# Patient Record
Sex: FEMALE | NOT HISPANIC OR LATINO | Employment: FULL TIME | URBAN - METROPOLITAN AREA
[De-identification: names, ages, dates, MRNs, and addresses within clinical notes are randomized per-mention and may not be internally consistent; named-entity substitution may affect disease eponyms.]

---

## 2014-11-20 LAB
HPV ABSTRACT: NORMAL
PAP-ABSTRACT: ABNORMAL

## 2015-11-25 LAB
HPV ABSTRACT: NORMAL
PAP-ABSTRACT: NORMAL

## 2016-11-28 LAB
ALT SERPL-CCNC: 24 U/L (ref 0–78)
AST SERPL-CCNC: 18 U/L (ref 0–37)
CHOLEST SERPL-MCNC: 179 MG/DL (ref 0–200)
CREAT SERPL-MCNC: 0.7 MG/DL (ref 0.6–1.3)
GLUCOSE SERPL-MCNC: 81 MG/DL (ref 70–99)
HDLC SERPL-MCNC: 70 MG/DL (ref 40–96)
LDLC SERPL CALC-MCNC: 80.4 MG/DL (ref 0–130)
POTASSIUM SERPL-SCNC: 3.8 MMOL/L (ref 3.5–5.1)
TRIGL SERPL-MCNC: 143 MG/DL (ref 30–200)

## 2016-12-05 LAB
HPV ABSTRACT: NORMAL
PAP-ABSTRACT: NORMAL

## 2018-04-06 ENCOUNTER — TRANSFERRED RECORDS (OUTPATIENT)
Dept: HEALTH INFORMATION MANAGEMENT | Facility: CLINIC | Age: 38
End: 2018-04-06

## 2019-03-28 ENCOUNTER — TRANSFERRED RECORDS (OUTPATIENT)
Dept: HEALTH INFORMATION MANAGEMENT | Facility: CLINIC | Age: 39
End: 2019-03-28

## 2019-03-28 LAB
CHOLEST SERPL-MCNC: 158 MG/DL (ref 0–200)
HDLC SERPL-MCNC: 59 MG/DL (ref 40–96)
LDLC SERPL CALC-MCNC: 85.6 MG/DL (ref 0–130)
TRIGL SERPL-MCNC: 67 MG/DL (ref 30–200)

## 2019-04-12 ENCOUNTER — OFFICE VISIT (OUTPATIENT)
Dept: PEDIATRICS | Facility: CLINIC | Age: 39
End: 2019-04-12
Payer: COMMERCIAL

## 2019-04-12 VITALS
SYSTOLIC BLOOD PRESSURE: 140 MMHG | HEIGHT: 63 IN | WEIGHT: 256.6 LBS | TEMPERATURE: 99.1 F | HEART RATE: 84 BPM | BODY MASS INDEX: 45.46 KG/M2 | OXYGEN SATURATION: 97 % | DIASTOLIC BLOOD PRESSURE: 84 MMHG

## 2019-04-12 DIAGNOSIS — E66.01 MORBID OBESITY (H): ICD-10-CM

## 2019-04-12 DIAGNOSIS — F41.9 ANXIETY: ICD-10-CM

## 2019-04-12 DIAGNOSIS — Z11.4 SCREENING FOR HIV (HUMAN IMMUNODEFICIENCY VIRUS): ICD-10-CM

## 2019-04-12 DIAGNOSIS — Z13.1 SCREENING FOR DIABETES MELLITUS: ICD-10-CM

## 2019-04-12 DIAGNOSIS — Z30.41 ENCOUNTER FOR SURVEILLANCE OF CONTRACEPTIVE PILLS: ICD-10-CM

## 2019-04-12 DIAGNOSIS — Z00.00 ROUTINE GENERAL MEDICAL EXAMINATION AT A HEALTH CARE FACILITY: Primary | ICD-10-CM

## 2019-04-12 DIAGNOSIS — I10 BENIGN ESSENTIAL HYPERTENSION: ICD-10-CM

## 2019-04-12 DIAGNOSIS — Z13.220 SCREENING CHOLESTEROL LEVEL: ICD-10-CM

## 2019-04-12 LAB
ALBUMIN SERPL-MCNC: 2.9 G/DL (ref 3.4–5)
ALP SERPL-CCNC: 46 U/L (ref 40–150)
ALT SERPL W P-5'-P-CCNC: 20 U/L (ref 0–50)
ANION GAP SERPL CALCULATED.3IONS-SCNC: 6 MMOL/L (ref 3–14)
AST SERPL W P-5'-P-CCNC: 16 U/L (ref 0–45)
BILIRUB SERPL-MCNC: 0.5 MG/DL (ref 0.2–1.3)
BUN SERPL-MCNC: 12 MG/DL (ref 7–30)
CALCIUM SERPL-MCNC: 8.5 MG/DL (ref 8.5–10.1)
CHLORIDE SERPL-SCNC: 108 MMOL/L (ref 94–109)
CHOLEST SERPL-MCNC: 160 MG/DL
CO2 SERPL-SCNC: 25 MMOL/L (ref 20–32)
CREAT SERPL-MCNC: 0.62 MG/DL (ref 0.52–1.04)
GFR SERPL CREATININE-BSD FRML MDRD: >90 ML/MIN/{1.73_M2}
GLUCOSE SERPL-MCNC: 100 MG/DL (ref 70–99)
HDLC SERPL-MCNC: 69 MG/DL
HIV 1+2 AB+HIV1 P24 AG SERPL QL IA: NONREACTIVE
LDLC SERPL CALC-MCNC: 71 MG/DL
NONHDLC SERPL-MCNC: 98 MG/DL
POTASSIUM SERPL-SCNC: 4.1 MMOL/L (ref 3.4–5.3)
PROT SERPL-MCNC: 6.9 G/DL (ref 6.8–8.8)
SODIUM SERPL-SCNC: 139 MMOL/L (ref 133–144)
TRIGL SERPL-MCNC: 135 MG/DL

## 2019-04-12 PROCEDURE — 87389 HIV-1 AG W/HIV-1&-2 AB AG IA: CPT | Performed by: INTERNAL MEDICINE

## 2019-04-12 PROCEDURE — 99385 PREV VISIT NEW AGE 18-39: CPT | Performed by: INTERNAL MEDICINE

## 2019-04-12 PROCEDURE — 80061 LIPID PANEL: CPT | Performed by: INTERNAL MEDICINE

## 2019-04-12 PROCEDURE — 99213 OFFICE O/P EST LOW 20 MIN: CPT | Mod: 25 | Performed by: INTERNAL MEDICINE

## 2019-04-12 PROCEDURE — 80053 COMPREHEN METABOLIC PANEL: CPT | Performed by: INTERNAL MEDICINE

## 2019-04-12 PROCEDURE — 36415 COLL VENOUS BLD VENIPUNCTURE: CPT | Performed by: INTERNAL MEDICINE

## 2019-04-12 RX ORDER — NORGESTIMATE AND ETHINYL ESTRADIOL 0.25-0.035
KIT ORAL
Refills: 0 | COMMUNITY
Start: 2019-03-05 | End: 2019-04-12

## 2019-04-12 RX ORDER — LISINOPRIL 20 MG/1
20 TABLET ORAL DAILY
Qty: 90 TABLET | Refills: 3 | Status: SHIPPED | OUTPATIENT
Start: 2019-04-12 | End: 2020-04-08

## 2019-04-12 RX ORDER — ESCITALOPRAM OXALATE 10 MG/1
10 TABLET ORAL DAILY
Qty: 90 TABLET | Refills: 3 | Status: SHIPPED | OUTPATIENT
Start: 2019-04-12 | End: 2020-05-28

## 2019-04-12 RX ORDER — NORGESTIMATE AND ETHINYL ESTRADIOL 0.25-0.035
1 KIT ORAL DAILY
Qty: 84 TABLET | Refills: 3 | Status: SHIPPED | OUTPATIENT
Start: 2019-04-12 | End: 2020-03-11

## 2019-04-12 RX ORDER — LISINOPRIL 10 MG/1
10 TABLET ORAL DAILY
Refills: 3 | COMMUNITY
Start: 2018-12-15 | End: 2019-04-12

## 2019-04-12 RX ORDER — ESCITALOPRAM OXALATE 10 MG/1
10 TABLET ORAL DAILY
Refills: 3 | COMMUNITY
Start: 2018-12-15 | End: 2019-04-12

## 2019-04-12 ASSESSMENT — ENCOUNTER SYMPTOMS
ABDOMINAL PAIN: 0
SORE THROAT: 0
SHORTNESS OF BREATH: 0
MYALGIAS: 0
HEADACHES: 0
NERVOUS/ANXIOUS: 0
FREQUENCY: 0
COUGH: 1
CHILLS: 0
DYSURIA: 0
HEMATURIA: 0
FEVER: 0
DIZZINESS: 0
JOINT SWELLING: 0
PALPITATIONS: 0
HEARTBURN: 1
BREAST MASS: 0
WEAKNESS: 0
NAUSEA: 0
ARTHRALGIAS: 0
EYE PAIN: 0
DIARRHEA: 0
PARESTHESIAS: 0
HEMATOCHEZIA: 0
CONSTIPATION: 0

## 2019-04-12 ASSESSMENT — PATIENT HEALTH QUESTIONNAIRE - PHQ9
SUM OF ALL RESPONSES TO PHQ QUESTIONS 1-9: 4
5. POOR APPETITE OR OVEREATING: SEVERAL DAYS

## 2019-04-12 ASSESSMENT — ANXIETY QUESTIONNAIRES
1. FEELING NERVOUS, ANXIOUS, OR ON EDGE: SEVERAL DAYS
3. WORRYING TOO MUCH ABOUT DIFFERENT THINGS: SEVERAL DAYS
5. BEING SO RESTLESS THAT IT IS HARD TO SIT STILL: NOT AT ALL
2. NOT BEING ABLE TO STOP OR CONTROL WORRYING: NOT AT ALL
GAD7 TOTAL SCORE: 3
6. BECOMING EASILY ANNOYED OR IRRITABLE: NOT AT ALL
7. FEELING AFRAID AS IF SOMETHING AWFUL MIGHT HAPPEN: NOT AT ALL

## 2019-04-12 ASSESSMENT — MIFFLIN-ST. JEOR: SCORE: 1810.12

## 2019-04-12 NOTE — PATIENT INSTRUCTIONS
It was nice to see you in clinic.    Please stop at the lab on your way out of clinic. I will be in touch with your results.    Weight  - I'm glad you're thinking about it - I think now is the time!  - Move the healthy stuff to the front. Make it the easy choice. Try to delay the other choice.   - Referral to [facility] to talk about weight loss therapies.     Increase blood pressure dose to 20 mg daily. In a week or two stop by the pharmacy for a blood pressure check (no appointment needed).     Preventive Health Recommendations  Female Ages 26 - 39  Yearly exam:   See your health care provider every year in order to    Review health changes.     Discuss preventive care.      Review your medicines if you your doctor has prescribed any.    Until age 30: Get a Pap test every three years (more often if you have had an abnormal result).    After age 30: Talk to your doctor about whether you should have a Pap test every 3 years or have a Pap test with HPV screening every 5 years.   You do not need a Pap test if your uterus was removed (hysterectomy) and you have not had cancer.  You should be tested each year for STDs (sexually transmitted diseases), if you're at risk.   Talk to your provider about how often to have your cholesterol checked.  If you are at risk for diabetes, you should have a diabetes test (fasting glucose).  Shots: Get a flu shot each year. Get a tetanus shot every 10 years.   Nutrition:     Eat at least 5 servings of fruits and vegetables each day.    Eat whole-grain bread, whole-wheat pasta and brown rice instead of white grains and rice.    Get adequate Calcium and Vitamin D.     Lifestyle    Exercise at least 150 minutes a week (30 minutes a day, 5 days of the week). This will help you control your weight and prevent disease.    Limit alcohol to one drink per day.    No smoking.     Wear sunscreen to prevent skin cancer.    See your dentist every six months for an exam and cleaning.

## 2019-04-12 NOTE — PROGRESS NOTES
rik   SUBJECTIVE:   CC: Dora Barrera is an 38 year old woman who presents for preventive health visit.     Healthy Habits:     Getting at least 3 servings of Calcium per day:  Yes    Bi-annual eye exam:  Yes    Dental care twice a year:  Yes    Sleep apnea or symptoms of sleep apnea:  None    Diet:  Regular (no restrictions)    Frequency of exercise:  None    Taking medications regularly:  Yes    Medication side effects:  None    PHQ-2 Total Score: 0    Additional concerns today:  No    Hypertension Follow-up    Outpatient blood pressures are not being checked.    Low Salt Diet: no added salt  - usually 140 systolic     # Weight  Struggling to lose weight.   Has put 40 lbs on since July.   Stress eater.   Last 4 months has been doing 2 jobs at work.   Has thought about going back to weight watchers.    Anxiety Follow-Up    Status since last visit: No change    Other associated symptoms:heart racing    Complicating factors:   Significant life event: Yes-  Work issues   Current substance abuse: None  Depression symptoms: No  JAQUELINE-7 SCORE 4/12/2019   Total Score 3       JAQUELINE-7    Today's PHQ-2 Score:   PHQ-2 ( 1999 Pf[employer]) 4/12/2019   Q1: Little interest or pleasure in doing things 0   Q2: Feeling down, depressed or hopeless 0   PHQ-2 Score 0   Q1: Little interest or pleasure in doing things Not at all   Q2: Feeling down, depressed or hopeless Not at all   PHQ-2 Score 0       Abuse: Current or Past(Physical, Sexual or Emotional)- No  Do you feel safe in your environment? Yes    Social History     Tobacco Use     Smoking status: Never Smoker     Smokeless tobacco: Never Used   Substance Use Topics     Alcohol use: Yes     Comment: occ     If you drink alcohol do you typically have >3 drinks per day or >7 drinks per week? No    Alcohol Use 4/12/2019   Prescreen: >3 drinks/day or >7 drinks/week? No   Prescreen: >3 drinks/day or >7 drinks/week? -   No flowsheet data found.    Reviewed orders with patient.  Reviewed health  maintenance and updated orders accordingly - Yes  Patient Active Problem List   Diagnosis     Anxiety     BMI > 40.0 (H)     Benign essential hypertension     Past Surgical History:   Procedure Laterality Date     CHOLECYSTECTOMY       HC ASPIRATION OF OVARIAN CYST Left      OOPHORECTOMY Right     Ovarian cyst, torsion       Social History     Tobacco Use     Smoking status: Never Smoker     Smokeless tobacco: Never Used   Substance Use Topics     Alcohol use: Yes     Comment: occ     Family History   Problem Relation Age of Onset     Hypertension Mother      Heart Disease Father      Diabetes Father      Hypertension Father      Colon Cancer No family hx of      Breast Cancer No family hx of          Current Outpatient Medications   Medication Sig Dispense Refill     escitalopram (LEXAPRO) 10 MG tablet Take 1 tablet (10 mg) by mouth daily 90 tablet 3     lisinopril (PRINIVIL/ZESTRIL) 20 MG tablet Take 1 tablet (20 mg) by mouth daily 90 tablet 3     SPRINTEC 28 0.25-35 MG-MCG tablet Take 1 tablet by mouth daily 84 tablet 3     No Known Allergies    Mammogram not appropriate for this patient based on age.    Pertinent mammograms are reviewed under the imaging tab.  History of abnormal Pap smear: Yes several years ago with multiple normals since. Will get records from [facility].      Reviewed and updated as needed this visit by clinical staff      Reviewed and updated as needed this visit by Provider  Med Hx  Surg Hx  Fam Hx        Past Medical History:   Diagnosis Date     Anxiety      Hypertension       Past Surgical History:   Procedure Laterality Date     CHOLECYSTECTOMY       HC ASPIRATION OF OVARIAN CYST Left      OOPHORECTOMY Right     Ovarian cyst, torsion       Review of Systems   Constitutional: Negative for chills and fever.   HENT: Negative for congestion, ear pain, hearing loss and sore throat.    Eyes: Negative for pain and visual disturbance.   Respiratory: Positive for cough. Negative for shortness of  "breath.    Cardiovascular: Negative for chest pain, palpitations and peripheral edema.   Gastrointestinal: Positive for heartburn. Negative for abdominal pain, constipation, diarrhea, hematochezia and nausea.   Breasts:  Negative for tenderness, breast mass and discharge.   Genitourinary: Negative for dysuria, frequency, genital sores, hematuria, pelvic pain, urgency, vaginal bleeding and vaginal discharge.   Musculoskeletal: Negative for arthralgias, joint swelling and myalgias.   Skin: Negative for rash.   Neurological: Negative for dizziness, weakness, headaches and paresthesias.   Psychiatric/Behavioral: Negative for mood changes. The patient is not nervous/anxious.      OBJECTIVE:   /84 (BP Location: Right arm, Patient Position: Sitting, Cuff Size: Adult Large)   Pulse 84   Temp 99.1  F (37.3  C) (Tympanic)   Ht 1.588 m (5' 2.5\")   Wt 116.4 kg (256 lb 9.6 oz)   SpO2 97%   BMI 46.18 kg/m    Physical Exam  GENERAL: healthy, alert and no distress  EYES: Eyes grossly normal to inspection, PERRL and conjunctivae and sclerae normal  HENT: ear canals and TM's normal, nose and mouth without ulcers or lesions  NECK: no adenopathy, no asymmetry, masses, or scars and thyroid normal to palpation  RESP: lungs clear to auscultation - no rales, rhonchi or wheezes  CV: regular rate and rhythm, normal S1 S2, no S3 or S4, no murmur, click or rub, no peripheral edema and peripheral pulses strong  ABDOMEN: soft, nontender, no hepatosplenomegaly, no masses and bowel sounds normal  MS: no gross musculoskeletal defects noted, no edema  SKIN: no suspicious lesions or rashes  NEURO: Normal strength and tone, mentation intact and speech normal  PSYCH: mentation appears normal, affect normal/bright    Diagnostic Test Results:  See results note.      ASSESSMENT/PLAN:   1. Routine general medical examination at a health care facility  2. BMI > 40.0 (H)  Discussed diet/exercise goals. See PI.   Interested in meeting with MTM to " "talk about obesity treatment.  - MED THERAPY MANAGE REFERRAL  - Lipid panel reflex to direct LDL Fasting  - Comprehensive metabolic panel    3. Benign essential hypertension  Elevated BP today - will increase lisinopril and recheck at pharmacy.   - lisinopril (PRINIVIL/ZESTRIL) 20 MG tablet; Take 1 tablet (20 mg) by mouth daily  Dispense: 90 tablet; Refill: 3  - Comprehensive metabolic panel    4. Anxiety  Well controlled.   - escitalopram (LEXAPRO) 10 MG tablet; Take 1 tablet (10 mg) by mouth daily  Dispense: 90 tablet; Refill: 3    5. Encounter for surveillance of contraceptive pills  - SPRINTEC 28 0.25-35 MG-MCG tablet; Take 1 tablet by mouth daily  Dispense: 84 tablet; Refill: 3    6. Screening cholesterol level  - Lipid panel reflex to direct LDL Fasting    7. Screening for diabetes mellitus    8. Screening for HIV (human immunodeficiency virus)  - HIV Screening    COUNSELING:  Reviewed preventive health counseling, as reflected in patient instructions       Regular exercise       Healthy diet/nutrition       Contraception       Safe sex practices/STD prevention       HIV screeninx in teen years, 1x in adult years, and at intervals if high risk    BP Readings from Last 1 Encounters:   19 140/84     Estimated body mass index is 46.18 kg/m  as calculated from the following:    Height as of this encounter: 1.588 m (5' 2.5\").    Weight as of this encounter: 116.4 kg (256 lb 9.6 oz).    Weight management plan: Patient referred to endocrine and/or weight management specialty Discussed healthy diet and exercise guidelines     reports that she has never smoked. She has never used smokeless tobacco.    Counseling Resources:  ATP IV Guidelines  Pooled Cohorts Equation Calculator  Breast Cancer Risk Calculator  FRAX Risk Assessment  ICSI Preventive Guidelines  Dietary Guidelines for Americans,   [employer]'s MyPlate  ASA Prophylaxis  Lung CA Screening    Jeff Contreras MD  [facility] [place]  "

## 2019-04-12 NOTE — LETTER
"               [facility]  [address]  [place] [address]                  [phone]   April 16, 2019    Dora Barrrea  [address]  [place] [address]      Ms. Dora Barrera,     Your standard one-time screening for HIV was negative.     Your electrolytes, kidney function, and liver enzymes were normal.     Your cholesterol looked great!     Your fasting glucose was just minimally elevated - this means you have impaired glucose tolerance, also known as \"pre-diabetes.\" This means you are at a higher risk of developing diabetes. Eating healthily, exercising and maintaining a healthy weight is the best way to help prevent diabetes from developing. We will check your fasting blood sugar/hemoglobin a1c every year to monitor this. I think your plan to meet with our [facility] Pharmacists to talk about weight loss is a great one.     Please let me know if you have any questions,   E. Belen Contreras MD   Internal Medicine-Pediatrics         Results for orders placed or performed in visit on 04/12/19   HIV Screening   Result Value Ref Range    HIV Antigen Antibody Combo Nonreactive NR^Nonreactive       Lipid panel reflex to direct LDL Fasting   Result Value Ref Range    Cholesterol 160 <200 mg/dL    Triglycerides 135 <150 mg/dL    HDL Cholesterol 69 >49 mg/dL    LDL Cholesterol Calculated 71 <100 mg/dL    Non HDL Cholesterol 98 <130 mg/dL   Comprehensive metabolic panel   Result Value Ref Range    Sodium 139 133 - 144 mmol/L    Potassium 4.1 3.4 - 5.3 mmol/L    Chloride 108 94 - 109 mmol/L    Carbon Dioxide 25 20 - 32 mmol/L    Anion Gap 6 3 - 14 mmol/L    Glucose 100 (H) 70 - 99 mg/dL    Urea Nitrogen 12 7 - 30 mg/dL    Creatinine 0.62 0.52 - 1.04 mg/dL    GFR Estimate >90 >60 mL/min/[1.73_m2]    GFR Estimate If Black >90 >60 mL/min/[1.73_m2]    Calcium 8.5 8.5 - 10.1 mg/dL    Bilirubin Total 0.5 0.2 - 1.3 mg/dL    Albumin 2.9 (L) 3.4 - 5.0 g/dL    Protein Total 6.9 6.8 - 8.8 " g/dL    Alkaline Phosphatase 46 40 - 150 U/L    ALT 20 0 - 50 U/L    AST 16 0 - 45 U/L

## 2019-04-13 ASSESSMENT — ANXIETY QUESTIONNAIRES: GAD7 TOTAL SCORE: 3

## 2019-04-16 PROBLEM — R73.01 ELEVATED FASTING GLUCOSE: Status: ACTIVE | Noted: 2019-04-16

## 2019-05-01 ENCOUNTER — ALLIED HEALTH/NURSE VISIT (OUTPATIENT)
Dept: PHARMACY | Facility: CLINIC | Age: 39
End: 2019-05-01
Payer: COMMERCIAL

## 2019-05-01 DIAGNOSIS — Z30.9 ENCOUNTER FOR CONTRACEPTIVE MANAGEMENT, UNSPECIFIED TYPE: ICD-10-CM

## 2019-05-01 DIAGNOSIS — E66.01 MORBID OBESITY (H): Primary | ICD-10-CM

## 2019-05-01 DIAGNOSIS — F41.9 ANXIETY: ICD-10-CM

## 2019-05-01 DIAGNOSIS — I10 BENIGN ESSENTIAL HYPERTENSION: ICD-10-CM

## 2019-05-01 PROCEDURE — 99607 MTMS BY PHARM ADDL 15 MIN: CPT | Performed by: PHARMACIST

## 2019-05-01 PROCEDURE — 99605 MTMS BY PHARM NP 15 MIN: CPT | Performed by: PHARMACIST

## 2019-05-01 NOTE — PROGRESS NOTES
SUBJECTIVE/OBJECTIVE:                           Dora Barrera is a 38 year old female coming in for an initial visit for Medication Therapy Management.  She was referred to me from Dr. Contreras at [facility].    Chief Complaint: Would like to discuss options for weight loss medications    Allergies/ADRs: Reviewed in Epic  Tobacco: No tobacco use  Alcohol: 7-9 beverages / week, has been recently cutting out except for special occasions.   Caffeine: 1 cups/day of coffee, 1 diet soda/day   PMH: Reviewed in Epic    Medication Adherence/Access:  no issues reported    Obesity: Current medication(s) include: none. She is interested in discussing potential options for medications for weight loss. She is nervious about injectable therapies and the coverage for them. She does not have history of kidney stones. She is on birth control, not planning to become pregnant.   Weight History: She feels that she has been overweight all of her life, 180-200 lb for majority of adult life. Had 30-40 lb weight loss, greatest success was losing weight to 155 lb through food tracking and joining gym, used myfitness pal caloric restriction, she doesn't remember what it does. Has used Weight Watchers before, and lost weight but then regained. When she got in relationship she stopped doing these things. She is now the heaviest she has ever been. She feels her sleep is good, getting 8 hours of sleep but still feels tired, she has been told she snores but not excessive.   Diet/Eating Habits: Patient reports she does eat larger portions and still not get full, she craves foods and snacks frequently. Stress eats. Eating salty/sweets after dinner every day: popcorn, a few chocolate chips. Since her last appointment with PCP, she has been changing her eating habits. She was going out to eat more or eating at work cafeteria, not meal prepping. She does not skip meals. Breakfast: fast food or oatmeal; Lunch: variable, Dinner processed  "convenient frozen foods, eating out. She buys healthy snacks but doesn't eat. She finds that she is drinking more alcohol due to stress, but since meeting with Dr. Contreras she has been trying to cut down. She feels like she eats more due to stress.   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 At home, activity has been minimal. She does own a dog, she plans to increase walking for her dog.     Was referred by Dr. Contreras at [facility].   Initial weight: 256 lb 9.6 oz BMI 46.18 kg/m^2 on 4/12/19    Creatinine   Date Value Ref Range Status   04/12/2019 0.62 0.52 - 1.04 mg/dL Final     GFR Estimate   Date Value Ref Range Status   04/12/2019 >90 >60 mL/min/[1.73_m2] Final     Comment:     Non  GFR Calc  Starting 12/18/2018, serum creatinine based estimated GFR (eGFR) will be   calculated using the Chronic Kidney Disease Epidemiology Collaboration   (CKD-EPI) equation.       Potassium   Date Value Ref Range Status   04/12/2019 4.1 3.4 - 5.3 mmol/L Final     CBC from previous primary care clinic was WNL.     Hypertension: Current medications include lisinopril 20 mg once daily, dose was increased on 4/12/19 when she went to doctors office. Patient does not self-monitor BP.  Patient reports no current medication side effects. No dizziness or lightheadedness.     Anxiety: Taking escitalopram 10 mg once daily. She has been on this medication for 3 years. She feels that this medication is effective for anxiety, she thought when she started the medication \"took the anxiety down a notch\". She thought escitalopram had caused her to gain weight, so self decreased to 5 mg daily and noticed increased anxiety so went back up to 10 mg daily.     PHQ-9 SCORE 4/12/2019   PHQ-9 Total Score 4     JAQUELINE-7 SCORE 4/12/2019   Total Score 3     Birth Control : Taking Sprintec 1 tablet once daily. No concerns. She is not planning on becoming " pregnant.     Today's Vitals: There were no vitals taken for this visit. Telemedicine visit.     ASSESSMENT:                             Current medications were reviewed today.     Medication Adherence: good, no issues identified    Obesity: Needs improvement. Patient does have BMI >/= 30 kg/m^2 and does qualify for weight loss medications. She has solely tried diet and exercise and did have success in the past, but due to this difficulty now, could consider starting weight loss medication. Phentermine not appropriate due to last BP. Contrave may not be the most appropriate as she is dealing with a good amount of stress at work and may exacerbate this. Saxenda may be an option in future, but unsure of cost of medication and patient is leery of once daily injectable. Naltrexone could be effective solely, but may not be most appropriate option. Due to issues of consistent thinking of food, stress eating, hunger/cravings - patient may benefit from trial of topiramate taking daily at dinnertime. She is aware that topiramate is not FDA approved for weight loss, but chooses to continue at this time. Aware of risk of birth defects and to continue birth control and/or use contraceptives when sexually active. Discussed food logging and dietary adjustments that may benefit. Also discussed [facility] 24 Week Program, and patient is interested.     Hypertension: last BP not at goal of <140/90 mmHg, but lisinopril was adjusted since that time. Due to have repeat BP. May benefit from repeat BMP in future.     Anxiety: Stable.    Birth Control : Stable.     PLAN:                            1. Will send 24 week program. Will send via email, authorized by patient.     2. Reached out to Dr. Contreras regarding starting topiramate, awaiting to hear her thoughts. She prefers filling prescription at [facility].    Could consider start Topiramate: 25 mg daily at dinner for 1 week, 50 mg daily at dinner for  1 week, then 75 mg daily at dinner thereafter.  Update 5/6/19: Topiramate authorized by Dr. Contreras, Rx sent to pharmacy. Patient called, left VM and CB#.       3. BP will be repeated at follow up. May benefit from repeat BMP due to adjustment of ACE-I.     I spent 45 minutes with this patient today. A copy of the visit note was provided to the patient's referring provider.    Will follow up in 1 month.    The patient was sent via Aniika a summary of these recommendations as an after visit summary.     Lauren Turner Bloch, PharmD  Medication Therapy Management Pharmacist   [facility]   Phone: [phone]

## 2019-05-01 NOTE — Clinical Note
Tony Contreras, sending this to you as FYI. I sent staff message inquiring about starting topiramate. Thank you! Karin Bermudez MTM Pharmacist

## 2019-05-06 RX ORDER — TOPIRAMATE 25 MG/1
TABLET, FILM COATED ORAL
Qty: 90 TABLET | Refills: 3 | Status: SHIPPED | OUTPATIENT
Start: 2019-05-06 | End: 2020-02-12

## 2019-06-26 ENCOUNTER — TELEPHONE (OUTPATIENT)
Dept: PHARMACY | Facility: CLINIC | Age: 39
End: 2019-06-26

## 2019-06-26 NOTE — TELEPHONE ENCOUNTER
Patient has not made follow up appointment for MTM follow up on weight loss medication start. Called patient to schedule follow up with MTM. Patient did not answer. Left VM and CB#.     Lauren Turner Bloch, PharmD  Medication Therapy Management Pharmacist   [facility]   Phone: [phone]

## 2019-11-29 ENCOUNTER — TELEPHONE (OUTPATIENT)
Dept: PHARMACY | Facility: CLINIC | Age: 39
End: 2019-11-29

## 2019-11-29 NOTE — TELEPHONE ENCOUNTER
Called patient today to schedule a follow up with MTM Pharmacist at [facility] as patient lost to follow up. Patient answered, asked for phone # to call back next week. She said her father just recently got out of hospital and could not talk at this time. Gave patient my phone number to call back and phone number to reschedule.     Lauren Bloch, PharmD  Medication Therapy Management Pharmacist   [facility]   Phone: [phone]

## 2019-12-04 ENCOUNTER — TELEPHONE (OUTPATIENT)
Dept: PHARMACY | Facility: CLINIC | Age: 39
End: 2019-12-04

## 2019-12-04 NOTE — TELEPHONE ENCOUNTER
Called patient to check in as last [facility] visit was 5/2019. Patient requested she call back at later date. Called back and patient requesting follow up for assistance with weight loss.     She describes she is feeling really stuck. She feels that things are not clicking and having a hard time feeling motivated. She has life stressors in place and hard to focus on her weight. However, she wants to make her weight a priority.     She states that she did start topiramate and could have been helping with appetite/cravings, but didn't take it consistently. She doesn't know why she didn't but feels like it was stressors with life.     She is interested in 24 week program as previously discussed in May. Patient requested sending information via email so she can check into coverage. Sent information via secure e-mail (below).     Lauren Bloch, Fabio  Medication Therapy Management Pharmacist   [facility]   Phone: [phone]

## 2020-02-11 ENCOUNTER — TRANSFERRED RECORDS (OUTPATIENT)
Dept: HEALTH INFORMATION MANAGEMENT | Facility: CLINIC | Age: 40
End: 2020-02-11

## 2020-02-11 DIAGNOSIS — E66.01 MORBID OBESITY (H): ICD-10-CM

## 2020-02-11 NOTE — TELEPHONE ENCOUNTER
"Requested Prescriptions   Pending Prescriptions Disp Refills     topiramate (TOPAMAX) 25 MG tablet 90 tablet 3     Sig: Take 1 tablet daily with dinner for 1 week, then 2 tablets daily with dinner for 1 week, then 3 tablets daily with dinner.       Anti-Seizure Meds Protocol  Failed - 2/11/2020 12:46 PM        Failed - Review Authorizing provider's last note.      Refer to last progress notes: confirm request is for original authorizing provider (cannot be through other providers).          Failed - Normal CBC on file in past 26 months     No lab results found.              Failed - Normal platelet count on file in past 26 months     No lab results found.            Passed - Recent (12 mo) or future (30 days) visit within the authorizing provider's specialty     Patient has had an office visit with the authorizing provider or a provider within the authorizing providers department within the previous 12 mos or has a future within next 30 days. See \"Patient Info\" tab in inbasket, or \"Choose Columns\" in Meds & Orders section of the refill encounter.              Passed - Normal ALT or AST on file in past 26 months     Recent Labs   Lab Test 04/12/19  0752   ALT 20     Recent Labs   Lab Test 04/12/19  0752   AST 16             Passed - Medication is active on med list        Passed - No active pregnancy on record        Passed - No positive pregnancy test in last 12 months      Routing refill request to provider for review/approval because:  Labs not current:  CBC, platelet  Labs cued.    Abbi Washington, RN   [facility]     "

## 2020-02-12 RX ORDER — TOPIRAMATE 25 MG/1
75 TABLET, FILM COATED ORAL DAILY
Qty: 90 TABLET | Refills: 3 | Status: SHIPPED | OUTPATIENT
Start: 2020-02-12 | End: 2020-05-28

## 2020-02-12 NOTE — TELEPHONE ENCOUNTER
Refill sent.  Patient due for physical with Dr Contreras in April.  Please help her schedule (might want to wait a day or 2 - she was just in the ER with influenza yesterday).      Demi Borrego MD  Internal Medicine - Pediatrics

## 2020-03-10 ENCOUNTER — TELEPHONE (OUTPATIENT)
Dept: PEDIATRICS | Facility: CLINIC | Age: 40
End: 2020-03-10

## 2020-03-10 DIAGNOSIS — Z30.41 ENCOUNTER FOR SURVEILLANCE OF CONTRACEPTIVE PILLS: ICD-10-CM

## 2020-03-11 RX ORDER — NORGESTIMATE AND ETHINYL ESTRADIOL 0.25-0.035
1 KIT ORAL DAILY
Qty: 84 TABLET | Refills: 0 | Status: SHIPPED | OUTPATIENT
Start: 2020-03-11 | End: 2020-05-19

## 2020-03-11 NOTE — TELEPHONE ENCOUNTER
Prescription approved per [facility] Refill Protocol.    TC/MA, please outreach to the patient to set up an annual office visit.

## 2020-03-11 NOTE — TELEPHONE ENCOUNTER
Left message due for annual visit.     Rx sent to pharmacy.     Rika Joseph MA// March 11, 2020 4:34 PM

## 2020-04-06 DIAGNOSIS — I10 BENIGN ESSENTIAL HYPERTENSION: ICD-10-CM

## 2020-04-07 NOTE — TELEPHONE ENCOUNTER
lisinopril (PRINIVIL/ZESTRIL) 20 MG tablet  Routing refill request to provider for review/approval because:  BP out of range  BP Readings from Last 3 Encounters:   04/12/19 140/84     Melissa PACK RN, BSN

## 2020-04-08 RX ORDER — LISINOPRIL 20 MG/1
20 TABLET ORAL DAILY
Qty: 90 TABLET | Refills: 0 | Status: SHIPPED | OUTPATIENT
Start: 2020-04-08 | End: 2020-05-28

## 2020-04-08 NOTE — TELEPHONE ENCOUNTER
Due for labs and BP check.     Will refill until next OV.     RACHEL Contreras MD  Internal Medicine-Pediatrics

## 2020-05-13 DIAGNOSIS — Z30.41 ENCOUNTER FOR SURVEILLANCE OF CONTRACEPTIVE PILLS: ICD-10-CM

## 2020-05-13 RX ORDER — NORGESTIMATE AND ETHINYL ESTRADIOL 0.25-0.035
1 KIT ORAL DAILY
Qty: 84 TABLET | Refills: 0 | OUTPATIENT
Start: 2020-05-13

## 2020-05-19 RX ORDER — NORGESTIMATE AND ETHINYL ESTRADIOL 0.25-0.035
1 KIT ORAL DAILY
Qty: 84 TABLET | Refills: 0 | Status: SHIPPED | OUTPATIENT
Start: 2020-05-19 | End: 2020-05-28

## 2020-05-19 NOTE — TELEPHONE ENCOUNTER
Routing refill request to provider for review/approval because:  Patient would like to take medication continually.     Bibi Matthews RN on 5/19/2020 at 9:47 AM

## 2020-05-19 NOTE — TELEPHONE ENCOUNTER
Patient takes continuously (doesn't take non-medicated pills). Needs refill ASAP.  -Galilea Dent

## 2020-05-28 ENCOUNTER — VIRTUAL VISIT (OUTPATIENT)
Dept: PEDIATRICS | Facility: CLINIC | Age: 40
End: 2020-05-28
Payer: COMMERCIAL

## 2020-05-28 DIAGNOSIS — Z13.1 SCREENING FOR DIABETES MELLITUS: ICD-10-CM

## 2020-05-28 DIAGNOSIS — F41.9 ANXIETY: ICD-10-CM

## 2020-05-28 DIAGNOSIS — E66.01 MORBID OBESITY (H): Primary | ICD-10-CM

## 2020-05-28 DIAGNOSIS — I10 BENIGN ESSENTIAL HYPERTENSION: ICD-10-CM

## 2020-05-28 DIAGNOSIS — Z30.41 ENCOUNTER FOR SURVEILLANCE OF CONTRACEPTIVE PILLS: ICD-10-CM

## 2020-05-28 PROCEDURE — 99214 OFFICE O/P EST MOD 30 MIN: CPT | Mod: TEL | Performed by: INTERNAL MEDICINE

## 2020-05-28 PROCEDURE — 96127 BRIEF EMOTIONAL/BEHAV ASSMT: CPT | Performed by: INTERNAL MEDICINE

## 2020-05-28 RX ORDER — TOPIRAMATE 25 MG/1
TABLET, FILM COATED ORAL
Qty: 270 TABLET | Refills: 1 | Status: SHIPPED | OUTPATIENT
Start: 2020-05-28 | End: 2020-09-10

## 2020-05-28 RX ORDER — ESCITALOPRAM OXALATE 10 MG/1
10 TABLET ORAL DAILY
Qty: 90 TABLET | Refills: 1 | Status: SHIPPED | OUTPATIENT
Start: 2020-05-28 | End: 2020-08-06

## 2020-05-28 RX ORDER — NORGESTIMATE AND ETHINYL ESTRADIOL 0.25-0.035
1 KIT ORAL DAILY
Qty: 84 TABLET | Refills: 1 | Status: SHIPPED | OUTPATIENT
Start: 2020-05-28 | End: 2020-09-25

## 2020-05-28 RX ORDER — LISINOPRIL 20 MG/1
20 TABLET ORAL DAILY
Qty: 90 TABLET | Refills: 0 | Status: SHIPPED | OUTPATIENT
Start: 2020-05-28 | End: 2020-08-06

## 2020-05-28 ASSESSMENT — ANXIETY QUESTIONNAIRES
GAD7 TOTAL SCORE: 3
2. NOT BEING ABLE TO STOP OR CONTROL WORRYING: SEVERAL DAYS
3. WORRYING TOO MUCH ABOUT DIFFERENT THINGS: SEVERAL DAYS
IF YOU CHECKED OFF ANY PROBLEMS ON THIS QUESTIONNAIRE, HOW DIFFICULT HAVE THESE PROBLEMS MADE IT FOR YOU TO DO YOUR WORK, TAKE CARE OF THINGS AT HOME, OR GET ALONG WITH OTHER PEOPLE: SOMEWHAT DIFFICULT
7. FEELING AFRAID AS IF SOMETHING AWFUL MIGHT HAPPEN: SEVERAL DAYS
1. FEELING NERVOUS, ANXIOUS, OR ON EDGE: NOT AT ALL
6. BECOMING EASILY ANNOYED OR IRRITABLE: NOT AT ALL
5. BEING SO RESTLESS THAT IT IS HARD TO SIT STILL: NOT AT ALL

## 2020-05-28 ASSESSMENT — PATIENT HEALTH QUESTIONNAIRE - PHQ9
SUM OF ALL RESPONSES TO PHQ QUESTIONS 1-9: 2
5. POOR APPETITE OR OVEREATING: NOT AT ALL

## 2020-05-28 NOTE — Clinical Note
Tony Frazier!     We miss you! Dora had such nice things to say about you. She is still interested in working with you but had things come up here or there which made it hard. She's going to restart the topamax and then would like to connect with you in 6-8 weeks - does that work? Can you reach out to her to schedule?    Thanks! Belen

## 2020-05-28 NOTE — PATIENT INSTRUCTIONS
Tony John,     It was great to talk to this morning-I am so sorry to hear about your mom but I am grateful that you are there to help your parents.    I refilled your medications so you should be good until I see you in September.     My team will call you to set up a nonfasting lab appointment and a BP check in the next month or so. We can cancel your September lab appointment.     I'll message Karin and see if she can connect with you in 6-8 weeks or so.    If you end up needing a note for work or FMLA please let me know.     Take care!  RACHEL Contreras MD  Internal Medicine-Pediatrics

## 2020-05-28 NOTE — PROGRESS NOTES
"Dora Barrera is a 40 year old female who is being evaluated via a billable telephone visit.      The patient has been notified of following:     \"This telephone visit will be conducted via a call between you and your physician/provider. We have found that certain health care needs can be provided without the need for a physical exam.  This service lets us provide the care you need with a short phone conversation.  If a prescription is necessary we can send it directly to your pharmacy.  If lab work is needed we can place an order for that and you can then stop by our lab to have the test done at a later time.    Telephone visits are billed at different rates depending on your insurance coverage. During this emergency period, for some insurers they may be billed the same as an in-person visit.  Please reach out to your insurance provider with any questions.    If during the course of the call the physician/provider feels a telephone visit is not appropriate, you will not be charged for this service.\"    Patient has given verbal consent for Telephone visit?  Yes    What phone number would you like to be contacted at? [phone]     How would you like to obtain your AVS? Yamileth Montes   Dora Barrera is a 40 year old female who presents via phone visit today for the following health issues:    HPI  Hypertension Follow-up    Do you check your blood pressure regularly outside of the clinic? No     Are you following a low salt diet? No    Depression and Anxiety Follow-Up    How are you doing with your depression since your last visit? stable    How are you doing with your anxiety since your last visit?  stable    Are you having other symptoms that might be associated with depression or anxiety? No    Have you had a significant life event? OTHER: mother dx with stage 4 cancer     Do you have any concerns with your use of alcohol or other drugs? No    Social History     Tobacco Use     Smoking status: Never " Smoker     Smokeless tobacco: Never Used   Substance Use Topics     Alcohol use: Yes     Comment: occ     Drug use: Never     PHQ 4/12/2019 5/28/2020   PHQ-9 Total Score 4 2   Q9: Thoughts of better off dead/self-harm past 2 weeks Not at all Not at all     JAQUELINE-7 SCORE 4/12/2019 5/28/2020   Total Score 3 3     Last PHQ-9 5/28/2020   1.  Little interest or pleasure in doing things 0   2.  Feeling down, depressed, or hopeless 1   3.  Trouble falling or staying asleep, or sleeping too much 0   4.  Feeling tired or having little energy 0   5.  Poor appetite or overeating 1   6.  Feeling bad about yourself 0   7.  Trouble concentrating 0   8.  Moving slowly or restless 0   Q9: Thoughts of better off dead/self-harm past 2 weeks 0   PHQ-9 Total Score 2   Difficulty at work, home, or with people Somewhat difficult     JAQUELINE-7  5/28/2020   1. Feeling nervous, anxious, or on edge 0   2. Not being able to stop or control worrying 1   3. Worrying too much about different things 1   4. Trouble relaxing 0   5. Being so restless that it is hard to sit still 0   6. Becoming easily annoyed or irritable 0   7. Feeling afraid, as if something awful might happen 1   JAQUELINE-7 Total Score 3   If you checked any problems, how difficult have they made it for you to do your work, take care of things at home, or get along with other people? Somewhat difficult     Suicide Assessment Five-step Evaluation and Treatment (SAFE-T)    Medication Followup of Topiramate 75 mg (for weight loss    Taking Medication as prescribed: yes    Side Effects:  None    Medication Helping Symptoms:  Unsure if she took it consistently or in the right way      Medication Followup of OCP    Taking Medication as prescribed: yes    Side Effects:  None    Medication Helping Symptoms:  yes     # Weight  - hasn't been able to make the progress she hoped  - weight is stable  - covid and now mom's cancer making things hard  - hasn't been taking topamax too regularly -  "disorganized and can't find the last refill  - [employer] got [employer] and program - get a scale, weight in,  through [facility]  - noticed a slight change with topamax, not \"gorging as much\" - it has helped her dad  - being around her parents she wants to do something - dad vocalizes his concerns about her weight - she's more conscious     # Social  - still working for [employer]  - has been with Mom for past 3 weeks taking her   - going to phase people back in stages - putting her in the do not phase in    Patient Active Problem List   Diagnosis     Anxiety     BMI > 40.0 (H)     Benign essential hypertension     Elevated fasting glucose     Past Surgical History:   Procedure Laterality Date     CHOLECYSTECTOMY       HC ASPIRATION OF OVARIAN CYST Left      OOPHORECTOMY Right     Ovarian cyst, torsion       Social History     Tobacco Use     Smoking status: Never Smoker     Smokeless tobacco: Never Used   Substance Use Topics     Alcohol use: Yes     Comment: occ     Family History   Problem Relation Age of Onset     Hypertension Mother      Cancer Mother 72        dx with kidney cancer - stage 4     Heart Disease Father      Diabetes Father      Hypertension Father      Colon Cancer No family hx of      Breast Cancer No family hx of          Current Outpatient Medications   Medication Sig Dispense Refill     escitalopram (LEXAPRO) 10 MG tablet Take 1 tablet (10 mg) by mouth daily 90 tablet 1     lisinopril (ZESTRIL) 20 MG tablet Take 1 tablet (20 mg) by mouth daily 90 tablet 0     norgestimate-ethinyl estradiol (SPRINTEC 28) 0.25-35 MG-MCG tablet Take 1 tablet by mouth daily 84 tablet 1     topiramate (TOPAMAX) 25 MG tablet Take 25 mg with dinner for one week, then increase to 50 mg for one week, then increase to 75 mg. 270 tablet 1     No Known Allergies    Reviewed and updated as needed this visit by Provider         Review of Systems   Constitutional, HEENT, cardiovascular, pulmonary, gi and gu systems are negative, except " as otherwise noted.       Objective   Reported vitals:  There were no vitals taken for this visit.   healthy, alert and no distress  PSYCH: Alert and oriented times 3; coherent speech, normal   rate and volume, able to articulate logical thoughts, able   to abstract reason, no tangential thoughts, no hallucinations   or delusions  Her affect is normal  RESP: No cough, no audible wheezing, able to talk in full sentences  Remainder of exam unable to be completed due to telephone visits    Diagnostic Test Results:  none         Assessment/Plan:    ICD-10-CM    1. BMI > 40.0 (H)  E66.01 Hemoglobin A1c     **Comprehensive metabolic panel FUTURE anytime     topiramate (TOPAMAX) 25 MG tablet   2. Encounter for surveillance of contraceptive pills  Z30.41 norgestimate-ethinyl estradiol (SPRINTEC 28) 0.25-35 MG-MCG tablet   3. Anxiety  F41.9 escitalopram (LEXAPRO) 10 MG tablet   4. Benign essential hypertension  I10 **Comprehensive metabolic panel FUTURE anytime     lisinopril (ZESTRIL) 20 MG tablet     Albumin Random Urine Quantitative with Creat Ratio   5. Screening for diabetes mellitus  Z13.1 Hemoglobin A1c     --------------------------  PATIENT INSTRUCTIONS    Patient Instructions   Hi Dora,     It was great to talk to this morning-I am so sorry to hear about your mom but I am grateful that you are there to help your parents.    I refilled your medications so you should be good until I see you in September.     My team will call you to set up a nonfasting lab appointment and a BP check in the next month or so. We can cancel your September lab appointment.     I'll message Karin and see if she can connect with you in 6-8 weeks or so.    If you end up needing a note for work or FMLA please let me know.     Take care!  RACHEL Contreras MD  Internal Medicine-Pediatrics      ------------------------    Return in about 4 months (around 9/28/2020) for as scheduled.    Phone call duration:  16 minutes  [phone]    Jeff  Belen Contreras MD

## 2020-05-29 ASSESSMENT — ANXIETY QUESTIONNAIRES: GAD7 TOTAL SCORE: 3

## 2020-06-08 ENCOUNTER — TELEPHONE (OUTPATIENT)
Dept: PHARMACY | Facility: CLINIC | Age: 40
End: 2020-06-08

## 2020-06-08 NOTE — TELEPHONE ENCOUNTER
Called patient to schedule follow up at [facility] with [facility] Pharmacist. No answer. Left VM and CB# for scheduling.     Lauren Bloch, PharmD  Medication Therapy Management Pharmacist   [facility]   Phone: [phone]

## 2020-06-15 NOTE — TELEPHONE ENCOUNTER
Called patient to schedule follow up visit with [facility] pharmacist. Patient reports that mother's care has transitioned to end of life care, so will not be scheduling follow up soon. Reports that she will schedule 8 weeks out. Provided # to schedule at patient's request. Explained to her no rush, my thoughts and prayers with family during this time.     Lauren Bloch, PharmD  Medication Therapy Management Pharmacist   [facility]   Phone: [phone]

## 2020-06-24 ENCOUNTER — ALLIED HEALTH/NURSE VISIT (OUTPATIENT)
Dept: NURSING | Facility: CLINIC | Age: 40
End: 2020-06-24
Payer: COMMERCIAL

## 2020-06-24 VITALS
SYSTOLIC BLOOD PRESSURE: 116 MMHG | WEIGHT: 256 LBS | DIASTOLIC BLOOD PRESSURE: 86 MMHG | BODY MASS INDEX: 46.08 KG/M2 | HEART RATE: 72 BPM | OXYGEN SATURATION: 98 %

## 2020-06-24 DIAGNOSIS — I10 BENIGN ESSENTIAL HYPERTENSION: Primary | ICD-10-CM

## 2020-06-24 PROCEDURE — 99207 ZZC NO CHARGE NURSE ONLY: CPT

## 2020-06-24 NOTE — PROGRESS NOTES
Dora Barrera is a 40 year old patient who comes in today for a Blood Pressure check.  Initial BP:  /86 (BP Location: Right arm, Patient Position: Sitting, Cuff Size: Adult Large)   Pulse 72   Wt 116.1 kg (256 lb)   SpO2 98%   BMI 46.08 kg/m       72  Disposition: results routed to provider    Patient would like to inform PCP that her mother was recently placed in Hospice care and she is having increased Anxiety. Patient would like to know if she could have her Lexapro increased. Patient also has concerns about [possible sleep apnea.         Rahel Delacruz, CMA

## 2020-06-24 NOTE — PROGRESS NOTES
Informed patient of message below from provider. Patient is scheduled for 6 week video visit for medication follow up with provider. Patient will decided if she wants to do e-visit or wait, not sure at this point.     Missy Elizondo MA

## 2020-06-24 NOTE — PROGRESS NOTES
I'm so sorry to hear about her mother.     Okay to increase lexapro to 20 mg daily. Please schedule VV with me in 6 weeks to assess how she is doing with that change. We can either talk about sleep apnea then or she can send me an evisit telling me her symptoms, etc. That way I can figure out if doing a sleep study makes sense.     RACHEL Contreras MD  Internal Medicine-Pediatrics

## 2020-06-25 DIAGNOSIS — I10 BENIGN ESSENTIAL HYPERTENSION: ICD-10-CM

## 2020-06-25 DIAGNOSIS — E66.01 MORBID OBESITY (H): ICD-10-CM

## 2020-06-25 DIAGNOSIS — Z13.1 SCREENING FOR DIABETES MELLITUS: ICD-10-CM

## 2020-06-25 LAB
ALBUMIN SERPL-MCNC: 2.8 G/DL (ref 3.4–5)
ALP SERPL-CCNC: 47 U/L (ref 40–150)
ALT SERPL W P-5'-P-CCNC: 43 U/L (ref 0–50)
ANION GAP SERPL CALCULATED.3IONS-SCNC: 7 MMOL/L (ref 3–14)
AST SERPL W P-5'-P-CCNC: 27 U/L (ref 0–45)
BILIRUB SERPL-MCNC: 0.4 MG/DL (ref 0.2–1.3)
BUN SERPL-MCNC: 12 MG/DL (ref 7–30)
CALCIUM SERPL-MCNC: 8.1 MG/DL (ref 8.5–10.1)
CHLORIDE SERPL-SCNC: 112 MMOL/L (ref 94–109)
CO2 SERPL-SCNC: 21 MMOL/L (ref 20–32)
CREAT SERPL-MCNC: 0.69 MG/DL (ref 0.52–1.04)
GFR SERPL CREATININE-BSD FRML MDRD: >90 ML/MIN/{1.73_M2}
GLUCOSE SERPL-MCNC: 87 MG/DL (ref 70–99)
HBA1C MFR BLD: 5.4 % (ref 0–5.6)
POTASSIUM SERPL-SCNC: 3.7 MMOL/L (ref 3.4–5.3)
PROT SERPL-MCNC: 7.1 G/DL (ref 6.8–8.8)
SODIUM SERPL-SCNC: 140 MMOL/L (ref 133–144)

## 2020-06-25 PROCEDURE — 83036 HEMOGLOBIN GLYCOSYLATED A1C: CPT | Performed by: INTERNAL MEDICINE

## 2020-06-25 PROCEDURE — 80053 COMPREHEN METABOLIC PANEL: CPT | Performed by: INTERNAL MEDICINE

## 2020-06-25 PROCEDURE — 36415 COLL VENOUS BLD VENIPUNCTURE: CPT | Performed by: INTERNAL MEDICINE

## 2020-06-25 PROCEDURE — 82043 UR ALBUMIN QUANTITATIVE: CPT | Performed by: INTERNAL MEDICINE

## 2020-06-25 NOTE — LETTER
"June 29, 2020      Dora Barrera  [address] [place] [address]        Hi Ms. Barrera,     I was really sorry to hear about your Mom - I am thinking of you.     Your blood pressure looks great. Enclosed are your lab results.     Your hemoglobin a1c (screens for diabetes) was normal.     Your urine tests were normal.     Your kidneys and liver look great. Your albumin (a type of protein) was a little low again. Typically I look at your urine (which we already did) and your stool. I put in a few extra tests. If these are normal I think we can watch this unless you develop other symptoms. Please make a \"lab only\" appointment in the next month or so.     Please let me know if you have any questions,   E. Belen Contreras MD   Internal Medicine-Pediatrics     Resulted Orders   Albumin Random Urine Quantitative with Creat Ratio   Result Value Ref Range    Creatinine Urine 210 mg/dL    Albumin Urine mg/L 10 mg/L    Albumin Urine mg/g Cr 4.76 0 - 25 mg/g Cr   **Comprehensive metabolic panel FUTURE anytime   Result Value Ref Range    Sodium 140 133 - 144 mmol/L    Potassium 3.7 3.4 - 5.3 mmol/L    Chloride 112 (H) 94 - 109 mmol/L    Carbon Dioxide 21 20 - 32 mmol/L    Anion Gap 7 3 - 14 mmol/L    Glucose 87 70 - 99 mg/dL    Urea Nitrogen 12 7 - 30 mg/dL    Creatinine 0.69 0.52 - 1.04 mg/dL    GFR Estimate >90 >60 mL/min/[1.73_m2]      Comment:      Non  GFR Calc  Starting 12/18/2018, serum creatinine based estimated GFR (eGFR) will be   calculated using the Chronic Kidney Disease Epidemiology Collaboration   (CKD-EPI) equation.      GFR Estimate If Black >90 >60 mL/min/[1.73_m2]      Comment:       GFR Calc  Starting 12/18/2018, serum creatinine based estimated GFR (eGFR) will be   calculated using the Chronic Kidney Disease Epidemiology Collaboration   (CKD-EPI) equation.      Calcium 8.1 (L) 8.5 - 10.1 mg/dL    Bilirubin Total 0.4 0.2 - 1.3 mg/dL    Albumin 2.8 (L) 3.4 - " 5.0 g/dL    Protein Total 7.1 6.8 - 8.8 g/dL    Alkaline Phosphatase 47 40 - 150 U/L    ALT 43 0 - 50 U/L    AST 27 0 - 45 U/L   Hemoglobin A1c   Result Value Ref Range    Hemoglobin A1C 5.4 0 - 5.6 %      Comment:      Normal <5.7% Prediabetes 5.7-6.4%  Diabetes 6.5% or higher - adopted from ADA   consensus guidelines.

## 2020-06-26 LAB
CREAT UR-MCNC: 210 MG/DL
MICROALBUMIN UR-MCNC: 10 MG/L
MICROALBUMIN/CREAT UR: 4.76 MG/G CR (ref 0–25)

## 2020-06-29 DIAGNOSIS — E88.09 HYPOALBUMINEMIA: Primary | ICD-10-CM

## 2020-08-03 DIAGNOSIS — E88.09 HYPOALBUMINEMIA: ICD-10-CM

## 2020-08-03 LAB — ERYTHROCYTE [SEDIMENTATION RATE] IN BLOOD BY WESTERGREN METHOD: 23 MM/H (ref 0–20)

## 2020-08-03 PROCEDURE — 85610 PROTHROMBIN TIME: CPT | Performed by: INTERNAL MEDICINE

## 2020-08-03 PROCEDURE — 86140 C-REACTIVE PROTEIN: CPT | Performed by: INTERNAL MEDICINE

## 2020-08-03 PROCEDURE — 36415 COLL VENOUS BLD VENIPUNCTURE: CPT | Performed by: INTERNAL MEDICINE

## 2020-08-03 PROCEDURE — 85652 RBC SED RATE AUTOMATED: CPT | Performed by: INTERNAL MEDICINE

## 2020-08-04 LAB
CRP SERPL-MCNC: 40 MG/L (ref 0–8)
INR PPP: 1 (ref 0.86–1.14)

## 2020-08-05 ENCOUNTER — NURSE TRIAGE (OUTPATIENT)
Dept: NURSING | Facility: CLINIC | Age: 40
End: 2020-08-05

## 2020-08-05 DIAGNOSIS — E88.09 HYPOALBUMINEMIA: ICD-10-CM

## 2020-08-05 PROCEDURE — 99000 SPECIMEN HANDLING OFFICE-LAB: CPT | Performed by: INTERNAL MEDICINE

## 2020-08-05 PROCEDURE — 82103 ALPHA-1-ANTITRYPSIN TOTAL: CPT | Mod: 90 | Performed by: INTERNAL MEDICINE

## 2020-08-05 NOTE — TELEPHONE ENCOUNTER
Caller has questions about stool sample tht she has collected; unsure if she is to put itin the refrigerator   Advised to [facility]   understand and will comply   Karma Cooper RN  FNA

## 2020-08-06 ENCOUNTER — TELEPHONE (OUTPATIENT)
Dept: PEDIATRICS | Facility: CLINIC | Age: 40
End: 2020-08-06

## 2020-08-06 ENCOUNTER — VIRTUAL VISIT (OUTPATIENT)
Dept: PEDIATRICS | Facility: CLINIC | Age: 40
End: 2020-08-06
Payer: COMMERCIAL

## 2020-08-06 DIAGNOSIS — F41.9 ANXIETY: Primary | ICD-10-CM

## 2020-08-06 DIAGNOSIS — F43.21 GRIEF: ICD-10-CM

## 2020-08-06 DIAGNOSIS — N64.4 BREAST PAIN: ICD-10-CM

## 2020-08-06 DIAGNOSIS — E88.09 HYPOALBUMINEMIA: ICD-10-CM

## 2020-08-06 DIAGNOSIS — I10 BENIGN ESSENTIAL HYPERTENSION: ICD-10-CM

## 2020-08-06 DIAGNOSIS — R40.0 DAYTIME SLEEPINESS: ICD-10-CM

## 2020-08-06 DIAGNOSIS — E66.01 MORBID OBESITY (H): ICD-10-CM

## 2020-08-06 PROCEDURE — 99214 OFFICE O/P EST MOD 30 MIN: CPT | Mod: 95 | Performed by: INTERNAL MEDICINE

## 2020-08-06 RX ORDER — ESCITALOPRAM OXALATE 20 MG/1
20 TABLET ORAL DAILY
Qty: 90 TABLET | Refills: 0 | Status: SHIPPED | OUTPATIENT
Start: 2020-08-06 | End: 2020-09-25

## 2020-08-06 RX ORDER — ESCITALOPRAM OXALATE 20 MG/1
10 TABLET ORAL DAILY
Qty: 90 TABLET | Refills: 0 | Status: SHIPPED | OUTPATIENT
Start: 2020-08-06 | End: 2020-08-06

## 2020-08-06 RX ORDER — LISINOPRIL 20 MG/1
20 TABLET ORAL DAILY
Qty: 90 TABLET | Refills: 0 | Status: SHIPPED | OUTPATIENT
Start: 2020-08-06 | End: 2020-09-25

## 2020-08-06 NOTE — TELEPHONE ENCOUNTER
Called [facility], they stated that a diagnostic mammogram would be preferred.     Bibi Matthews RN on 8/6/2020 at 1:16 PM

## 2020-08-06 NOTE — TELEPHONE ENCOUNTER
Thank you - please assist in getting pt schedule for diag mammo/ultrasound and then seeing Surgery a few days later.     Please remind her if pain worsening, redness spreading, fevers, discharge -> call and we'd consider an infection.    RACHEL Contreras MD  Internal Medicine-Pediatrics

## 2020-08-06 NOTE — TELEPHONE ENCOUNTER
Please call [facility]. I saw a pt today virtually who I think has an epidermoid cyst on her breast in the spot of a very distance biopsy (10 years + ago).     I was planning on doing an ultrasound and them having her see them. Would they want a diagnostic mammogram as well?    It swelled up a few weeks ago, got a little red, and then went away. It just came back a few days ago - a little tender, red, but not hot/no fevers/drainage/etc.     Please route back to me and I can place orders/referral.     RACHEL Contreras MD  Internal Medicine-Pediatrics

## 2020-08-06 NOTE — PROGRESS NOTES
"Dora Barrera is a 40 year old female who is being evaluated via a billable video visit.      The patient has been notified of following:     \"This video visit will be conducted via a call between you and your physician/provider. We have found that certain health care needs can be provided without the need for an in-person physical exam.  This service lets us provide the care you need with a video conversation.  If a prescription is necessary we can send it directly to your pharmacy.  If lab work is needed we can place an order for that and you can then stop by our lab to have the test done at a later time.    Video visits are billed at different rates depending on your insurance coverage.  Please reach out to your insurance provider with any questions.    If during the course of the call the physician/provider feels a video visit is not appropriate, you will not be charged for this service.\"    Patient has given verbal consent for Video visit? Yes Jeff Contreras MD on 8/6/2020 at 8:54 AM    How would you like to obtain your AVS? MyChart  If you are dropped from the video visit, the video invite should be resent to: Text to cell phone: [phone]     Will anyone else be joining your video visit? No      Subjective     Dora Barrera is a 40 year old female who presents today via video visit for the following health issues:    History of Present Illness        She eats 2-3 servings of fruits and vegetables daily.She consumes 1 sweetened beverage(s) daily.She exercises with enough effort to increase her heart rate 9 or less minutes per day.  She exercises with enough effort to increase her heart rate 3 or less days per week. She is missing 3 dose(s) of medications per week.  She is not taking prescribed medications regularly due to remembering to take.    Medication Followup of Lexapro, Topamax    Taking Medication as prescribed: yes    Side Effects:  None    Medication Helping Symptoms:  Yes to " "Lexapro, Topamax - not helping sx.     ** Pt has additional concerns today with sleep apnea, parent of hers is concerned with getting sleep study to ensure pt does not have sleep apnea - yawns a lot. Pt also reports she had R breast biopsied years (15 edmar) ago, area is now turning bright red she says, gotten really sensitive, swollen. Did not use any OTC meds on affected area, went away on its own. Comes and goes. Last 1-2 months has noticed this.    --------------------    #MH  PHQ 4/12/2019 5/28/2020   PHQ-9 Total Score 4 2   Q9: Thoughts of better off dead/self-harm past 2 weeks Not at all Not at all     JAQUELINE-7 SCORE 4/12/2019 5/28/2020   Total Score 3 3     - grieving but overall doing okay  - counseling has been part of her journey at different times. Feels like she needs to revisit that.   - hospice has offered grief counseling - up a year afterwards  - has the info to connect with     # Weight  - doesn't notice a difference at all with topamax  - now on 75 mg daily --> maybe feels more   - doesn't think she's gaining - work clothes still fit    # Social   - going back to school - was on pause May to August    # HTN  BP Readings from Last 6 Encounters:   06/24/20 116/86   04/12/19 140/84     # Elevated CRP  - no joint pains, swelling  - no autoimmune disease in family  - occasionally belly aches and loose stools -> no blood  - takes ibuprofen 1x a week  - takes excedrin during period for headaches    # Breast  - red sore around where a biopsy was done about 10-15 years ago - for fibroadenoma  - in the last month it get red and inflamed around it - does hurt  - not cyclical with period   - redness not spreading  - no discharge    # Sleep  - stayed with parents for 2 months and with dad  - Dad noticed she \"yawned\" a lot   - Dad wonders if she has sleep apnea  - no snoring (but not sure if anyone would have noticed)  - does wake up feeling pretty tired  - no morning headaches    Video Start Time: 9:30 AM    Mom " passed away a month ago.   Was on hospice, quick but peaceful.     Patient Active Problem List   Diagnosis     Anxiety     BMI > 40.0 (H)     Benign essential hypertension     Elevated fasting glucose     Past Surgical History:   Procedure Laterality Date     CHOLECYSTECTOMY       HC ASPIRATION OF OVARIAN CYST Left      OOPHORECTOMY Right     Ovarian cyst, torsion       Social History     Tobacco Use     Smoking status: Never Smoker     Smokeless tobacco: Never Used   Substance Use Topics     Alcohol use: Yes     Comment: occ     Family History   Problem Relation Age of Onset     Hypertension Mother      Cancer Mother 72        dx with kidney cancer - stage 4     Heart Disease Father      Diabetes Father      Hypertension Father      Colon Cancer No family hx of      Breast Cancer No family hx of          Current Outpatient Medications   Medication Sig Dispense Refill     escitalopram (LEXAPRO) 20 MG tablet Take 1 tablet (20 mg) by mouth daily 90 tablet 0     lisinopril (ZESTRIL) 20 MG tablet Take 1 tablet (20 mg) by mouth daily 90 tablet 0     norgestimate-ethinyl estradiol (SPRINTEC 28) 0.25-35 MG-MCG tablet Take 1 tablet by mouth daily 84 tablet 1     topiramate (TOPAMAX) 25 MG tablet Take 25 mg with dinner for one week, then increase to 50 mg for one week, then increase to 75 mg. 270 tablet 1     No Known Allergies    Reviewed and updated as needed this visit by Provider         Review of Systems   Constitutional, HEENT, cardiovascular, pulmonary, gi and gu systems are negative, except as otherwise noted.      Objective             Physical Exam     GENERAL: Healthy, alert and no distress  EYES: Eyes grossly normal to inspection.  No discharge or erythema, or obvious scleral/conjunctival abnormalities.  RESP: No audible wheeze, cough, or visible cyanosis.  No visible retractions or increased work of breathing.    SKIN: Right breast with half dollar size of erythema with central cyst appearing lesion with darker  head, mildly tender to patient palpation, not warm according to patient, no discharge  NEURO: Cranial nerves grossly intact.  Mentation and speech appropriate for age.  PSYCH: Mentation appears normal, affect normal/bright, judgement and insight intact, normal speech and appearance well-groomed.      Diagnostic Test Results:  Labs reviewed in Epic        Assessment & Plan     1. Anxiety  Overall doing well - feels like meds are in a good place.   - escitalopram (LEXAPRO) 20 MG tablet; Take 1 tablets (20 mg) by mouth daily  Dispense: 90 tablet; Refill: 0    2. Grief  Working through Mom's death - will connect with hospice support.     3. Breast pain  Looks like epidermoid cyst on exam. She will watch for signs of infection/cellulitis. Will work to get diag mammo/us (?this week) and then see Surgery. If imaging suggests something else can cancel surgery appt.   - MA Diagnostic Digital Bilateral; Future  - US Breast Right Limited 1-3 Quadrants; Future  - GENERAL SURG ADULT REFERRAL; Future    4. Hypoalbuminemia  Elevated inflammatory markers - no joint pain, autoimmune history, etc. Will await stool a1at and then make f/u plan.     5. Daytime sleepiness  Possible AUGIE- will refer for sleep study.  - SLEEP EVALUATION & MANAGEMENT REFERRAL - ADULT -[facility] [place]  [phone] (Age 18 and up); Future    6. Benign essential hypertension  - lisinopril (ZESTRIL) 20 MG tablet; Take 1 tablet (20 mg) by mouth daily  Dispense: 90 tablet; Refill: 0    7. BMI > 40.0 (H)  Will talk to Lauren Bloch, RD about plan for topamax. Not getting great results on 75mg - ?push dose or titrate off. Is feeling some sedation with it.     There are no Patient Instructions on file for this visit.    Return in about 7 weeks (around 9/25/2020) for As Scheduled.    Jeff Contreras MD  [facility] ER[facility]      Video-Visit Details    Type of service:  Video Visit    Video End Time:9:57 AM    Originating Location (pt.  Location): Home    Distant Location (provider location):  [facility] [facility]     Platform used for Video Visit: AmWell    Return in about 7 weeks (around 9/25/2020) for As Scheduled.     Jeff Contreras MD      Answers for HPI/ROS submitted by the patient on 8/6/2020   Chronic problems general questions HPI Form  How many servings of fruits and vegetables do you eat daily?: 2-3  On average, how many sweetened beverages do you drink each day (Examples: soda, juice, sweet tea, etc.  Do NOT count diet or artificially sweetened beverages)?: 1  How many minutes a day do you exercise enough to make your heart beat faster?: 9 or less  How many days a week do you exercise enough to make your heart beat faster?: 3 or less  How many days per week do you miss taking your medication?: 3  What makes it hard for you to take your medication every day?: remembering to take

## 2020-08-06 NOTE — TELEPHONE ENCOUNTER
Additional Information    Question about upcoming scheduled test, no triage required and triager able to answer question    Protocols used: INFORMATION ONLY CALL-A-AH

## 2020-08-06 NOTE — TELEPHONE ENCOUNTER
Spoke to patient, relayed message below. Mammo/Ultrasound has already called patient. Transferred her directly to them for scheduling.    Bryanna Chand,

## 2020-08-07 ENCOUNTER — TELEPHONE (OUTPATIENT)
Dept: PEDIATRICS | Facility: CLINIC | Age: 40
End: 2020-08-07

## 2020-08-07 ENCOUNTER — MYC MEDICAL ADVICE (OUTPATIENT)
Dept: PEDIATRICS | Facility: CLINIC | Age: 40
End: 2020-08-07

## 2020-08-09 LAB — A1AT STL-MCNT: 0.66 MG/G (ref 0–0.5)

## 2020-08-11 ENCOUNTER — TELEPHONE (OUTPATIENT)
Dept: PEDIATRICS | Facility: CLINIC | Age: 40
End: 2020-08-11

## 2020-08-11 ENCOUNTER — HOSPITAL ENCOUNTER (OUTPATIENT)
Dept: MAMMOGRAPHY | Facility: CLINIC | Age: 40
End: 2020-08-11
Attending: INTERNAL MEDICINE
Payer: COMMERCIAL

## 2020-08-11 ENCOUNTER — HOSPITAL ENCOUNTER (OUTPATIENT)
Dept: ULTRASOUND IMAGING | Facility: CLINIC | Age: 40
End: 2020-08-11
Attending: INTERNAL MEDICINE
Payer: COMMERCIAL

## 2020-08-11 DIAGNOSIS — N64.4 BREAST PAIN: ICD-10-CM

## 2020-08-11 DIAGNOSIS — L03.818 CELLULITIS OF OTHER SPECIFIED SITE: Primary | ICD-10-CM

## 2020-08-11 PROCEDURE — 76642 ULTRASOUND BREAST LIMITED: CPT | Mod: RT

## 2020-08-11 PROCEDURE — 77066 DX MAMMO INCL CAD BI: CPT

## 2020-08-12 RX ORDER — CEPHALEXIN 500 MG/1
500 CAPSULE ORAL 2 TIMES DAILY
Qty: 14 CAPSULE | Refills: 0 | Status: SHIPPED | OUTPATIENT
Start: 2020-08-12 | End: 2020-08-19

## 2020-08-12 NOTE — TELEPHONE ENCOUNTER
Spoke to patient in another encounter about this.     Patient to start Keflex today.     Patient expressed understanding and acceptance of the plan and had no further questions at this time. Advised to call back if worsening symptoms or no improvement noted. Also advised can call back to clinic at any time with concerns.     Inocencia Basurto RN Flex

## 2020-08-12 NOTE — TELEPHONE ENCOUNTER
Copied from results note:  Called patient, she states that she was seen at the breast center and was told that her issue was most likely a cyst and to apply warm compresses.       Her breast is  and red. The redness is not spreading but has not gotten smaller. There is no discharge. Patient will send a photo of the affected area. Patient will call back if she develops signs of worsening infection.       Bibi Matthews RN on 8/11/2020 at 3:51 PM          Diane, Jeff Glover MD   8/11/2020  2:14 PM       Please call patient. How is the breast area now? ? How is the redness? Spreading? Getting smaller? Any discharge? If she can send me a photo that would be helpful. Likely will need f/u with breast surgery +/- antibiotics if it looks like a cellulitis.       RACHEL Contreras MD        I agree with the warm compresses.     I would like her to see the Breast surgeon bc if epidermoid cyst will likely need to be excised.     I had placed this order earlier - does she already have an appointment scheduled?  [facility] [phone]    Will also await pictures but if not worsening lower likelihood of cellulitis.    Thank you!  RACHEL Contreras MD  Internal Medicine-Pediatrics

## 2020-08-12 NOTE — TELEPHONE ENCOUNTER
Spoke to patient.    Patient expressed understanding and acceptance of the plan and had no further questions at this time. Advised to call back if worsening symptoms or no improvement noted. Also advised can call back to clinic at any time with concerns.     Inocencia Basurto RN Flex

## 2020-08-12 NOTE — TELEPHONE ENCOUNTER
Photos are in a different encounter. It looks more erythematous to me - will start keflex. Please have her call if no improvement towards the end of the week and we can add MRSA coverage. However I think having it looked at by Surgery is going to the be best.     RACHEL Contreras MD  Internal Medicine-Pediatrics

## 2020-08-14 ENCOUNTER — TELEPHONE (OUTPATIENT)
Dept: PEDIATRICS | Facility: CLINIC | Age: 40
End: 2020-08-14

## 2020-08-14 DIAGNOSIS — E88.09 HYPOALBUMINEMIA: Primary | ICD-10-CM

## 2020-08-14 NOTE — TELEPHONE ENCOUNTER
Call placed to pt to relay message from provider    Pt verbalized understanding and agrees to the plan    Lester Teixeira RN on 8/14/2020 at 12:31 PM

## 2020-08-14 NOTE — TELEPHONE ENCOUNTER
I would like her to see the breast surgeons - I think this is an epidermoid cyst and typically these are excised. I am not sure about the call for a biopsy - maybe they were calling to plan an excision? Can we call and clarify for her?    I did cover her with keflex in case there was a skin infection on top of this.     I would like her to meet with GI to talk about the stool study. It's not significantly elevated but I don't have another great reason for her albumin to be low. I placed this referral - please give her this information.     I do think that the elevated CRP (inflammatory maker) we olga lidia could have been high because of her breast cyst.     RACHEL Contreras MD  Internal Medicine-Pediatrics

## 2020-08-14 NOTE — TELEPHONE ENCOUNTER
Dr. Contreras,    Do you still want pt to see surgery for breast lump?  Pt would like an explanation of her lab results  Please advise  US results  Focussed ultrasound by radiologist and technologist of the upper   outer/lower outer quadrants of the RIGHT breast was performed. Oval   hypoechoic area is seen within the skin at the patient's palpable area   of concern, 9:00 5 cm from the nipple on the RIGHT. This measures 2.0   x 1.7 x 0.4 cm and lies within the erythematous area.     Findings are consistent with either a ruptured epidermal inclusion   cyst/sebaceous cyst, versus focal cellulitis. No abscess identified.    Impression     Thank you  Lester Teixeira RN on 8/14/2020 at 10:44 AM

## 2020-08-14 NOTE — TELEPHONE ENCOUNTER
Test Results    Who is calling?:  Dora    Who ordered the test:  Dr. Daya Damian      Type of test: Lab   Alpha 1 antitrypsin stool    Date of test:  8/6/2020    Where was the test performed:  [facility]    What are your questions/concerns?:  Is this why she is loosing so much protein? Also, she is wondering why the surgery center called already for a biopsy     Okay to leave a detailed message?:  Yes

## 2020-08-17 ENCOUNTER — MYC MEDICAL ADVICE (OUTPATIENT)
Dept: SLEEP MEDICINE | Facility: CLINIC | Age: 40
End: 2020-08-17

## 2020-08-18 NOTE — PROGRESS NOTES
"Dora Barrera is a 40 year old female who is being evaluated via a billable video visit.      The patient has been notified of following:     \"This video visit will be conducted via a call between you and your physician/provider. We have found that certain health care needs can be provided without the need for an in-person physical exam.  This service lets us provide the care you need with a video conversation.  If a prescription is necessary we can send it directly to your pharmacy.  If lab work is needed we can place an order for that and you can then stop by our lab to have the test done at a later time.    Video visits are billed at different rates depending on your insurance coverage.  Please reach out to your insurance provider with any questions.    If during the course of the call the physician/provider feels a video visit is not appropriate, you will not be charged for this service.\"    Patient has given verbal consent for Video visit? Yes  How would you like to obtain your AVS? MyChart  If you are dropped from the video visit, the video invite should be resent to: Send to e-mail at: [email or web address]  Will anyone else be joining your video visit? No        Video-Visit Details    Type of service:  Video Visit    Video Start Time: 1:03 PM  Video End Time: 1:50 PM    Originating Location (pt. Location): Home    Distant Location (provider location):  [facility]     Platform used for Video Visit: Jhoan Valladares PA-C    [facility] [place]   Outpatient Sleep Medicine Consultation  August 19, 2020      Name: Dora Barrera MRN# [id number]   Age: 40 year old YOB: 1980     Date of Consultation: August 19, 2020  Consultation is requested by: Jeff Contreras MD  [address] [place] [address] Jeff Contreras  Primary care provider: Jeff Contreras       Chief Complaint / Reason for Sleep Consult: " "    \"Confirm if I have sleep apnea or not\"         History of Present Illness:     Dora Barrera is a 40 year old female who presents to the clinic for evaluation of daytime tiredness and suspicion for obstructive sleep apnea. Other past medical history significant for HTN, anxiety, and morbid obesity.     Patient reports the primary reason for today's visit is to silence her father's concerns.  States that her father is concerned that she has sleep apnea given excessive yawning/daytime tiredness (he has AUGIE himself and had similar presenting symptoms). Dora attributes yawning and tiredness to \"situational\" reasons - sadly, her mother recently passed away and so she attributes all of her daytime exhaustion/tiredness to dealing with her mother's health issues over the past few months prior to her passing.     She does not have a regular bed partner and sleeps alone, so sleeping history is somewhat unknown.  She does admit to occasional snoring, but does not believe she snores with any regularity.  She does sometimes wake herself up from naps due to a snort but this only occurs when she is sleeping on her back on the couch.  She typically only sleeps on her sides or stomach when in bed.  No known witnessed apneas.  No gasp arousals or choking episodes.  Admits to nocturia once or twice a night. Reports nightly nocturnal GERD, treated with OTC antacid prn/if particularly bothersome. She denies morning headaches or confusion.  Denies morning dry mouth. Denies nasal/sinus congestion.     Upon waking, feels unrefreshed and unrested despite getting 8+ hours of sleep per night. On weekdays, goes to sleep at 10:00PM and wakes at 7:00AM with an alarm.  On weekends, goes to sleep between 10:00PM-12:00AM and wakes between 8-10:00AM. Falls sleep in 15-30 minutes.  Wakes 3-5 times per night for various reasons including restlessness/changing sleeping position, to use the bathroom, or for her dog.  Denies any difficulty " "falling back asleep when she returns to bed, generally only awake for 10 to 20 minutes at a time. She does read, use phone/tablet, and work in bed.    Dora does admit to napping 2 or 3 times per week.  Napping most commonly occurs on the weekends.  When she does take a nap, will nap from anywhere between 20 minutes to 2 hours.  She does generally feel better after napping, but feels much more rested if it is a shorter nap such as 20 to 45 minutes, and not as rested from longer naps such as 2 hours.  Denies any inadvertent dozing during the day.  Denies any difficulty staying awake during sedentary tasks.  Denies any history of falling asleep or dozing while driving.  No accidents or near accidents from drowsy driving. Patient was counseled on the importance of driving while alert, to pull over if drowsy, or nap before getting into the vehicle if sleepy.    Patient denies any abnormal movements or behaviors in their sleep. No dream enactment behavior. No somniloquy.  No somnambulism.  No nightmares or night terrors. Unknown bruxism, \"I think I might clench I don't know\".     Patient denies typical restless legs syndrome symptoms, but does state \"I know I wiggle my feet sometimes in bed but I don't feel the urge to or its not because of a weird feeling at all,  I think it's just an anxiety thing\". No known kicking/nocturnal leg movements, but does wake up to messy sheets in the morning.     SCALES       SLEEP APNEA: Stopbang score  3-4/8       INSOMNIA:  Insomnia severity score: 11/28       SLEEPINESS: [place] sleepiness scale: 7 [normal < 11]          Medications:     Current Outpatient Medications   Medication Sig     cephALEXin (KEFLEX) 500 MG capsule Take 1 capsule (500 mg) by mouth 2 times daily for 7 days     escitalopram (LEXAPRO) 20 MG tablet Take 1 tablet (20 mg) by mouth daily     lisinopril (ZESTRIL) 20 MG tablet Take 1 tablet (20 mg) by mouth daily     norgestimate-ethinyl estradiol (SPRINTEC 28) 0.25-35 " MG-MCG tablet Take 1 tablet by mouth daily     topiramate (TOPAMAX) 25 MG tablet Take 25 mg with dinner for one week, then increase to 50 mg for one week, then increase to 75 mg. (Patient not taking: Reported on 8/19/2020)     No current facility-administered medications for this visit.              Allergies:     No Known Allergies         Past Medical History:     Past Medical History:   Diagnosis Date     Anxiety      Hypertension              Past Surgical History:    Previous upper airway surgery: none   Past Surgical History:   Procedure Laterality Date     CHOLECYSTECTOMY       HC ASPIRATION OF OVARIAN CYST Left      OOPHORECTOMY Right     Ovarian cyst, torsion            Social History:     Social History     Tobacco Use     Smoking status: Never Smoker     Smokeless tobacco: Never Used   Substance Use Topics     Alcohol use: Yes     Comment: occ     Chemical History:  Alcohol use: 1-2 drinks 1-2 times per week.   Tobacco use: Denies   Illicit substances: None  Caffeine intake: Drinks 1 cup of coffee and 1 can of pop per day. Last caffeine intake is usually at lunch, but rarely with dinner.          Family History:     Family History   Problem Relation Age of Onset     Hypertension Mother      Cancer Mother 72        dx with kidney cancer - stage 4     Heart Disease Father      Diabetes Father      Hypertension Father      Colon Cancer No family hx of      Breast Cancer No family hx of         Sleep Family Hx: Patient's father with AUGIE treated with CPAP and mouthguard. Denies any known family history of narcolepsy, restless legs syndrome, insomnia, or parasomnias.          Review of Systems:   A complete 10 point review of systems was negative other than HPI or as commented below:   CONSTITUTIONAL: NEGATIVE for weight gain/loss, fever, chills, sweats or night sweats, drug allergies.  EYES: NEGATIVE for changes in vision, blind spots, double vision.  ENT: NEGATIVE for ear pain, sore throat, sinus pain,  "post-nasal drip, runny nose, bloody nose  CARDIAC: NEGATIVE for fast heartbeats or fluttering in chest, chest pain or pressure, breathlessness when lying flat, swollen legs or swollen feet.  NEUROLOGIC: NEGATIVE headaches, weakness or numbness in the arms or legs.  DERMATOLOGIC: NEGATIVE for rashes, new moles or change in mole(s)  PULMONARY:  POSITIVE for  SOB with activity and NEGATIVE for  SOB at rest, dry cough, productive cough, coughing up blood and wheezing   GASTROINTESTINAL:  POSITIVE for  loose or watery stools and fat or grease in stools and NEGATIVE for  nausea, vomiting, constipation, abdominal pain, bowel movements black in color and blood in stool  GENITOURINARY: NEGATIVE for pain during urination, blood in urine, urinating more frequently than usual, irregular menstrual periods.  MUSCULOSKELETAL: NEGATIVE for muscle pain, bone or joint pain, swollen joints.  ENDOCRINE: NEGATIVE for increased thirst or urination, diabetes.  LYMPHATIC: NEGATIVE for swollen lymph nodes, lumps or bumps in the breasts or nipple discharge.  PSYCH: POSITIVE for anxiety and depression.         Physical Examination:   There were no vitals taken for this visit.  Vitals - Patient Reported  Weight (Patient Reported): 116.1 kg (256 lb)  Height (Patient Reported): 157.5 cm (5' 2\")  BMI (Based on Pt Reported Ht/Wt): 46.82  Pain Score: No Pain (0)  General appearance: Obese.  Awake, alert, cooperative. Well groomed. Sitting comfortably in chair. In no apparent distress.  Tearful at end of visit.   HEENT: Head: Normocephalic, atraumatic. Eyes:Conjunctiva clear. Sclera normal. Nose: External appearance without deformity.   Neck: Appears visually enlarged.   Cardiovascular: No JVD  Pulmonary:  Able to speak easily in full sentences. No cough or wheeze.   Skin:  No rashes or significant lesions on visible skin.   Neurologic: Alert, oriented x3.   Psychiatric: Mood euthymic. Affect congruent with full range and intensity.         Data: " All pertinent previous laboratory data reviewed     No results found for: PH, PHARTERIAL, PO2, RY9AUBPKCMC, SAT, PCO2, HCO3, BASEEXCESS, WAGNER, BEB  No results found for: TSH  Lab Results   Component Value Date    GLC 87 06/25/2020     (H) 04/12/2019     No results found for: HGB  Lab Results   Component Value Date    BUN 12 06/25/2020    BUN 12 04/12/2019    CR 0.69 06/25/2020    CR 0.62 04/12/2019     Lab Results   Component Value Date    AST 27 06/25/2020    AST 16 04/12/2019    ALT 43 06/25/2020    ALT 20 04/12/2019    ALKPHOS 47 06/25/2020    ALKPHOS 46 04/12/2019    BILITOTAL 0.4 06/25/2020    BILITOTAL 0.5 04/12/2019     No results found for: UAMP, UBARB, BENZODIAZEUR, UCANN, UCOC, OPIT, UPCP           Assessment and Plan:   1. Suspected sleep apnea  2. Daytime sleepiness  3. Unrefreshed by sleep  4. Snoring  5. Morbid obesity (H)  6. Benign essential hypertension  7. Anxiety    Patient is being evaluated for Obstructive Sleep Apnea (AUGIE).  Patient's risk factors/symptoms consistent with AUGIE include: snoring, enlarged neck girth (via visual inspection, unable to measure today due to virtual visit), obesity (BMI 46.82 per patient reported vitals), high blood pressure, daytime sleepiness (ESS score normal at 7), family history of sleep apnea in father, snort arousals when sleeping supine, nocturnal GERD, and unrefreshing sleep.  Official STOP-BANG score 3 out of 8 today, though likely 4 out of 8 if we were able to measure neck circumfrence.    We discussed pathophysiology of AUGIE and consequences of untreated moderate to severe sleep apnea such as a higher risk of hypertension, cardiovascular disease, cardiac arrhythmias, and stroke with worse outcomes after these events, as well as poor control of hypertension and diabetes. Patient is interested in treatment and willing to undergo overnight sleep testing.    Discussed testing with overnight attended polysomnography versus home sleep apnea testing.  Patient  "became tearful when discussing which route of testing she would like to pursue. States she has \"a lot going on in my life and with my health\".  We agreed not to place any orders today, and Dora will think about these options and discuss coverage with insurance company prior to making any final decisions. We did discuss how home sleep testing is less ideal for patient given BMI >40 and risk for obesity hypoventilation, but patient will ultimately let me know via MyChart or via phone which route of testing she would like to pursue.     Briefly discussed potential treatment modalities in the event that testing is positive and additional information given in patient instructions. At this time open to any/all potential treatments.     Follow up 1-2 weeks following her study for review of results and to expedite care.       Copy to: Jeff Contreras PA-C  Aug 19, 2020     [facility]  [address] [place] [address]     [facility] [place]  [address] [address]    Chart documentation was completed, in part, with [employer] voice-recognition software. Even though reviewed, some grammatical, spelling, and word errors may remain.      "

## 2020-08-19 ENCOUNTER — PREP FOR PROCEDURE (OUTPATIENT)
Dept: SURGERY | Facility: CLINIC | Age: 40
End: 2020-08-19

## 2020-08-19 ENCOUNTER — VIRTUAL VISIT (OUTPATIENT)
Dept: SLEEP MEDICINE | Facility: CLINIC | Age: 40
End: 2020-08-19
Attending: INTERNAL MEDICINE
Payer: COMMERCIAL

## 2020-08-19 ENCOUNTER — OFFICE VISIT (OUTPATIENT)
Dept: SURGERY | Facility: CLINIC | Age: 40
End: 2020-08-19
Payer: COMMERCIAL

## 2020-08-19 VITALS
HEART RATE: 79 BPM | WEIGHT: 256 LBS | RESPIRATION RATE: 16 BRPM | BODY MASS INDEX: 45.36 KG/M2 | OXYGEN SATURATION: 97 % | HEIGHT: 63 IN | SYSTOLIC BLOOD PRESSURE: 122 MMHG | DIASTOLIC BLOOD PRESSURE: 78 MMHG

## 2020-08-19 DIAGNOSIS — I10 BENIGN ESSENTIAL HYPERTENSION: ICD-10-CM

## 2020-08-19 DIAGNOSIS — L72.0 EPIDERMAL CYST: Primary | ICD-10-CM

## 2020-08-19 DIAGNOSIS — R06.83 SNORING: ICD-10-CM

## 2020-08-19 DIAGNOSIS — R29.818 SUSPECTED SLEEP APNEA: ICD-10-CM

## 2020-08-19 DIAGNOSIS — R40.0 DAYTIME SLEEPINESS: Primary | ICD-10-CM

## 2020-08-19 DIAGNOSIS — E66.01 MORBID OBESITY (H): ICD-10-CM

## 2020-08-19 DIAGNOSIS — F41.9 ANXIETY: ICD-10-CM

## 2020-08-19 DIAGNOSIS — G47.8 UNREFRESHED BY SLEEP: ICD-10-CM

## 2020-08-19 PROCEDURE — 99243 OFF/OP CNSLTJ NEW/EST LOW 30: CPT | Performed by: SURGERY

## 2020-08-19 PROCEDURE — 99204 OFFICE O/P NEW MOD 45 MIN: CPT | Mod: 95 | Performed by: PHYSICIAN ASSISTANT

## 2020-08-19 ASSESSMENT — MIFFLIN-ST. JEOR: SCORE: 1797.4

## 2020-08-19 NOTE — PROGRESS NOTES
Assessment:    Dora Barrera is a 40 year old female seen in consultation for a right breast redness, at the request of Jeff Contreras MD.    Dora is a 40 year old female with a painful right breast epidermal cyst at  9:00 and 2 cm from the nipple. Improving on antibiotics    PLAN:  Discussed options for close observation on antibiotics vs excision.  I am recommending excision to prevent recurrence of cyst infection.      She will likely proceed with excision, but will call to schedule when she can.      HPI:  Dora Barrera is a 40 year old female with a right breast painful mass under the skin.      Duration:  2 weeks. Had similar swelling and redness in June/July that resolved on its own  Location:  9 o'clock  Near the areola  Increase in size:  initially got swollen, now less  Fever/chills:  No  History of breast infections:  No  Antibiotic treatment:  Yes finishing keflex  Breast pain:  Yes, improving    Nipple discharge:  none  Performs self breast exams:  No  Previous breast biopsies:  Yes - right side 2006/2008  Previous cyst aspiration:  No  Previous other breast surgery:  No     Hormonal history:  Premenopausal, no HRT, no fertility treatment.    Family history of breast cancer: No  Family history of ovarian cancer: No    Imaging:  All imaging studies reviewed by me.    Recent Results (from the past 744 hour(s))   MA Diagnostic Bilateral w/Gt    Narrative    Examination: Bilateral digital diagnostic mammography and digital  breast tomosynthesis with computer aided detection, and focused  ultrasound of the RIGHT breast, 8/11/2020.    Comparison: None    History: Tender erythematous lump in the RIGHT breast.    BREAST DENSITY: Scattered fibroglandular densities    Findings: No concerning mammographic/tomographic findings in either  breast.    Focussed ultrasound by radiologist and technologist of the upper  outer/lower outer quadrants of the RIGHT breast was performed. Oval  hypoechoic area  is seen within the skin at the patient's palpable area  of concern, 9:00 5 cm from the nipple on the RIGHT. This measures 2.0  x 1.7 x 0.4 cm and lies within the erythematous area.    Findings are consistent with either a ruptured epidermal inclusion  cyst/sebaceous cyst, versus focal cellulitis. No abscess identified.      Impression    IMPRESSION: BI-RADS CATEGORY: 2 - Benign Finding(s).    RECOMMENDED FOLLOW-UP: Clinical follow-up and routine annual  mammography beginning at age 40.      The patient was given the results of the examination.      JHONY BAKER MD   US Breast Right Limited 1-3 Quadrants    Narrative    Examination: Bilateral digital diagnostic mammography and digital  breast tomosynthesis with computer aided detection, and focused  ultrasound of the RIGHT breast, 8/11/2020.    Comparison: None    History: Tender erythematous lump in the RIGHT breast.    BREAST DENSITY: Scattered fibroglandular densities    Findings: No concerning mammographic/tomographic findings in either  breast.    Focussed ultrasound by radiologist and technologist of the upper  outer/lower outer quadrants of the RIGHT breast was performed. Oval  hypoechoic area is seen within the skin at the patient's palpable area  of concern, 9:00 5 cm from the nipple on the RIGHT. This measures 2.0  x 1.7 x 0.4 cm and lies within the erythematous area.    Findings are consistent with either a ruptured epidermal inclusion  cyst/sebaceous cyst, versus focal cellulitis. No abscess identified.      Impression    IMPRESSION: BI-RADS CATEGORY: 2 - Benign Finding(s).    RECOMMENDED FOLLOW-UP: Clinical follow-up and routine annual  mammography beginning at age 40.      The patient was given the results of the examination.      JHONY BAKER MD       Percutaneous core needle biopsy: not done     Past Medical History:   has a past medical history of Anxiety and Hypertension.    Past Surgical History:  Past Surgical History:   Procedure Laterality  "Date     CHOLECYSTECTOMY       HC ASPIRATION OF OVARIAN CYST Left      OOPHORECTOMY Right     Ovarian cyst, torsion       Family History:  Family History   Problem Relation Age of Onset     Hypertension Mother      Cancer Mother 72        dx with kidney cancer - stage 4     Heart Disease Father      Diabetes Father      Hypertension Father      Colon Cancer No family hx of      Breast Cancer No family hx of         Social History:  Social History     Tobacco Use     Smoking status: Never Smoker     Smokeless tobacco: Never Used   Substance Use Topics     Alcohol use: Yes     Comment: occ     Drug use: Never     ROS:  The 10 point review of systems is negative other than noted in the HPI and above.    PE:  Vitals: /78   Pulse 79   Resp 16   Ht 1.588 m (5' 2.5\")   Wt 116.1 kg (256 lb)   SpO2 97%   BMI 46.08 kg/m    General appearance: well-nourished, sitting comfortably, no apparent distress  Respirations:  Unlabored  CV: Reg pulse   Extremities: Warm, without edema  Neurologic: alert, speech is clear, moves all extremities with good strength  Psychiatric: Mood and affect are appropriate  Skin: Without lesions, rashes, or jaundice  Breast:    Symmetrical    Right breast 9 oclock 2cm from nipple there is a 1cm area of faint redness with mild flaking of the skin. No fluctuance or evidence of fluid.    Contour is normal.   Parenchyma is soft.   Masses- none    Incisional scar- none    Lymph:      No supraclavicular/infraclavicular adenopathy.    Axillary adenopathy: none      Fay Mcgraw MD    Please route or send letter to:  Primary Care Provider (PCP)          "

## 2020-08-19 NOTE — PATIENT INSTRUCTIONS
"MY TREATMENT INFORMATION FOR SLEEP APNEA-  Dora Barrera    Am I having a sleep study at a sleep center?  We will call you to schedule!    Am I having a home sleep study?  Watch this video:  /drop off device-   [email or web address]    Please verify your insurance coverage with your insurance carrier    Frequently asked questions:  1. What is Obstructive Sleep Apnea (AUGIE)? AUGIE is the most common type of sleep apnea. Apnea means, \"without breath.\"  Apnea is most often caused by narrowing or collapse of the upper airway as muscles relax during sleep.   Almost everyone has occasional apneas. Most people with sleep apnea have had brief interruptions at night frequently for many years.  The severity of sleep apnea is related to how frequent and severe the events are.   2. What are the consequences of AUGIE? Symptoms include: feeling sleepy during the day, snoring loudly, gasping or stopping of breathing, trouble sleeping, and occasionally morning headaches or heartburn at night.  Sleepiness can be serious and even increase the risk of falling asleep while driving. Other health consequences may include development of high blood pressure and other cardiovascular disease in persons who are susceptible. Untreated AUGIE  can contribute to heart disease, stroke and diabetes.   3. What are the treatment options? In most situations, sleep apnea is a lifelong disease that must be managed with daily therapy. Medications are not effective for sleep apnea and surgery is generally not considered until other therapies have been tried. Your treatment is your choice . Continuous Positive Airway (CPAP) works right away and is the therapy that is effective in nearly everyone. An oral device to hold your jaw forward is usually the next most reliable option. Other options include postioning devices (to keep you off your back), weight loss, and surgery including a tongue pacing device. There is more detail about " some of these options below.    Important tips for using CPAP and similar devices   Know your equipment:  CPAP is continuous positive airway pressure that prevents obstructive sleep apnea by keeping the throat from collapsing while you are sleeping. In most cases, the device is  smart  and can slowly self-adjusts if your throat collapses and keeps a record every day of how well you are treated-this information is available to you and your care team.  BPAP is bilevel positive airway pressure that keeps your throat open and also assists each breath with a pressure boost to maintain adequate breathing.  Special kinds of BPAP are used in patients who have inadequate breathing from lung or heart disease. In most cases, the device is  smart  and can slowly self-adjusts to assist breathing. Like CPAP, the device keeps a record of how well you are treated.  Your mask is your connection to the device. You get to choose what feels most comfortable and the staff will help to make sure if fits. Here: are some examples of the different masks that are available:       Key points to remember on your journey with sleep apnea:  1. Sleep study.  PAP devices often need to be adjusted during a sleep study to show that they are effective and adjusted right.  2. Good tips to remember: Try wearing just the mask during a quiet time during the day so your body adapts to wearing it. A humidifier is recommended for comfort in most cases to prevent drying of your nose and throat. Allergy medication from your provider may help you if you are having nasal congestion.  3. Getting settled-in. It takes more than one night for most of us to get used to wearing a mask. Try wearing just the mask during a quiet time during the day so your body adapts to wearing it. A humidifier is recommended for comfort in most cases. Our team will work with you carefully on the first day and will be in contact within 4 days and again at 2 and 4 weeks for advice and  remote device adjustments. Your therapy is evaluated by the device each day.   4. Use it every night. The more you are able to sleep naturally for 7-8 hours, the more likely you will have good sleep and to prevent health risks or symptoms from sleep apnea. Even if you use it 4 hours it helps. Occasionally all of us are unable to use a medical therapy, in sleep apnea, it is not dangerous to miss one night.   5. Communicate. Call our skilled team on the number provided on the first day if your visit for problems that make it difficult to wear the device. Over 2 out of 3 patients can learn to wear the device long-term with help from our team. Remember to call our team or your sleep providers if you are unable to wear the device as we may have other solutions for those who cannot adapt to mask CPAP therapy. It is recommended that you sleep your sleep provider within the first 3 months and yearly after that if you are not having problems.   6. Use it for your health. We encourage use of CPAP masks during daytime quiet periods to allow your face and brain to adapt to the sensation of CPAP so that it will be a more natural sensation to awaken to at night or during naps. This can be very useful during the first few weeks or months of adapting to CPAP though it does not help medically to wear CPAP during wakefulness and  should not be used as a strategy just to meet guidelines.  7. Take care of your equipment. Make sure you clean your mask and tubing using directions every day and that your filter and mask are replaced as recommended or if they are not working.     BESIDES CPAP, WHAT OTHER THERAPIES ARE THERE?    Positioning Device  Positioning devices are generally used when sleep apnea is mild and only occurs on your back.This example shows a pillow that straps around the waist. It may be appropriate for those whose sleep study shows milder sleep apnea that occurs primarily when lying flat on one's back. Preliminary  studies have shown benefit but effectiveness at home may need to be verified by a home sleep test. These devices are generally not covered by medical insurance.  Examples of devices that maintain sleeping on the back to prevent snoring and mild sleep apnea.    Belt type body positioner  Http://[employer].BONESUPPORT/    Electronic reminder  Http://nightshifttherapy.com/  Http://www.Iotelligentd.com.au/      Oral Appliance  What is oral appliance therapy?  An oral appliance device fits on your teeth at night like a retainer used after having braces. The device is made by a specialized dentist and requires several visits over 1-2 months before a manufactured device is made to fit your teeth and is adjusted to prevent your sleep apnea. Once an oral device is working properly, snoring should be improved. A home sleep test may be recommended at that time if to determine whether the sleep apnea is adequately treated.       Some things to remember:  -Oral devices are often, but not always, covered by your medical insurance. Be sure to check with your insurance provider.   -If you are referred for oral therapy, you will be given a list of specialized dentists to consider or you may choose to visit the Web site of the American Academy of Dental Sleep Medicine  -Oral devices are less likely to work if you have severe sleep apnea or are extremely overweight.     More detailed information  An oral appliance is a small acrylic device that fits over the upper and lower teeth  (similar to a retainer or a mouth guard). This device slightly moves jaw forward, which moves the base of the tongue forward, opens the airway, improves breathing for effective treat snoring and obstructive sleep apnea in perhaps 7 out of 10 people .  The best working devices are custom-made by a dental device  after a mold is made of the teeth 1, 2, 3.  When is an oral appliance indicated?  Oral appliance therapy is recommended as a first-line treatment for  patients with primary snoring, mild sleep apnea, and for patients with moderate sleep apnea who prefer appliance therapy to use of CPAP4, 5. Severity of sleep apnea is determined by sleep testing and is based on the number of respiratory events per hour of sleep.   How successful is oral appliance therapy?  The success rate of oral appliance therapy in patients with mild sleep apnea is 75-80% while in patients with moderate sleep apnea it is 50-70%. The chance of success in patients with severe sleep apnea is 40-50%. The research also shows that oral appliances have a beneficial effect on the cardiovascular health of AUGIE patients at the same magnitude as CPAP therapy7.  Oral appliances should be a second-line treatment in cases of severe sleep apnea, but if not completely successful then a combination therapy utilizing CPAP plus oral appliance therapy may be effective. Oral appliances tend to be effective in a broad range of patients although studies show that the patients who have the highest success are females, younger patients, those with milder disease, and less severe obesity. 3, 6.   Finding a dentist that practices dental sleep medicine  Specific training is available through the American Academy of Dental Sleep Medicine for dentists interested in working in the field of sleep. To find a dentist who is educated in the field of sleep and the use of oral appliances, near you, visit the Web site of the American Academy of Dental Sleep Medicine.    References  1. Edwin et al. Objectively measured vs self-reported compliance during oral appliance therapy for sleep-disordered breathing. Chest 2013; 144(5): 4734-5006.  2. Barbara et al. Objective measurement of compliance during oral appliance therapy for sleep-disordered breathing. Thorax 2013; 68(1): 91-96.  3. Candido et al. Mandibular advancement devices in 620 men and women with AUGIE and snoring: tolerability and predictors of treatment success.  Chest 2004; 125: 4609-8366.  4. Mauricio et al. Oral appliances for snoring and AUGIE: a review. Sleep 2006; 29: 244-262.  5. Trenton et al. Oral appliance treatment for AUGIE: an update. J Clin Sleep Med 2014; 10(2): 215-227.  6. Tony et al. Predictors of OSAH treatment outcome. J Dent Res 2007; 86: 9638-0899.      Weight Loss:    Weight loss is a long-term strategy that may improve sleep apnea in some patients.    Weight management is a personal decision and the decision should be based on your interest and the potential benefits.  If you are interested in exploring weight loss strategies, the following discussion covers the impact on weight loss on sleep apnea and the approaches that may be successful.    Being overweight does not necessarily mean you will have health consequences.  Those who have BMI over 35 or over 27 with existing medical conditions carries greater risk.   Weight loss decreases severity of sleep apnea in most people with obesity. For those with mild obesity who have developed snoring with weight gain, even 15-30 pound weight loss can improve and occasionally eliminate sleep apnea.  Structured and life-long dietary and health habits are necessary to lose weight and keep healthier weight levels.     Though there may be significant health benefits from weight loss, long-term weight loss is very difficult to achieve- studies show success with dietary management in less than 10% of people. In addition, substantial weight loss may require years of dietary control and may be difficult if patients have severe obesity. In these cases, surgical management may be considered.  Finally, older individuals who have tolerated obesity without health complications may be less likely to benefit from weight loss strategies.        Your BMI is There is no height or weight on file to calculate BMI.  Weight management is a personal decision.  If you are interested in exploring weight loss strategies, the  following discussion covers the approaches that may be successful. Body mass index (BMI) is one way to tell whether you are at a healthy weight, overweight, or obese. It measures your weight in relation to your height.  A BMI of 18.5 to 24.9 is in the healthy range. A person with a BMI of 25 to 29.9 is considered overweight, and someone with a BMI of 30 or greater is considered obese. More than two-thirds of American adults are considered overweight or obese.  Being overweight or obese increases the risk for further weight gain. Excess weight may lead to heart disease and diabetes.  Creating and following plans for healthy eating and physical activity may help you improve your health.  Weight control is part of healthy lifestyle and includes exercise, emotional health, and healthy eating habits. Careful eating habits lifelong are the mainstay of weight control. Though there are significant health benefits from weight loss, long-term weight loss with diet alone may be very difficult to achieve- studies show long-term success with dietary management in less than 10% of people. Attaining a healthy weight may be especially difficult to achieve in those with severe obesity. In some cases, medications, devices and surgical management might be considered.  What can you do?  If you are overweight or obese and are interested in methods for weight loss, you should discuss this with your provider.     Consider reducing daily calorie intake by 500 calories.     Keep a food journal.     Avoiding skipping meals, consider cutting portions instead.    Diet combined with exercise helps maintain muscle while optimizing fat loss. Strength training is particularly important for building and maintaining muscle mass. Exercise helps reduce stress, increase energy, and improves fitness. Increasing exercise without diet control, however, may not burn enough calories to loose weight.       Start walking three days a week 10-20 minutes at a  time    Work towards walking thirty minutes five days a week     Eventually, increase the speed of your walking for 1-2 minutes at time    In addition, we recommend that you review healthy lifestyles and methods for weight loss available through the National Institutes of Health patient information sites:  http://win.niddk.nih.gov/publications/index.htm    And look into health and wellness programs that may be available through your health insurance provider, employer, local community center, or dahiana club.    Weight management plan: Patient was referred to their PCP to discuss a diet and exercise plan.      Surgery:    Surgery for obstructive sleep apnea is considered generally only when other therapies fail to work. Surgery may be discussed with you if you are having a difficult time tolerating CPAP and or when there is an abnormal structure that requires surgical correction.  Nose and throat surgeries often enlarge the airway to prevent collapse.  Most of these surgeries create pain for 1-2 weeks and up to half of the most common surgeries are not effective throughout life.  You should carefully discuss the benefits and drawbacks to surgery with your sleep provider and surgeon to determine if it is the best solution for you.   More information  Surgery for AUGIE is directed at areas that are responsible for narrowing or complete obstruction of the airway during sleep.  There are a wide range of procedures available to enlarge and/or stabilize the airway to prevent blockage of breathing in the three major areas where it can occur: the palate, tongue, and nasal regions.  Successful surgical treatment depends on the accurate identification of the factors responsible for obstructive sleep apnea in each person.  A personalized approach is required because there is no single treatment that works well for everyone.  Because of anatomic variation, consultation with an examination by a sleep surgeon is a critical first step  in determining what surgical options are best for each patient.  In some cases, examination during sedation may be recommended in order to guide the selection of procedures.  Patients will be counseled about risks and benefits as well as the typical recovery course after surgery. Surgery is typically not a cure for a person s AUGIE.  However, surgery will often significantly improve one s AUGIE severity (termed  success rate ).  Even in the absence of a cure, surgery will decrease the cardiovascular risk associated with OSA7; improve overall quality of life8 (sleepiness, functionality, sleep quality, etc).      Palate Procedures:  Patients with AUGIE often have narrowing of their airway in the region of their tonsils and uvula.  The goals of palate procedures are to widen the airway in this region as well as to help the tissues resist collapse.  Modern palate procedure techniques focus on tissue conservation and soft tissue rearrangement, rather than tissue removal.  Often the uvula is preserved in this procedure. Residual sleep apnea is common in patient after pharyngoplasty with an average reduction in sleep apnea events of 33%2.      Tongue Procedures:  ExamWhile patients are awake, the muscles that surround the throat are active and keep this region open for breathing. These muscles relax during sleep, allowing the tongue and other structures to collapse and block breathing.  There are several different tongue procedures available.  Selection of a tongue base procedure depends on characteristics seen on physical exam.  Generally, procedures are aimed at removing bulky tissues in this area or preventing the back of the tongue from falling back during sleep.  Success rates for tongue surgery range from 50-62%3.    Hypoglossal Nerve Stimulation:  Hypoglossal nerve stimulation has recently received approval from the [place] Food and Drug Administration for the treatment of obstructive sleep apnea.  This is based on  research showing that the system was safe and effective in treating sleep apnea6.  Results showed that the median AHI score decreased 68%, from 29.3 to 9.0. This therapy uses an implant system that senses breathing patterns and delivers mild stimulation to airway muscles, which keeps the airway open during sleep.  The system consists of three fully implanted components: a small generator (similar in size to a pacemaker), a breathing sensor, and a stimulation lead.  Using a small handheld remote, a patient turns the therapy on before bed and off upon awakening.    Candidates for this device must be greater than 22 years of age, have moderate to severe AUGIE (AHI between 20-65), BMI less than 32, have tried CPAP/oral appliance without success, and have appropriate upper airway anatomy (determined by a sleep endoscopy performed by Dr. Flynn).    Hypoglossal Nerve Stimulation Pathway:    The sleep surgeon s office will work with the patient through the insurance prior-authorization process (including communications and appeals).    Nasal Procedures:  Nasal obstruction can interfere with nasal breathing during the day and night.  Studies have shown that relief of nasal obstruction can improve the ability of some patients to tolerate positive airway pressure therapy for obstructive sleep apnea1.  Treatment options include medications such as nasal saline, topical corticosteroid and antihistamine sprays, and oral medications such as antihistamines or decongestants. Non-surgical treatments can include external nasal dilators for selected patients. If these are not successful by themselves, surgery can improve the nasal airway either alone or in combination with these other options.      Combination Procedures:  Combination of surgical procedures and other treatments may be recommended, particularly if patients have more than one area of narrowing or persistent positional disease.  The success rate of combination surgery ranges  from 66-80%2,3.    References  1. Vanessa JIMENEZ. The Role of the Nose in Snoring and Obstructive Sleep Apnoea: An Update.  Eur Arch Otorhinolaryngol. 2011; 268: 1365-73.  2.  Enrique SM; Arnaud JA; Randy JR; Pallanch JF; Gilberto MB; Maxine SG; Erna MCDANIELS. Surgical modifications of the upper airway for obstructive sleep apnea in adults: a systematic review and meta-analysis. SLEEP 2010;33(10):7208-1104. Trena BILLINGSLEY. Hypopharyngeal surgery in obstructive sleep apnea: an evidence-based medicine review.  Arch Otolaryngol Head Neck Surg. 2006 Feb;132(2):206-13.  3. Jaya YH1, Uzair Y, Bj MAURO. The efficacy of anatomically based multilevel surgery for obstructive sleep apnea. Otolaryngol Head Neck Surg. 2003 Oct;129(4):327-35.  4. Trena BILLINGSLEY, Goldberg A. Hypopharyngeal Surgery in Obstructive Sleep Apnea: An Evidence-Based Medicine Review. Arch Otolaryngol Head Neck Surg. 2006 Feb;132(2):206-13.  5. Stronasrino PJ et al. Upper-Airway Stimulation for Obstructive Sleep Apnea.  N Engl J Med. 2014 Jan 9;370(2):139-49.  6. Lee Y et al. Increased Incidence of Cardiovascular Disease in Middle-aged Men with Obstructive Sleep Apnea. Am J Respir Crit Care Med; 2002 166: 159-165  7. Bhavin EM et al. Studying Life Effects and Effectiveness of Palatopharyngoplasty (SLEEP) study: Subjective Outcomes of Isolated Uvulopalatopharyngoplasty. Otolaryngol Head Neck Surg. 2011; 144: 623-631.

## 2020-08-19 NOTE — LETTER
2020    RE: Dora Barrera, : 1980      Assessment:    Dora Barrera is a 40 year old female seen in consultation for a right breast redness, at the request of Jeff Contreras MD.     Dora is a 40 year old female with a painful right breast epidermal cyst at  9:00 and 2 cm from the nipple. Improving on antibiotics     PLAN:  Discussed options for close observation on antibiotics vs excision.  I am recommending excision to prevent recurrence of cyst infection.       She will likely proceed with excision, but will call to schedule when she can.      HPI:  Dora Barrera is a 40 year old female with a right breast painful mass under the skin.       Duration:  2 weeks. Had similar swelling and redness in /July that resolved on its own  Location:  9 o'clock  Near the areola  Increase in size:  initially got swollen, now less  Fever/chills:  No  History of breast infections:  No  Antibiotic treatment:  Yes finishing keflex  Breast pain:  Yes, improving     Nipple discharge:  none  Performs self breast exams:  No  Previous breast biopsies:  Yes - right side   Previous cyst aspiration:  No  Previous other breast surgery:  No      Hormonal history:  Premenopausal, no HRT, no fertility treatment.     Family history of breast cancer: No  Family history of ovarian cancer: No     Imaging:  All imaging studies reviewed by me.         Recent Results (from the past 744 hour(s))   MA Diagnostic Bilateral w/Gt     Narrative     Examination: Bilateral digital diagnostic mammography and digital  breast tomosynthesis with computer aided detection, and focused  ultrasound of the RIGHT breast, 2020.     Comparison: None     History: Tender erythematous lump in the RIGHT breast.     BREAST DENSITY: Scattered fibroglandular densities     Findings: No concerning mammographic/tomographic findings in either  breast.     Focussed ultrasound by radiologist and technologist of the  upper  outer/lower outer quadrants of the RIGHT breast was performed. Oval  hypoechoic area is seen within the skin at the patient's palpable area  of concern, 9:00 5 cm from the nipple on the RIGHT. This measures 2.0  x 1.7 x 0.4 cm and lies within the erythematous area.     Findings are consistent with either a ruptured epidermal inclusion  cyst/sebaceous cyst, versus focal cellulitis. No abscess identified.        Impression     IMPRESSION: BI-RADS CATEGORY: 2 - Benign Finding(s).     RECOMMENDED FOLLOW-UP: Clinical follow-up and routine annual  mammography beginning at age 40.        The patient was given the results of the examination.        JHONY BAKER MD   US Breast Right Limited 1-3 Quadrants     Narrative     Examination: Bilateral digital diagnostic mammography and digital  breast tomosynthesis with computer aided detection, and focused  ultrasound of the RIGHT breast, 8/11/2020.     Comparison: None     History: Tender erythematous lump in the RIGHT breast.     BREAST DENSITY: Scattered fibroglandular densities     Findings: No concerning mammographic/tomographic findings in either  breast.     Focussed ultrasound by radiologist and technologist of the upper  outer/lower outer quadrants of the RIGHT breast was performed. Oval  hypoechoic area is seen within the skin at the patient's palpable area  of concern, 9:00 5 cm from the nipple on the RIGHT. This measures 2.0  x 1.7 x 0.4 cm and lies within the erythematous area.     Findings are consistent with either a ruptured epidermal inclusion  cyst/sebaceous cyst, versus focal cellulitis. No abscess identified.        Impression     IMPRESSION: BI-RADS CATEGORY: 2 - Benign Finding(s).     RECOMMENDED FOLLOW-UP: Clinical follow-up and routine annual  mammography beginning at age 40.        The patient was given the results of the examination.        JHONY BAKER MD         Percutaneous core needle biopsy: not done      Past Medical History:  Has a past  "medical history of Anxiety and Hypertension.     ROS:  The 10 point review of systems is negative other than noted in the HPI and above.     PE:  Vitals: /78   Pulse 79   Resp 16   Ht 1.588 m (5' 2.5\")   Wt 116.1 kg (256 lb)   SpO2 97%   BMI 46.08 kg/m    General appearance: well-nourished, sitting comfortably, no apparent distress  Respirations:  Unlabored  CV: Reg pulse   Extremities: Warm, without edema  Neurologic: alert, speech is clear, moves all extremities with good strength  Psychiatric: Mood and affect are appropriate  Skin: Without lesions, rashes, or jaundice  Breast:               Symmetrical               Right breast 9 oclock 2cm from nipple there is a 1cm area of faint redness with mild flaking of the skin. No fluctuance or evidence of fluid.               Contour is normal.              Parenchyma is soft.              Masses- none               Incisional scar- none     Lymph:                 No supraclavicular/infraclavicular adenopathy.               Axillary adenopathy: none        Fay Mcgraw MD       "

## 2020-08-19 NOTE — PROGRESS NOTES
Breast Patients      BREAST PATIENTS (FEMALE)    At what age did your periods begin? 12    What was the date of your last menstrual period? 7-19-20    Have you begun menopause? No    Are you using hormone replacement therapy?  Yes  Type: Sprintec 28  Dosage: 0.25 - 35 mg - mcg    Number of full-term pregnancies: 0    Did you nurse your children? N/A    Are you pregnant now? No    Do you have breast implants? No         BREAST PATIENTS (ALL)    Have you had a previous breast biopsy? Yes  Side: Right  Date: 2006 and 2008    Have you had previous Breast Cancer? No       Review Of Systems  Skin: negative  Eyes: negative  Ears/Nose/Throat: negative  Respiratory: No shortness of breath, dyspnea on exertion, cough, or hemoptysis  Cardiovascular: negative  Gastrointestinal: diarrhea  Genitourinary: negative  Musculoskeletal: negative  Neurologic: negative  Psychiatric: negative  Hematologic/Lymphatic/Immunologic: negative  Endocrine: negative

## 2020-08-24 PROBLEM — L72.0 EPIDERMAL CYST: Status: ACTIVE | Noted: 2020-08-24

## 2020-08-25 ENCOUNTER — MYC MEDICAL ADVICE (OUTPATIENT)
Dept: PEDIATRICS | Facility: CLINIC | Age: 40
End: 2020-08-25

## 2020-08-25 ENCOUNTER — TELEPHONE (OUTPATIENT)
Dept: SURGERY | Facility: CLINIC | Age: 40
End: 2020-08-25

## 2020-08-25 DIAGNOSIS — Z11.59 ENCOUNTER FOR SCREENING FOR OTHER VIRAL DISEASES: Primary | ICD-10-CM

## 2020-08-25 NOTE — TELEPHONE ENCOUNTER
Type of surgery: RIGHT BREAST EXCISION OF SKIN CYST     Location of surgery: [facility]  Date and time of surgery: 9/14/2020 @ 7:30 AM   Surgeon: Fay Mcgraw MD    Pre-Op Appt Date: PATIENT TO SCHEDULE    Post-Op Appt Date: PATIENT TO SCHEDULE     Packet sent out: Yes  Pre-cert/Authorization completed:  Not Applicable  Date: 8/24/2020        RIGHT BREAST EXCISION OF SKIN CYST     GENERAL PT INST TO HAVE H&P WITH DR CHUCK SOSA  30 MIN REQ PA ASSIST JLS NMS

## 2020-08-28 ENCOUNTER — TRANSFERRED RECORDS (OUTPATIENT)
Dept: HEALTH INFORMATION MANAGEMENT | Facility: CLINIC | Age: 40
End: 2020-08-28

## 2020-09-08 ENCOUNTER — TRANSFERRED RECORDS (OUTPATIENT)
Dept: HEALTH INFORMATION MANAGEMENT | Facility: CLINIC | Age: 40
End: 2020-09-08

## 2020-09-09 ENCOUNTER — TRANSFERRED RECORDS (OUTPATIENT)
Dept: HEALTH INFORMATION MANAGEMENT | Facility: CLINIC | Age: 40
End: 2020-09-09

## 2020-09-09 ENCOUNTER — MYC MEDICAL ADVICE (OUTPATIENT)
Dept: SURGERY | Facility: PHYSICIAN GROUP | Age: 40
End: 2020-09-09

## 2020-09-10 ENCOUNTER — OFFICE VISIT (OUTPATIENT)
Dept: PEDIATRICS | Facility: CLINIC | Age: 40
End: 2020-09-10
Payer: COMMERCIAL

## 2020-09-10 ENCOUNTER — AMBULATORY - HEALTHEAST (OUTPATIENT)
Dept: FAMILY MEDICINE | Facility: CLINIC | Age: 40
End: 2020-09-10

## 2020-09-10 VITALS
WEIGHT: 253.3 LBS | OXYGEN SATURATION: 95 % | BODY MASS INDEX: 44.88 KG/M2 | RESPIRATION RATE: 13 BRPM | DIASTOLIC BLOOD PRESSURE: 74 MMHG | TEMPERATURE: 97.7 F | HEIGHT: 63 IN | HEART RATE: 90 BPM | SYSTOLIC BLOOD PRESSURE: 132 MMHG

## 2020-09-10 DIAGNOSIS — Z11.59 ENCOUNTER FOR SCREENING FOR OTHER VIRAL DISEASES: ICD-10-CM

## 2020-09-10 DIAGNOSIS — Z01.818 PREOP GENERAL PHYSICAL EXAM: Primary | ICD-10-CM

## 2020-09-10 DIAGNOSIS — K21.9 GASTROESOPHAGEAL REFLUX DISEASE WITHOUT ESOPHAGITIS: ICD-10-CM

## 2020-09-10 DIAGNOSIS — L72.0 EPIDERMAL CYST: ICD-10-CM

## 2020-09-10 DIAGNOSIS — L03.818 CELLULITIS OF OTHER SPECIFIED SITE: ICD-10-CM

## 2020-09-10 DIAGNOSIS — I10 BENIGN ESSENTIAL HYPERTENSION: ICD-10-CM

## 2020-09-10 PROCEDURE — 99215 OFFICE O/P EST HI 40 MIN: CPT | Performed by: NURSE PRACTITIONER

## 2020-09-10 PROCEDURE — 80053 COMPREHEN METABOLIC PANEL: CPT | Performed by: NURSE PRACTITIONER

## 2020-09-10 PROCEDURE — 93000 ELECTROCARDIOGRAM COMPLETE: CPT | Performed by: NURSE PRACTITIONER

## 2020-09-10 PROCEDURE — 36415 COLL VENOUS BLD VENIPUNCTURE: CPT | Performed by: NURSE PRACTITIONER

## 2020-09-10 RX ORDER — CEPHALEXIN 500 MG/1
500 CAPSULE ORAL 2 TIMES DAILY
Qty: 14 CAPSULE | Refills: 0 | Status: SHIPPED | OUTPATIENT
Start: 2020-09-10 | End: 2020-09-18

## 2020-09-10 ASSESSMENT — MIFFLIN-ST. JEOR: SCORE: 1793.09

## 2020-09-10 NOTE — TELEPHONE ENCOUNTER
"Per Dr. Mcgraw:     \"The redness is concerning. Doubtful it will be better with 3-4 days of antibiotics. Would be best to reschedule for at least a week out for the following Monday the 21st if she can do that instead. Can you check with unique/adam on rescheduling?\"    I forwarded to Unique Lagunas, surgery scheduler.  She will contact patient and work on getting her rescheduled.   "

## 2020-09-10 NOTE — PROGRESS NOTES
[facility]  [address]  [address]  [place] [address]  [phone]  Dept: [phone]    PRE-OP EVALUATION:  Today's date: 9/10/2020    Dora Barrera (: 1980) presents for pre-operative evaluation assessment as requested by Dr. Mcgraw.  She requires evaluation and anesthesia risk assessment prior to undergoing surgery/procedure for treatment of Excision right breast skin cyst.     Proposed Surgery/ Procedure: Excision right breast skin cyst.  Date of Surgery/ Procedure: 2020  Time of Surgery/ Procedure: 7:30 am   Hospital/Surgical Facility: [facility]   Surgery Fax Number: Note does not need to be faxed, will be available electronically in Epic.  Primary Physician: Jeff Contreras  Type of Anesthesia Anticipated: General    Preoperative Questionnaire:   No - Have you ever had a heart attack or stroke?  No - Have you ever had surgery on your heart or blood vessels, such as a stent, coronary (heart) bypass, or surgery on an artery in the head, neck, heart, or legs?  No - Do you have chest pain when you are physically active?  No - Do you have a history of heart failure?  No - Do you currently have a cold, bronchitis, or symptoms of other respiratory (head and chest) infections?  No - Do you have a cough, shortness of breath, or wheezing?  No - Do you or anyone in your family have a history of blood clots?  No - Do you or anyone in your family have a serious bleeding problem, such as long-lasting bleeding after surgeries or cuts?  No - Have you ever had anemia or been told to take iron pills?  No - Have you had any abnormal blood loss such as black, tarry or bloody stools, or abnormal vaginal bleeding?  No - Have you ever had a blood transfusion?  Yes - Are you willing to have a blood transfusion if it is medically needed before, during, or after your surgery?  No - Have you or anyone in your family ever had problems with anesthesia (sedation for  "surgery)?\",\"  No - Do you have sleep apnea, excessive snoring, or daytime drowsiness?  No - Do you have any artifical heart valves or other implanted medical devices, such as a pacemaker, defibrillator, or continuous glucose monitor?  No - Do you have any artifical joints?  No - Are you allergic to latex?  No - Is there any chance that you may be pregnant?    Patient has a Health Care Directive or Living Will:  NO    HPI:     HPI related to upcoming procedure: Right breast cyst around area where biopsy was performed 10-15 years ago.  Mammogram and ultrasound 8/2020 identified cyst.  General surgery consult 8/19/2020 suggested removal      See problem list for active medical problems.  Problems all longstanding and stable, except as noted/documented.  See ROS for pertinent symptoms related to these conditions.      MEDICAL HISTORY:     Patient Active Problem List    Diagnosis Date Noted     Gastroesophageal reflux disease without esophagitis 09/10/2020     Priority: Medium     Epidermal cyst 08/24/2020     Priority: Medium     Added automatically from request for surgery [id number]       Elevated fasting glucose 04/16/2019     Priority: Medium     Anxiety 04/12/2019     Priority: Medium     BMI > 40.0 (H) 04/12/2019     Priority: Medium     Benign essential hypertension 04/12/2019     Priority: Medium      Past Medical History:   Diagnosis Date     Anxiety      Gastroesophageal reflux disease     UGI:09/09/20, Re-evaluate in 2 months.     Hypertension      Status post endoscopy 09/08/2020    Mild GERD and low protein.     Past Surgical History:   Procedure Laterality Date     APPENDECTOMY      '97 Incidental with another procedure.     CHOLECYSTECTOMY      2009     GYN SURGERY      Rt. ovary removed.'94, L' Ov.cyst removed '97     HC ASPIRATION OF OVARIAN CYST Left      OOPHORECTOMY Right     Ovarian cyst, torsion     Current Outpatient Medications   Medication Sig Dispense Refill     cephALEXin (KEFLEX) 500 MG capsule " "Take 1 capsule (500 mg) by mouth 2 times daily 14 capsule 0     escitalopram (LEXAPRO) 20 MG tablet Take 1 tablet (20 mg) by mouth daily 90 tablet 0     lisinopril (ZESTRIL) 20 MG tablet Take 1 tablet (20 mg) by mouth daily 90 tablet 0     norgestimate-ethinyl estradiol (SPRINTEC 28) 0.25-35 MG-MCG tablet Take 1 tablet by mouth daily 84 tablet 1     omeprazole (PRILOSEC) 20 MG DR capsule Take 20 mg by mouth 2 times daily       OTC products: NSAIDS as needed.    No Known Allergies   Latex Allergy: NO    Social History     Tobacco Use     Smoking status: Never Smoker     Smokeless tobacco: Never Used   Substance Use Topics     Alcohol use: Yes     Comment: occ     History   Drug Use Unknown       REVIEW OF SYSTEMS:   CONSTITUTIONAL: NEGATIVE for fever, chills, change in weight  INTEGUMENTARY/SKIN: NEGATIVE for worrisome rashes, moles or lesions  EYES: NEGATIVE for vision changes or irritation  ENT/MOUTH: NEGATIVE for ear, mouth and throat problems  RESP: NEGATIVE for significant cough or SOB  BREAST: POSITIVE for NEGATIVE, erythema and nodule firm located right lower outer quadrant  CV: NEGATIVE for chest pain, palpitations or peripheral edema  GI: NEGATIVE for nausea, abdominal pain, heartburn, or change in bowel habits  : NEGATIVE for frequency, dysuria, or hematuria  MUSCULOSKELETAL: NEGATIVE for significant arthralgias or myalgia  NEURO: NEGATIVE for weakness, dizziness or paresthesias  ENDOCRINE: NEGATIVE for temperature intolerance, skin/hair changes  HEME: NEGATIVE for bleeding problems  PSYCHIATRIC: NEGATIVE for changes in mood or affect    EXAM:   /74 (BP Location: Right arm, Patient Position: Chair, Cuff Size: Adult Large)   Pulse 90   Temp 97.7  F (36.5  C) (Tympanic)   Resp 13   Ht 1.6 m (5' 3\")   Wt 114.9 kg (253 lb 4.8 oz)   LMP  (LMP Unknown)   SpO2 95%   BMI 44.87 kg/m         GENERAL APPEARANCE: healthy, alert and no distress     EYES: EOMI, PERRL     HENT: ear canals and TM's normal " and nose and mouth without ulcers or lesions     NECK: no adenopathy, no asymmetry, masses, or scars and thyroid normal to palpation     RESP: lungs clear to auscultation - no rales, rhonchi or wheezes     BREAST: erythema right breast [id number], mass right breast 1cm and tenderness right breast over mass..     CV: regular rates and rhythm, normal S1 S2, no S3 or S4 and no murmur, click or rub     ABDOMEN:  soft, nontender, no HSM or masses and bowel sounds normal     MS: extremities normal- no gross deformities noted, no evidence of inflammation in joints, FROM in all extremities.     SKIN: no suspicious lesions or rashes     NEURO: Normal strength and tone, sensory exam grossly normal, mentation intact and speech normal     PSYCH: mentation appears normal. and affect normal/bright     LYMPHATICS: No cervical adenopathy    DIAGNOSTICS:     EKG: appears normal, NSR, Normal Sinus Rhythm  Labs Drawn and in Process:   Unresulted Labs Ordered in the Past 30 Days of this Admission     Date and Time Order Name Status Description    9/10/2020 1341 COMPREHENSIVE METABOLIC PANEL In process           Recent Labs   Lab Test 08/03/20  1604 06/25/20  1055 06/25/20  1048 04/12/19  0752   INR 1.00  --   --   --    NA  --   --  140 139   POTASSIUM  --   --  3.7 4.1   CR  --   --  0.69 0.62   A1C  --  5.4  --   --         IMPRESSION:   Reason for surgery/procedure: Epidermal cyst  Diagnosis/reason for consult: safety to proceed with surgery     The proposed surgical procedure is considered LOW risk.    REVISED CARDIAC RISK INDEX  The patient has the following serious cardiovascular risks for perioperative complications such as (MI, PE, VFib and 3  AV Block):  No serious cardiac risks  INTERPRETATION: 0 risks: Class I (very low risk - 0.4% complication rate)    The patient has the following additional risks for perioperative complications:  Obesity      ICD-10-CM    1. Preop general physical exam  Z01.818 EKG 12-lead complete w/read -  Clinics     Comprehensive metabolic panel (BMP + Alb, Alk Phos, ALT, AST, Total. Bili, TP)   2. Epidermal cyst  L72.0 cephALEXin (KEFLEX) 500 MG capsule   3. Gastroesophageal reflux disease without esophagitis  K21.9    4. Cellulitis of other specified site  L03.818 cephALEXin (KEFLEX) 500 MG capsule   5. Benign essential hypertension  I10        RECOMMENDATIONS:     --Patient is to take all scheduled medications on the day of surgery EXCEPT for modifications listed below.    ACE Inhibitor or Angiotensin Receptor Blocker (ARB) Use  Ace inhibitor or Angiotensin Receptor Blocker (ARB) and should HOLD this medication for the 24 hours prior to surgery.    Surgery is NOT recommended due to cellulitis.  Patient is to take antibiotics and follow-up after completion.      Signed Electronically by: Keila Sheikh NP    Copy of this evaluation report is provided to requesting physician.    [facility] Preop Guidelines    Revised Cardiac Risk Index

## 2020-09-10 NOTE — TELEPHONE ENCOUNTER
Patient is scheduled for excision of R breast skin cyst on Monday, 9/14/20 with .    I spoke with patient over the phone.  She reports that NP believes she is starting with infection of the area again and prescribed one week of Keflex.    Picture of affected area sent by patient via [employer] and is attached.     She was told by NP to update our office and verify that she is ok to proceed with surgery on Monday.      I will forward to  for her input.

## 2020-09-10 NOTE — PATIENT INSTRUCTIONS

## 2020-09-11 LAB
ALBUMIN SERPL-MCNC: 2.9 G/DL (ref 3.4–5)
ALP SERPL-CCNC: 53 U/L (ref 40–150)
ALT SERPL W P-5'-P-CCNC: 23 U/L (ref 0–50)
ANION GAP SERPL CALCULATED.3IONS-SCNC: 8 MMOL/L (ref 3–14)
AST SERPL W P-5'-P-CCNC: 16 U/L (ref 0–45)
BILIRUB SERPL-MCNC: 0.3 MG/DL (ref 0.2–1.3)
BUN SERPL-MCNC: 9 MG/DL (ref 7–30)
CALCIUM SERPL-MCNC: 8.3 MG/DL (ref 8.5–10.1)
CHLORIDE SERPL-SCNC: 109 MMOL/L (ref 94–109)
CO2 SERPL-SCNC: 24 MMOL/L (ref 20–32)
CREAT SERPL-MCNC: 0.77 MG/DL (ref 0.52–1.04)
GFR SERPL CREATININE-BSD FRML MDRD: >90 ML/MIN/{1.73_M2}
GLUCOSE SERPL-MCNC: 89 MG/DL (ref 70–99)
POTASSIUM SERPL-SCNC: 4 MMOL/L (ref 3.4–5.3)
PROT SERPL-MCNC: 7.1 G/DL (ref 6.8–8.8)
SODIUM SERPL-SCNC: 141 MMOL/L (ref 133–144)

## 2020-09-16 ENCOUNTER — MYC MEDICAL ADVICE (OUTPATIENT)
Dept: PEDIATRICS | Facility: CLINIC | Age: 40
End: 2020-09-16

## 2020-09-16 DIAGNOSIS — L03.818 CELLULITIS OF OTHER SPECIFIED SITE: Primary | ICD-10-CM

## 2020-09-17 RX ORDER — SULFAMETHOXAZOLE/TRIMETHOPRIM 800-160 MG
1 TABLET ORAL 2 TIMES DAILY
Qty: 20 TABLET | Refills: 0 | Status: SHIPPED | OUTPATIENT
Start: 2020-09-17 | End: 2020-09-25

## 2020-09-17 NOTE — TELEPHONE ENCOUNTER
I put an antibiotic at the pharmacy for her to take in addition to her current Keflex prescription.    She should keep her f/u appointment on the 21st.    Thanks!

## 2020-09-17 NOTE — PROGRESS NOTES
[facility]  [address]  [address]  [place] [address]  [phone]  Dept: [phone]    PRE-OP EVALUATION:  Today's date: 2020    Dora Barrera (: 1980) presents for pre-operative evaluation assessment as requested by Fay Ricketts.  She requires evaluation and anesthesia risk assessment prior to undergoing surgery/procedure for treatment of Excision right breast skin cyst.     Proposed Surgery/ Procedure: Excision right breast skin cyst  Date of Surgery/ Procedure: 2020  Time of Surgery/ Procedure: 12:00 pm   Hospital/Surgical Facility: [facility]   Surgery Fax Number: Note does not need to be faxed, will be available electronically in Epic.  Primary Physician: Jeff Contreras  Type of Anesthesia Anticipated: General    Preoperative Questionnaire:   No - Have you ever had a heart attack or stroke?  No - Have you ever had surgery on your heart or blood vessels, such as a stent, coronary (heart) bypass, or surgery on an artery in the head, neck, heart, or legs?  No - Do you have chest pain when you are physically active?  No - Do you have a history of heart failure?  No - Do you currently have a cold, bronchitis, or symptoms of other respiratory (head and chest) infections?  No - Do you have a cough, shortness of breath, or wheezing?  No - Do you or anyone in your family have a history of blood clots?  No - Do you or anyone in your family have a serious bleeding problem, such as long-lasting bleeding after surgeries or cuts?  No - Have you ever had anemia or been told to take iron pills?  No - Have you had any abnormal blood loss such as black, tarry or bloody stools, or abnormal vaginal bleeding?  No - Have you ever had a blood transfusion?  Yes - Are you willing to have a blood transfusion if it is medically needed before, during, or after your surgery?  No - Have you or anyone in your family ever had problems with anesthesia  (sedation for surgery)?  No - Do you have sleep apnea, excessive snoring, or daytime drowsiness?   No - Do you have any artifical heart valves or other implanted medical devices, such as a pacemaker, defibrillator, or continuous glucose monitor?  No - Do you have any artifical joints?  No - Are you allergic to latex?  No - Is there any chance that you may be pregnant?    Patient has a Health Care Directive or Living Will:  NO    HPI:     HPI related to upcoming procedure: ***      {Ch. Problems:906323}    MEDICAL HISTORY:     Patient Active Problem List    Diagnosis Date Noted     Gastroesophageal reflux disease without esophagitis 09/10/2020     Priority: Medium     Epidermal cyst 08/24/2020     Priority: Medium     Added automatically from request for surgery [id number]       Elevated fasting glucose 04/16/2019     Priority: Medium     Anxiety 04/12/2019     Priority: Medium     BMI > 40.0 (H) 04/12/2019     Priority: Medium     Benign essential hypertension 04/12/2019     Priority: Medium      Past Medical History:   Diagnosis Date     Anxiety      Gastroesophageal reflux disease     UGI:09/09/20, Re-evaluate in 2 months.     Hypertension      Status post endoscopy 09/08/2020    Mild GERD and low protein.     Past Surgical History:   Procedure Laterality Date     APPENDECTOMY      '97 Incidental with another procedure.     CHOLECYSTECTOMY      2009     GYN SURGERY      Rt. ovary removed.'94, L' Ov.cyst removed '97     HC ASPIRATION OF OVARIAN CYST Left      OOPHORECTOMY Right     Ovarian cyst, torsion     Current Outpatient Medications   Medication Sig Dispense Refill     cephALEXin (KEFLEX) 500 MG capsule Take 1 capsule (500 mg) by mouth 2 times daily 14 capsule 0     escitalopram (LEXAPRO) 20 MG tablet Take 1 tablet (20 mg) by mouth daily 90 tablet 0     lisinopril (ZESTRIL) 20 MG tablet Take 1 tablet (20 mg) by mouth daily 90 tablet 0     norgestimate-ethinyl estradiol (SPRINTEC 28) 0.25-35 MG-MCG tablet Take 1  "tablet by mouth daily 84 tablet 1     omeprazole (PRILOSEC) 20 MG DR capsule Take 20 mg by mouth 2 times daily       sulfamethoxazole-trimethoprim (BACTRIM DS) 800-160 MG tablet Take 1 tablet by mouth 2 times daily 20 tablet 0     OTC products: {OTC ANALGESICS:050805}    No Known Allergies   Latex Allergy: {YES/NO WITH DEFAULT:343799::\"NO\"}    Social History     Tobacco Use     Smoking status: Never Smoker     Smokeless tobacco: Never Used   Substance Use Topics     Alcohol use: Yes     Comment: occ     History   Drug Use Unknown       REVIEW OF SYSTEMS:   {ROS Preop Choices:838299}    EXAM:   LMP  (LMP Unknown)   {EXAM Preop Choices:791544}    DIAGNOSTICS:   {DIAGNOSTIC FOR PREOP:129801}    Recent Labs   Lab Test 09/10/20  1354 08/03/20  1604 06/25/20  1055 06/25/20  1048   INR  --  1.00  --   --      --   --  140   POTASSIUM 4.0  --   --  3.7   CR 0.77  --   --  0.69   A1C  --   --  5.4  --         IMPRESSION:   {PREOP REASONS:382167::\"Reason for surgery/procedure: ***\",\"Diagnosis/reason for consult: ***\"}    The proposed surgical procedure is considered {HIGH=major cardiovascular or procedures requiring prolonged anesthesia >4 hours or large fluid shifts;    INTERMEDIATE=abdominal, most orthopedic and intrathoracic surgery; LOW= endoscopy, cataract and breast surgery:345795} risk.    REVISED CARDIAC RISK INDEX  The patient has the following serious cardiovascular risks for perioperative complications such as (MI, PE, VFib and 3  AV Block):  {PREOP REVISED CARDIAC INDEX (RCI):096763:p:\"No serious cardiac risks\"}  INTERPRETATION: {REVISED CARDIAC RISK INTERPRETATION:332841}    The patient has the following additional risks for perioperative complications:  {Additional perioperative risks:919681:p:\"No identified additional risks\"}      ICD-10-CM    1. Preop general physical exam  Z01.818        RECOMMENDATIONS:     {IMPORTANT - Conditions - complete carefully!!:144395}    {IMPORTANT - " "Medications:667808::\"--Patient is to take all scheduled medications on the day of surgery EXCEPT for modifications listed below.\"}    {IMPORTANT - Approval:048819:p:\"APPROVAL GIVEN to proceed with proposed procedure, without further diagnostic evaluation\"}       Signed Electronically by: Keila Sheikh NP    Copy of this evaluation report is provided to requesting physician.    [facility] Preop Guidelines    Revised Cardiac Risk Index  "

## 2020-09-17 NOTE — PROGRESS NOTES
[address]  [address]  [facility] [address]  Phone: [phone]  Fax: [phone]  Primary Provider: Jeff Contreras  Pre-op Performing Provider: KATLIN GALVEZ    PREOPERATIVE EVALUATION:  Today's date: 9/18/2020    Dora Barrera is a 40 year old female who presents for a preoperative evaluation.    Surgical Information:  Surgery Details 9/9/2020   Surgery/Procedure: excision right breast skin cyst   Surgery Location: [facility]   Surgeon: Dr. Mcgraw   Surgery Date: 9/14/2020   Time of Surgery: 7:30am   Where patient plans to recover: At home with family     Fax number for surgical facility: Note does not need to be faxed, will be available electronically in Epic.  Type of Anesthesia Anticipated: General    Subjective     HPI related to upcoming procedure: Excision of right breast skin cyst   Preop Questions 9/9/2020   1. Have you ever had a heart attack or stroke? No   2. Have you ever had surgery on your heart or blood vessels, such as a stent placement, a coronary artery bypass, or surgery on an artery in your head, neck, heart, or legs? No   3. Do you have chest pain with activity? No   4. Do you have a history of  heart failure? No   5. Do you currently have a cold, bronchitis or symptoms of other infection? No   6. Do you have a cough, shortness of breath, or wheezing? No   7. Do you or anyone in your family have previous history of blood clots? No   8. Do you or does anyone in your family have a serious bleeding problem such as prolonged bleeding following surgeries or cuts? No   9. Have you ever had problems with anemia or been told to take iron pills? No   10. Have you had any abnormal blood loss such as black, tarry or bloody stools, or abnormal vaginal bleeding? No   11. Have you ever had a blood transfusion? No   Are you willing to have a blood transfusion if it is medically needed before, during, or after your surgery? Yes   13. Have you  or any of your relatives ever had problems with anesthesia? No   14. Do you have sleep apnea, excessive snoring or daytime drowsiness? No   15. Do you have any artifical heart valves or other implanted medical devices like a pacemaker, defibrillator, or continuous glucose monitor? No   16. Do you have artificial joints? No   17. Are you allergic to latex? No   18. Is there any chance that you may be pregnant? No     Patient does not have a Health Care Directive or Living Will: Discussed advance care planning with patient; however, patient declined at this time.    RX monitoring program ([place]NPMP) reviewed: {Mnpmpreport:783787}  {Review MNPMP for all patients per [employer] [email or web address]}  {Chronic problem details (Optional):892888}    Review of Systems  {ROS Preop Choices:657708}    Patient Active Problem List    Diagnosis Date Noted     Gastroesophageal reflux disease without esophagitis 09/10/2020     Priority: Medium     Epidermal cyst 08/24/2020     Priority: Medium     Added automatically from request for surgery [id number]       Elevated fasting glucose 04/16/2019     Priority: Medium     Anxiety 04/12/2019     Priority: Medium     BMI > 40.0 (H) 04/12/2019     Priority: Medium     Benign essential hypertension 04/12/2019     Priority: Medium      Past Medical History:   Diagnosis Date     Anxiety      Gastroesophageal reflux disease     UGI:09/09/20, Re-evaluate in 2 months.     Hypertension      Status post endoscopy 09/08/2020    Mild GERD and low protein.     Past Surgical History:   Procedure Laterality Date     APPENDECTOMY      '97 Incidental with another procedure.     CHOLECYSTECTOMY      2009     GYN SURGERY      Rt. ovary removed.'94, L' Ov.cyst removed '97     HC ASPIRATION OF OVARIAN CYST Left      OOPHORECTOMY Right     Ovarian cyst, torsion     Current Outpatient Medications   Medication Sig Dispense Refill     cephALEXin (KEFLEX) 500 MG capsule Take 1 capsule (500 mg) by mouth  "2 times daily 14 capsule 0     escitalopram (LEXAPRO) 20 MG tablet Take 1 tablet (20 mg) by mouth daily 90 tablet 0     lisinopril (ZESTRIL) 20 MG tablet Take 1 tablet (20 mg) by mouth daily 90 tablet 0     norgestimate-ethinyl estradiol (SPRINTEC 28) 0.25-35 MG-MCG tablet Take 1 tablet by mouth daily 84 tablet 1     omeprazole (PRILOSEC) 20 MG DR capsule Take 20 mg by mouth 2 times daily       sulfamethoxazole-trimethoprim (BACTRIM DS) 800-160 MG tablet Take 1 tablet by mouth 2 times daily 20 tablet 0       No Known Allergies     Social History     Tobacco Use     Smoking status: Never Smoker     Smokeless tobacco: Never Used   Substance Use Topics     Alcohol use: Yes     Comment: occ     {FAMILY HISTORY (Optional):038448432}  History   Drug Use Unknown            Objective   LMP  (LMP Unknown)   Physical Exam  {EXAM Preop Choices:536619}    Recent Labs   Lab Test 09/10/20  1354 20  1604 20  1055 20  1048   INR  --  1.00  --   --      --   --  140   POTASSIUM 4.0  --   --  3.7   CR 0.77  --   --  0.69   A1C  --   --  5.4  --         PRE-OP Diagnostics:  {LABS:339328}  {EK}    {Provider  Link to PREOP SmartSet :954921}     Assessment & Plan   The proposed surgical procedure is considered {HIGH=major cardiovascular or procedures requiring prolonged anesthesia >4 hours or large fluid shifts;    INTERMEDIATE=abdominal, most orthopedic and intrathoracic surgery; LOW= endoscopy, cataract and breast surgery:139633} risk.    REVISED CARDIAC RISK INDEX  The patient has the following serious cardiovascular risks for perioperative complications:  {PREOP REVISED CARDIAC RISK INDEX (RCRI):424826::\"No serious cardiac risks = 0 points\"}    INTERPRETATION: {REVISED CARDIAC RISK INTERPRETATION:929030}    {Diag Picklist :086786}    The patient has the following additional risks and recommendations for perioperative complications:    {IMPORTANT - Conditions - complete carefully!!:919810}    POSSIBLE " "SLEEP APNEA: *** {Provider consider completing risk assessment  Link to STOP-Bang Flowsheet :405544}  {STOP-Bang Score and Risk Stratification (Optional):110544}     MEDICATION INSTRUCTIONS:  {IMPORTANT - Medications:475702}    RECOMMENDATION:  {IMPORTANT - Approval:239300::\"APPROVAL GIVEN to proceed with proposed procedure, without further diagnostic evaluation.\"}    No follow-ups on file.    Signed Electronically by: Keila Sheikh NP    Copy of this evaluation report is provided to requesting physician.    [facility] Guidelines    Revised Cardiac Risk Index  "

## 2020-09-17 NOTE — PATIENT INSTRUCTIONS

## 2020-09-18 ENCOUNTER — OFFICE VISIT (OUTPATIENT)
Dept: PEDIATRICS | Facility: CLINIC | Age: 40
End: 2020-09-18
Payer: COMMERCIAL

## 2020-09-18 ENCOUNTER — OFFICE VISIT (OUTPATIENT)
Dept: SURGERY | Facility: CLINIC | Age: 40
End: 2020-09-18
Payer: COMMERCIAL

## 2020-09-18 VITALS
RESPIRATION RATE: 15 BRPM | SYSTOLIC BLOOD PRESSURE: 134 MMHG | DIASTOLIC BLOOD PRESSURE: 82 MMHG | TEMPERATURE: 98.5 F | OXYGEN SATURATION: 97 % | BODY MASS INDEX: 45.5 KG/M2 | WEIGHT: 256.8 LBS | HEART RATE: 82 BPM | HEIGHT: 63 IN

## 2020-09-18 VITALS
OXYGEN SATURATION: 100 % | WEIGHT: 253 LBS | SYSTOLIC BLOOD PRESSURE: 128 MMHG | HEIGHT: 63 IN | DIASTOLIC BLOOD PRESSURE: 84 MMHG | BODY MASS INDEX: 44.83 KG/M2 | HEART RATE: 86 BPM | RESPIRATION RATE: 16 BRPM

## 2020-09-18 DIAGNOSIS — Z11.59 ENCOUNTER FOR SCREENING FOR OTHER VIRAL DISEASES: ICD-10-CM

## 2020-09-18 DIAGNOSIS — K21.9 GASTROESOPHAGEAL REFLUX DISEASE WITHOUT ESOPHAGITIS: ICD-10-CM

## 2020-09-18 DIAGNOSIS — I10 BENIGN ESSENTIAL HYPERTENSION: ICD-10-CM

## 2020-09-18 DIAGNOSIS — Z01.818 PREOP GENERAL PHYSICAL EXAM: Primary | ICD-10-CM

## 2020-09-18 DIAGNOSIS — L72.0 EPIDERMAL CYST: Primary | ICD-10-CM

## 2020-09-18 DIAGNOSIS — L72.0 EPIDERMAL CYST: ICD-10-CM

## 2020-09-18 PROCEDURE — 99213 OFFICE O/P EST LOW 20 MIN: CPT | Performed by: SURGERY

## 2020-09-18 PROCEDURE — 99214 OFFICE O/P EST MOD 30 MIN: CPT | Performed by: NURSE PRACTITIONER

## 2020-09-18 PROCEDURE — U0003 INFECTIOUS AGENT DETECTION BY NUCLEIC ACID (DNA OR RNA); SEVERE ACUTE RESPIRATORY SYNDROME CORONAVIRUS 2 (SARS-COV-2) (CORONAVIRUS DISEASE [COVID-19]), AMPLIFIED PROBE TECHNIQUE, MAKING USE OF HIGH THROUGHPUT TECHNOLOGIES AS DESCRIBED BY CMS-2020-01-R: HCPCS | Performed by: SURGERY

## 2020-09-18 ASSESSMENT — MIFFLIN-ST. JEOR
SCORE: 1791.73
SCORE: 1801.03

## 2020-09-18 NOTE — PROGRESS NOTES
"Established patient office visit    S: patient comes to clinic to have right breast cyst assessed prior to surgery in 3 days. I initially saw her 1 month ago when she had an infected right breast epidermal cyst. Had been improving on keflex at that time. She was scheduled for cyst excision on 9/14/20 however the week prior she developed recurrent infection of the cyst. We delayed surgery by 1 week to allow infection to recover. She had been on keflex again. About 5 days ago the cyst burst and had some bloody/clear drainage with surrounding skin redness. Since then it has been red around the site but drainage has stopped. She is now on bactrim.     /84   Pulse 86   Resp 16   Ht 1.6 m (5' 3\")   Wt 114.8 kg (253 lb)   LMP  (LMP Unknown)   SpO2 100%   BMI 44.82 kg/m    Appears well  Right breast 9 oclock - 0.5cm scab over cyst site with surrounding 3-4 cm of pale/purple reactive erythema. No fluctuance or swelling to suggest underlying fluid collection. Does not appear overtly cellulitic/angry red.    A/P  Right breast skin cyst. Recurrent infection, spontaneous discharge 5 days ago. Now appears more reactive surrounding erythema rather than ongoing cellulitis. No evidence of drainable fluid on exam today.    Will move forward with cyst excision as scheduled 9/21/20.   We discussed we could consider delaying surgery but I suspect she would just get a repeat infection  Continue bactrim until surgery  If appears infected at the time of surgery we discussed I would open and drain the site and may leave open with packing.  She is aware of risk for postop infection    Time spent of which more than 50% was counseling and coordinating care:  15 minutes.      Fay Mcgraw MD    "

## 2020-09-18 NOTE — LETTER
"2020    RE: Dora Barrera, : 1980      Established patient office visit     S: patient comes to clinic to have right breast cyst assessed prior to surgery in 3 days. I initially saw her 1 month ago when she had an infected right breast epidermal cyst. Had been improving on keflex at that time. She was scheduled for cyst excision on 20 however the week prior she developed recurrent infection of the cyst. We delayed surgery by 1 week to allow infection to recover. She had been on keflex again. About 5 days ago the cyst burst and had some bloody/clear drainage with surrounding skin redness. Since then it has been red around the site but drainage has stopped. She is now on bactrim.      /84   Pulse 86   Resp 16   Ht 1.6 m (5' 3\")   Wt 114.8 kg (253 lb)   LMP  (LMP Unknown)   SpO2 100%   BMI 44.82 kg/m    Appears well  Right breast 9 oclock - 0.5cm scab over cyst site with surrounding 3-4 cm of pale/purple reactive erythema. No fluctuance or swelling to suggest underlying fluid collection. Does not appear overtly cellulitic/angry red.     A/P  Right breast skin cyst. Recurrent infection, spontaneous discharge 5 days ago. Now appears more reactive surrounding erythema rather than ongoing cellulitis. No evidence of drainable fluid on exam today.     Will move forward with cyst excision as scheduled 20.   We discussed we could consider delaying surgery but I suspect she would just get a repeat infection  Continue bactrim until surgery  If appears infected at the time of surgery we discussed I would open and drain the site and may leave open with packing.  She is aware of risk for postop infection           Fay Mcgraw MD  "

## 2020-09-18 NOTE — PROGRESS NOTES
[address]  [address]  [facility] [address]  Phone: [phone]  Fax: [phone]  Primary Provider: Jeff Contreras  Pre-op Performing Provider: KATLIN GALVEZ    PREOPERATIVE EVALUATION:  Today's date: 9/18/2020    Dora Barrera is a 40 year old female who presents for a preoperative evaluation.    Surgical Information:  Surgery Details 9/18/2020   Surgery/Procedure: Excision Right Breast Skin Cyst   Surgery Location: [facility]   Surgeon: Dr. Mcgraw   Surgery Date: 9/21/2020   Time of Surgery: 12:00 pm   Where patient plans to recover: At home with family     Fax number for surgical facility: Note does not need to be faxed, will be available electronically in Epic.  Type of Anesthesia Anticipated: General    Subjective     HPI related to upcoming procedure: Excision Right Breast Skin Cyst   Preop Questions 9/18/2020   1. Have you ever had a heart attack or stroke? No   2. Have you ever had surgery on your heart or blood vessels, such as a stent placement, a coronary artery bypass, or surgery on an artery in your head, neck, heart, or legs? No   3. Do you have chest pain with activity? No   4. Do you have a history of  heart failure? No   5. Do you currently have a cold, bronchitis or symptoms of other infection? No   6. Do you have a cough, shortness of breath, or wheezing? No   7. Do you or anyone in your family have previous history of blood clots? No   8. Do you or does anyone in your family have a serious bleeding problem such as prolonged bleeding following surgeries or cuts? No   9. Have you ever had problems with anemia or been told to take iron pills? No   10. Have you had any abnormal blood loss such as black, tarry or bloody stools, or abnormal vaginal bleeding? No   11. Have you ever had a blood transfusion? No   Are you willing to have a blood transfusion if it is medically needed before, during, or after your surgery? Yes   13. Have  you or any of your relatives ever had problems with anesthesia? No   14. Do you have sleep apnea, excessive snoring or daytime drowsiness? No   15. Do you have any artifical heart valves or other implanted medical devices like a pacemaker, defibrillator, or continuous glucose monitor? No   16. Do you have artificial joints? No   17. Are you allergic to latex? No   18. Is there any chance that you may be pregnant? No     Patient does not have a Health Care Directive or Living Will: Discussed advance care planning with patient; however, patient declined at this time.  0956}    See problem list for active medical problems.  Problems all longstanding and stable, except as noted/documented.  See ROS for pertinent symptoms related to these conditions.      Review of Systems  CONSTITUTIONAL: NEGATIVE for fever, chills, change in weight  INTEGUMENTARY/SKIN: NEGATIVE for worrisome rashes, moles or lesions  EYES: NEGATIVE for vision changes or irritation  ENT/MOUTH: NEGATIVE for ear, mouth and throat problems  RESP: NEGATIVE for significant cough or SOB  BREAST: skin changes--slight erythema from healing cellulitis.  CV: NEGATIVE for chest pain, palpitations or peripheral edema  GI: NEGATIVE for nausea, abdominal pain, heartburn, or change in bowel habits  : NEGATIVE for frequency, dysuria, or hematuria  MUSCULOSKELETAL: NEGATIVE for significant arthralgias or myalgia  NEURO: NEGATIVE for weakness, dizziness or paresthesias  ENDOCRINE: NEGATIVE for temperature intolerance, skin/hair changes  HEME: NEGATIVE for bleeding problems  PSYCHIATRIC: NEGATIVE for changes in mood or affect    Patient Active Problem List    Diagnosis Date Noted     Gastroesophageal reflux disease without esophagitis 09/10/2020     Priority: Medium     Epidermal cyst 08/24/2020     Priority: Medium     Added automatically from request for surgery [id number]       Elevated fasting glucose 04/16/2019     Priority: Medium     Anxiety 04/12/2019     Priority:  "Medium     BMI > 40.0 (H) 04/12/2019     Priority: Medium     Benign essential hypertension 04/12/2019     Priority: Medium      Past Medical History:   Diagnosis Date     Anxiety      Gastroesophageal reflux disease     UGI:09/09/20, Re-evaluate in 2 months.     Hypertension      Status post endoscopy 09/08/2020    Mild GERD and low protein.     Past Surgical History:   Procedure Laterality Date     APPENDECTOMY      '97 Incidental with another procedure.     CHOLECYSTECTOMY      2009     GYN SURGERY      Rt. ovary removed.'94, L' Ov.cyst removed '97     HC ASPIRATION OF OVARIAN CYST Left      OOPHORECTOMY Right     Ovarian cyst, torsion     Current Outpatient Medications   Medication Sig Dispense Refill     escitalopram (LEXAPRO) 20 MG tablet Take 1 tablet (20 mg) by mouth daily 90 tablet 0     lisinopril (ZESTRIL) 20 MG tablet Take 1 tablet (20 mg) by mouth daily 90 tablet 0     norgestimate-ethinyl estradiol (SPRINTEC 28) 0.25-35 MG-MCG tablet Take 1 tablet by mouth daily 84 tablet 1     omeprazole (PRILOSEC) 20 MG DR capsule Take 20 mg by mouth 2 times daily       sulfamethoxazole-trimethoprim (BACTRIM DS) 800-160 MG tablet Take 1 tablet by mouth 2 times daily 20 tablet 0       No Known Allergies     Social History     Tobacco Use     Smoking status: Never Smoker     Smokeless tobacco: Never Used   Substance Use Topics     Alcohol use: Yes     Comment: occ     Family History   Problem Relation Age of Onset     Hypertension Mother      Cancer Mother 72        dx with kidney cancer - stage 4     Heart Disease Father      Diabetes Father      Hypertension Father      Colon Cancer No family hx of      Breast Cancer No family hx of        History   Drug Use Unknown            Objective   /82 (BP Location: Right arm, Patient Position: Chair, Cuff Size: Adult Large)   Pulse 82   Temp 98.5  F (36.9  C) (Tympanic)   Resp 15   Ht 1.588 m (5' 2.5\")   Wt 116.5 kg (256 lb 12.8 oz)   LMP  (LMP Unknown)   SpO2 " 97%   Breastfeeding No   BMI 46.22 kg/m       Physical Exam    GENERAL APPEARANCE: healthy, alert and no distress     EYES: EOMI, PERRL     HENT: ear canals and TM's normal and nose and mouth without ulcers or lesions     NECK: no adenopathy, no asymmetry, masses, or scars and thyroid normal to palpation     RESP: lungs clear to auscultation - no rales, rhonchi or wheezes     BREAST: 1cm right breast mass.  Slight erythema around mass. No tenderness or nipple discharge and no palpable axillary masses or adenopathy.     CV: regular rates and rhythm, normal S1 S2, no S3 or S4 and no murmur, click or rub     ABDOMEN:  soft, nontender, no HSM or masses and bowel sounds normal     MS: extremities normal- no gross deformities noted, no evidence of inflammation in joints, FROM in all extremities.     SKIN: no suspicious lesions or rashes     NEURO: Normal strength and tone, sensory exam grossly normal, mentation intact and speech normal     PSYCH: mentation appears normal. and affect normal/bright     LYMPHATICS: No cervical adenopathy    Recent Labs   Lab Test 09/10/20  1354 08/03/20  1604 06/25/20  1055 06/25/20  1048   INR  --  1.00  --   --      --   --  140   POTASSIUM 4.0  --   --  3.7   CR 0.77  --   --  0.69   A1C  --   --  5.4  --         PRE-OP Diagnostics:  No labs were ordered during this visit.  No EKG this visit, completed in the last 90 days.     Labs and EKG completed at 9/10/2020 pre-op visit.  EKG: NSR         Assessment & Plan   The proposed surgical procedure is considered LOW risk.    REVISED CARDIAC RISK INDEX  The patient has the following serious cardiovascular risks for perioperative complications:  No serious cardiac risks = 0 points    INTERPRETATION: 0 points: Class I (very low risk - 0.4% complication rate)       Problem List Items Addressed This Visit        Digestive    Gastroesophageal reflux disease without esophagitis       Circulatory    Benign essential hypertension        Musculoskeletal and Integumentary    Epidermal cyst      Other Visit Diagnoses     Preop general physical exam    -  Primary          The patient has the following additional risks and recommendations for perioperative complications:     - Morbid obesity (BMI >40)     MEDICATION INSTRUCTIONS:  Patient is to take all scheduled medications on the day of surgery EXCEPT for modifications listed below:     ACE Inhibitor or Angiotensin Receptor Blocker (ARB) Use  Ace inhibitor or Angiotensin Receptor Blocker (ARB) and should HOLD this medication for the 24 hours prior to surgery.    RECOMMENDATION:  APPROVAL GIVEN to proceed with proposed procedure, without further diagnostic evaluation.    Return if symptoms worsen or fail to improve.    Signed Electronically by: Keila Sheikh NP    Copy of this evaluation report is provided to requesting physician.    Preop [facility] Preop Guidelines    Revised Cardiac Risk Index

## 2020-09-19 LAB
LABORATORY COMMENT REPORT: NORMAL
SARS-COV-2 RNA SPEC QL NAA+PROBE: NEGATIVE
SARS-COV-2 RNA SPEC QL NAA+PROBE: NORMAL
SPECIMEN SOURCE: NORMAL
SPECIMEN SOURCE: NORMAL

## 2020-09-21 ENCOUNTER — ANESTHESIA EVENT (OUTPATIENT)
Dept: SURGERY | Facility: CLINIC | Age: 40
End: 2020-09-21
Payer: COMMERCIAL

## 2020-09-21 ENCOUNTER — APPOINTMENT (OUTPATIENT)
Dept: SURGERY | Facility: PHYSICIAN GROUP | Age: 40
End: 2020-09-21
Payer: COMMERCIAL

## 2020-09-21 ENCOUNTER — HOSPITAL ENCOUNTER (OUTPATIENT)
Facility: CLINIC | Age: 40
Discharge: HOME | End: 2020-09-21
Attending: SURGERY | Admitting: SURGERY
Payer: COMMERCIAL

## 2020-09-21 ENCOUNTER — ANESTHESIA (OUTPATIENT)
Dept: SURGERY | Facility: CLINIC | Age: 40
End: 2020-09-21
Payer: COMMERCIAL

## 2020-09-21 VITALS
RESPIRATION RATE: 12 BRPM | TEMPERATURE: 98 F | SYSTOLIC BLOOD PRESSURE: 127 MMHG | HEIGHT: 62 IN | HEART RATE: 78 BPM | WEIGHT: 253.4 LBS | DIASTOLIC BLOOD PRESSURE: 80 MMHG | OXYGEN SATURATION: 98 % | BODY MASS INDEX: 46.63 KG/M2

## 2020-09-21 DIAGNOSIS — L72.0 EPIDERMAL CYST: ICD-10-CM

## 2020-09-21 LAB — HCG UR QL: NEGATIVE

## 2020-09-21 PROCEDURE — 37000008 ZZH ANESTHESIA TECHNICAL FEE, 1ST 30 MIN: Performed by: SURGERY

## 2020-09-21 PROCEDURE — 40000306 ZZH STATISTIC PRE PROC ASSESS II: Performed by: SURGERY

## 2020-09-21 PROCEDURE — 25000125 ZZHC RX 250: Performed by: NURSE ANESTHETIST, CERTIFIED REGISTERED

## 2020-09-21 PROCEDURE — 81025 URINE PREGNANCY TEST: CPT | Performed by: ANESTHESIOLOGY

## 2020-09-21 PROCEDURE — 27210794 ZZH OR GENERAL SUPPLY STERILE: Performed by: SURGERY

## 2020-09-21 PROCEDURE — 88305 TISSUE EXAM BY PATHOLOGIST: CPT | Performed by: SURGERY

## 2020-09-21 PROCEDURE — 25000125 ZZHC RX 250: Performed by: SURGERY

## 2020-09-21 PROCEDURE — 37000009 ZZH ANESTHESIA TECHNICAL FEE, EACH ADDTL 15 MIN: Performed by: SURGERY

## 2020-09-21 PROCEDURE — 88305 TISSUE EXAM BY PATHOLOGIST: CPT | Mod: 26 | Performed by: SURGERY

## 2020-09-21 PROCEDURE — 36000050 ZZH SURGERY LEVEL 2 1ST 30 MIN: Performed by: SURGERY

## 2020-09-21 PROCEDURE — 25000128 H RX IP 250 OP 636: Performed by: NURSE ANESTHETIST, CERTIFIED REGISTERED

## 2020-09-21 PROCEDURE — 36000052 ZZH SURGERY LEVEL 2 EA 15 ADDTL MIN: Performed by: SURGERY

## 2020-09-21 PROCEDURE — 71000027 ZZH RECOVERY PHASE 2 EACH 15 MINS: Performed by: SURGERY

## 2020-09-21 PROCEDURE — 25800030 ZZH RX IP 258 OP 636: Performed by: ANESTHESIOLOGY

## 2020-09-21 PROCEDURE — 25000128 H RX IP 250 OP 636: Performed by: PHYSICIAN ASSISTANT

## 2020-09-21 RX ORDER — SODIUM CHLORIDE, SODIUM LACTATE, POTASSIUM CHLORIDE, CALCIUM CHLORIDE 600; 310; 30; 20 MG/100ML; MG/100ML; MG/100ML; MG/100ML
INJECTION, SOLUTION INTRAVENOUS CONTINUOUS
Status: DISCONTINUED | OUTPATIENT
Start: 2020-09-21 | End: 2020-09-21 | Stop reason: HOSPADM

## 2020-09-21 RX ORDER — PROPOFOL 10 MG/ML
INJECTION, EMULSION INTRAVENOUS CONTINUOUS PRN
Status: DISCONTINUED | OUTPATIENT
Start: 2020-09-21 | End: 2020-09-21

## 2020-09-21 RX ORDER — CEFAZOLIN SODIUM 2 G/100ML
2 INJECTION, SOLUTION INTRAVENOUS
Status: COMPLETED | OUTPATIENT
Start: 2020-09-21 | End: 2020-09-21

## 2020-09-21 RX ORDER — ACETAMINOPHEN 325 MG/1
650 TABLET ORAL
Status: DISCONTINUED | OUTPATIENT
Start: 2020-09-21 | End: 2020-09-21 | Stop reason: HOSPADM

## 2020-09-21 RX ORDER — LIDOCAINE 40 MG/G
CREAM TOPICAL
Status: DISCONTINUED | OUTPATIENT
Start: 2020-09-21 | End: 2020-09-21 | Stop reason: HOSPADM

## 2020-09-21 RX ORDER — FENTANYL CITRATE 50 UG/ML
25-50 INJECTION, SOLUTION INTRAMUSCULAR; INTRAVENOUS
Status: DISCONTINUED | OUTPATIENT
Start: 2020-09-21 | End: 2020-09-21 | Stop reason: HOSPADM

## 2020-09-21 RX ORDER — ONDANSETRON 2 MG/ML
INJECTION INTRAMUSCULAR; INTRAVENOUS PRN
Status: DISCONTINUED | OUTPATIENT
Start: 2020-09-21 | End: 2020-09-21

## 2020-09-21 RX ORDER — ONDANSETRON 2 MG/ML
4 INJECTION INTRAMUSCULAR; INTRAVENOUS EVERY 30 MIN PRN
Status: DISCONTINUED | OUTPATIENT
Start: 2020-09-21 | End: 2020-09-21 | Stop reason: HOSPADM

## 2020-09-21 RX ORDER — MEPERIDINE HYDROCHLORIDE 25 MG/ML
12.5 INJECTION INTRAMUSCULAR; INTRAVENOUS; SUBCUTANEOUS
Status: DISCONTINUED | OUTPATIENT
Start: 2020-09-21 | End: 2020-09-21 | Stop reason: HOSPADM

## 2020-09-21 RX ORDER — CEFAZOLIN SODIUM 1 G/3ML
1 INJECTION, POWDER, FOR SOLUTION INTRAMUSCULAR; INTRAVENOUS SEE ADMIN INSTRUCTIONS
Status: DISCONTINUED | OUTPATIENT
Start: 2020-09-21 | End: 2020-09-21 | Stop reason: HOSPADM

## 2020-09-21 RX ORDER — ONDANSETRON 4 MG/1
4 TABLET, ORALLY DISINTEGRATING ORAL EVERY 30 MIN PRN
Status: DISCONTINUED | OUTPATIENT
Start: 2020-09-21 | End: 2020-09-21 | Stop reason: HOSPADM

## 2020-09-21 RX ORDER — FENTANYL CITRATE 50 UG/ML
INJECTION, SOLUTION INTRAMUSCULAR; INTRAVENOUS PRN
Status: DISCONTINUED | OUTPATIENT
Start: 2020-09-21 | End: 2020-09-21

## 2020-09-21 RX ORDER — NALOXONE HYDROCHLORIDE 0.4 MG/ML
.1-.4 INJECTION, SOLUTION INTRAMUSCULAR; INTRAVENOUS; SUBCUTANEOUS
Status: DISCONTINUED | OUTPATIENT
Start: 2020-09-21 | End: 2020-09-21 | Stop reason: HOSPADM

## 2020-09-21 RX ORDER — PROPOFOL 10 MG/ML
INJECTION, EMULSION INTRAVENOUS PRN
Status: DISCONTINUED | OUTPATIENT
Start: 2020-09-21 | End: 2020-09-21

## 2020-09-21 RX ADMIN — MIDAZOLAM 2 MG: 1 INJECTION INTRAMUSCULAR; INTRAVENOUS at 12:00

## 2020-09-21 RX ADMIN — SODIUM CHLORIDE, POTASSIUM CHLORIDE, SODIUM LACTATE AND CALCIUM CHLORIDE: 600; 310; 30; 20 INJECTION, SOLUTION INTRAVENOUS at 11:51

## 2020-09-21 RX ADMIN — CEFAZOLIN SODIUM 2 G: 2 INJECTION, SOLUTION INTRAVENOUS at 11:52

## 2020-09-21 RX ADMIN — FENTANYL CITRATE 50 MCG: 50 INJECTION, SOLUTION INTRAMUSCULAR; INTRAVENOUS at 12:01

## 2020-09-21 RX ADMIN — ONDANSETRON HYDROCHLORIDE 4 MG: 2 INJECTION, SOLUTION INTRAVENOUS at 12:01

## 2020-09-21 RX ADMIN — PROPOFOL 125 MCG/KG/MIN: 10 INJECTION, EMULSION INTRAVENOUS at 11:59

## 2020-09-21 RX ADMIN — FENTANYL CITRATE 25 MCG: 50 INJECTION, SOLUTION INTRAMUSCULAR; INTRAVENOUS at 12:04

## 2020-09-21 RX ADMIN — PROPOFOL 75 MG: 10 INJECTION, EMULSION INTRAVENOUS at 11:56

## 2020-09-21 ASSESSMENT — MIFFLIN-ST. JEOR: SCORE: 1777.66

## 2020-09-21 ASSESSMENT — ENCOUNTER SYMPTOMS: DYSRHYTHMIAS: 0

## 2020-09-21 NOTE — ANESTHESIA POSTPROCEDURE EVALUATION
Patient: Dora Barrera    Procedure(s):  EXCISION RIGHT BREAST SKIN CYST    Diagnosis:Epidermal cyst [L72.0]  Diagnosis Additional Information: No value filed.    Anesthesia Type:  MAC    Note:  Anesthesia Post Evaluation    Patient location during evaluation: PACU  Patient participation: Able to fully participate in evaluation  Level of consciousness: awake  Pain management: adequate  Airway patency: patent  Cardiovascular status: acceptable  Respiratory status: acceptable  Hydration status: euvolemic  PONV: controlled     Anesthetic complications: None          Last vitals:  Vitals:    09/21/20 1226 09/21/20 1240 09/21/20 1325   BP: 128/79 125/87 127/80   Pulse:      Resp: 12 14 12   Temp:   98  F (36.7  C)   SpO2: 94%  98%         Electronically Signed By: Rip Doyle MD  September 21, 2020  2:10 PM

## 2020-09-21 NOTE — ANESTHESIA CARE TRANSFER NOTE
Patient: Dora Barrera    Procedure(s):  EXCISION RIGHT BREAST SKIN CYST    Diagnosis: Epidermal cyst [L72.0]  Diagnosis Additional Information: No value filed.    Anesthesia Type:   MAC     Note:  Airway :Room Air  Patient transferred to:Phase II  Handoff Report: Identifed the Patient, Identified the Reponsible Provider, Reviewed the pertinent medical history, Discussed the surgical course, Reviewed Intra-OP anesthesia mangement and issues during anesthesia, Set expectations for post-procedure period and Allowed opportunity for questions and acknowledgement of understanding      Vitals: (Last set prior to Anesthesia Care Transfer)    CRNA VITALS  9/21/2020 1154 - 9/21/2020 1230      9/21/2020             Pulse:  77    SpO2:  98 %                Electronically Signed By: MAURICE Herrera CRNA  September 21, 2020  12:30 PM

## 2020-09-21 NOTE — ANESTHESIA PREPROCEDURE EVALUATION
Anesthesia Pre-Procedure Evaluation    Patient: Dora Barrera   MRN: [id number] : 1980          Preoperative Diagnosis: Epidermal cyst [L72.0]    Procedure(s):  EXCISION RIGHT BREAST SKIN CYST    Past Medical History:   Diagnosis Date     Anxiety      Gastroesophageal reflux disease     UGI:20, Re-evaluate in 2 months.     Hypertension      Status post endoscopy 2020    Mild GERD and low protein.     Past Surgical History:   Procedure Laterality Date     APPENDECTOMY       Incidental with another procedure.     CHOLECYSTECTOMY           GYN SURGERY      Rt. ovary removed., L' Ov.cyst removed      HC ASPIRATION OF OVARIAN CYST Left      OOPHORECTOMY Right     Ovarian cyst, torsion     Anesthesia Evaluation     .             ROS/MED HX    ENT/Pulmonary:     (+)AUGIE risk factors obese, , . .   (-) asthma and sleep apnea   Neurologic:       Cardiovascular:     (+) hypertension----. : . . . :. .      (-) CAD, arrhythmias and pulmonary hypertension   METS/Exercise Tolerance:     Hematologic:        (-) anemia   Musculoskeletal:         GI/Hepatic:     (+) GERD       Renal/Genitourinary:      (-) renal disease   Endo:      (-) Type I DM, Type II DM, thyroid disease, chronic steroid usage, other endocrine disorder and obesity   Psychiatric:     (+) psychiatric history anxiety      Infectious Disease:         Malignancy:         Other:                          Physical Exam      Airway   Mallampati: II  TM distance: >3 FB  Neck ROM: full    Dental     Cardiovascular   Rhythm and rate: regular and normal  (-) no murmur    Pulmonary    breath sounds clear to auscultation    Other findings: Lab Test        20                       1604          INR          1.00           Lab Test        09/10/20     06/25/20     04/12/19                       1354          1048          0752          NA           141          140          139           POTASSIUM    4.0          3.7          4.1          "  CHLORIDE     109          112*         108           CO2          24           21           25            BUN          9            12           12            CR           0.77         0.69         0.62          ANIONGAP     8            7            6             SORAYA          8.3*         8.1*         8.5           GLC          89           87           100*                Lab Results   Component Value Date    CRP 40.0 (H) 08/03/2020    SED 23 (H) 08/03/2020     09/10/2020    POTASSIUM 4.0 09/10/2020    CHLORIDE 109 09/10/2020    CO2 24 09/10/2020    BUN 9 09/10/2020    CR 0.77 09/10/2020    GLC 89 09/10/2020    SORAYA 8.3 (L) 09/10/2020    ALBUMIN 2.9 (L) 09/10/2020    PROTTOTAL 7.1 09/10/2020    ALT 23 09/10/2020    AST 16 09/10/2020    ALKPHOS 53 09/10/2020    BILITOTAL 0.3 09/10/2020    INR 1.00 08/03/2020    HCG Negative 09/21/2020       Preop Vitals  BP Readings from Last 3 Encounters:   09/21/20 (!) 133/94   09/18/20 134/82   09/18/20 128/84    Pulse Readings from Last 3 Encounters:   09/21/20 78   09/18/20 82   09/18/20 86      Resp Readings from Last 3 Encounters:   09/21/20 16   09/18/20 15   09/18/20 16    SpO2 Readings from Last 3 Encounters:   09/21/20 97%   09/18/20 97%   09/18/20 100%      Temp Readings from Last 1 Encounters:   09/21/20 97.4  F (36.3  C) (Temporal)    Ht Readings from Last 1 Encounters:   09/21/20 1.575 m (5' 2\")      Wt Readings from Last 1 Encounters:   09/21/20 114.9 kg (253 lb 6.4 oz)    Estimated body mass index is 46.35 kg/m  as calculated from the following:    Height as of this encounter: 1.575 m (5' 2\").    Weight as of this encounter: 114.9 kg (253 lb 6.4 oz).       Anesthesia Plan      History & Physical Review  History and physical reviewed and following examination; no interval change.    ASA Status:  2 .    NPO Status:  > 8 hours    Plan for MAC with Propofol induction.   PONV prophylaxis:  Ondansetron (or other 5HT-3)         Postoperative Care  Postoperative " pain management:  IV analgesics and Oral pain medications.      Consents  Anesthetic plan, risks, benefits and alternatives discussed with:  Patient..                 Rip Doyle MD                    .

## 2020-09-21 NOTE — OP NOTE
General Surgery Operative Note      Pre-operative diagnosis: Right breast skin cyst   Post-operative diagnosis: same    Procedure: excision of right breast skin cyst 1cm   Surgeon: Fay Mcgraw MD   Assistant(s): NONE   Anesthesia: Local with MAC    Estimated blood loss: 1 cc     Specimens:  Findings:  ID Type Source Tests Collected by Time Destination   A : right breast skin cyst Tissue Breast, Right SURGICAL PATHOLOGY EXAM Fay Mcgraw MD 9/21/2020 12:12 PM      Small <1cm cyst in dermis at 9 oclock position 3cm from areola. Overlying skin excised as there was a scab overlying the cyst from prior drainage     The right breast was prepped and draped in standard sterile fashion.  The skin overlying the mass was anesthetized with local anesthetic.  An elliptical incision was made with a length of 2 cm.  The incision was carried into the subcutaneous tissue and the mass was completely excised from surrounding tissues using a knife down to normal appearing fat.  The cyst, which contained clear fluid and was < 1.0 cm in diameter was then passed off the field as specimen.  Hemostasis was maintained throughout with electrocautery.   The subcutaneous tissue was closed with an interrupted 3-0 Vicryl sutures.  The skin was closed with 4-0 Vicryl subcuticular suture and skin glue.  The patient tolerated the procedure well.  Sponge and needle counts were correct at the end of the case.     Fay Mcgraw MD

## 2020-09-21 NOTE — DISCHARGE INSTRUCTIONS
You may stop taking the Bactrim DS antibiotic and discard the medications.      HOME CARE FOLLOWING MINOR SURGERY  ANTONINO Joseph, VICKIE Sutton R. O Donnell, J. Shaheen    RESULTS:  If a biopsy of tissue was done, you may call for your final pathology report after 1p.m. two working days after surgery.  Otherwise, this will be reviewed with you at time of phone follow-up (described below).    INCISIONAL CARE:    If you have a dressing in place, keep clean and dry for 48 hours; you may replace the gauze if it becomes soiled.    After 48 hours you may remove the dressing and shower.  Do not submerse incision in water for 1 week.    If you have a Dermabond dressing (a type of skin glue), you may shower immediately.    Sutures which are beneath the skin will absorb and do not need to be removed.    Sutures you can see should be removed at your surgeon's office near 2 weeks postop, unless otherwise instructed.    If present, leave the steri-strips (white paper tapes) in place for 14 days after surgery.    If present, leave Dermabond glue in place until it wears/flakes off.    You may expect a small amount of drainage from your incision.    A lump/ridge under the incision is normal and will gradually resolve.  If it becomes red or very uncomfortable, contact the nurse at your surgeon's office to discuss whether this needs to be evaluated.    ACTIVITY:  Cautiously resume exercise and strenuous activities such as jogging, tennis, aerobics, etc. Also, be careful of stretching activities which affect the area of surgery for two weeks.    DIET:  Start with liquids and gradually resume your regular diet as tolerated.  Increased fluid intake is recommended. While taking pain medications, consider use of a stool softener, increase your fiber in your diet, or add a fiber supplement (like Metamucil, Citrucel) to help prevent constipation - a possible side effect of pain medications.    DISCOMFORT:  Local  anesthetic placed at surgery should provide relief for 4-8 hours.  Begin taking pain pills before discomfort is severe.  Take the pain medication with some food, when possible, to minimize side effects.  Intermittent use of ice packs may help during the first 1-3 weeks after surgery.  Expect gradual improvement.    Over-the-counter anti-inflammatory medications (i.e. Ibuprofen/Advil/Motrin or Naprosyn/Aleve) may be used per package instructions in addition to or while tapering off the narcotic pain medications to decrease swelling and sensitivity.  DO NOT TAKE these Anti-inflammatory medications if your primary physician has advised against doing so, or if you have acid reflux, ulcer, or bleeding disorder, or take blood-thinner medications.  Call your primary physician or the surgery office if you have medication questions.      FOLLOW-UP AFTER SURGERY:  -Our office will contact you approximately 2-3 weeks after surgery to check on your progress and answer any questions you may have.  If you are doing well, you will not need to return for an office appointment.  If any concerns are identified over the phone, we will help you make an appointment to see a provider.    -If you have not received a phone call, have any questions or concerns, or would like to be seen, please call us at [phone].  We are located at: [address], [address]; [place] [address]    -CONTACT US IF THE FOLLOWING DEVELOPS:   1. A fever that is above 101     2. Increased redness, warmth, drainage, bleeding, or swelling.   3. Pain that is not relieved by rest/ice and your prescription.   4.  Increasing pain after 48 hours.   5. Drainage that is thick, cloudy, yellow, green or white.   6. Any other questions or concerns.          GENERAL ANESTHESIA OR SEDATION ADULT DISCHARGE INSTRUCTIONS   SPECIAL PRECAUTIONS FOR 24 HOURS AFTER SURGERY    IT IS NOT UNUSUAL TO FEEL LIGHT-HEADED OR FAINT, UP TO 24 HOURS AFTER SURGERY OR WHILE TAKING PAIN  MEDICATION.  IF YOU HAVE THESE SYMPTOMS; SIT FOR A FEW MINUTES BEFORE STANDING AND HAVE SOMEONE ASSIST YOU WHEN YOU GET UP TO WALK OR USE THE BATHROOM.    YOU SHOULD REST AND RELAX FOR THE NEXT 24 HOURS AND YOU MUST MAKE ARRANGEMENTS TO HAVE SOMEONE STAY WITH YOU FOR AT LEAST 24 HOURS AFTER YOUR DISCHARGE.  AVOID HAZARDOUS AND STRENUOUS ACTIVITIES.  DO NOT MAKE IMPORTANT DECISIONS FOR 24 HOURS.    DO NOT DRIVE ANY VEHICLE OR OPERATE MECHANICAL EQUIPMENT FOR 24 HOURS FOLLOWING THE END OF YOUR SURGERY.  EVEN THOUGH YOU MAY FEEL NORMAL, YOUR REACTIONS MAY BE AFFECTED BY THE MEDICATION YOU HAVE RECEIVED.    DO NOT DRINK ALCOHOLIC BEVERAGES FOR 24 HOURS FOLLOWING YOUR SURGERY.    DRINK CLEAR LIQUIDS (APPLE JUICE, GINGER ALE, 7-UP, BROTH, ETC.).  PROGRESS TO YOUR REGULAR DIET AS YOU FEEL ABLE.    YOU MAY HAVE A DRY MOUTH, A SORE THROAT, MUSCLES ACHES OR TROUBLE SLEEPING.  THESE SHOULD GO AWAY AFTER 24 HOURS.    CALL YOUR DOCTOR FOR ANY OF THE FOLLOWING:  SIGNS OF INFECTION (FEVER, GROWING TENDERNESS AT THE SURGERY SITE, A LARGE AMOUNT OF DRAINAGE OR BLEEDING, SEVERE PAIN, FOUL-SMELLING DRAINAGE, REDNESS OR SWELLING.    IT HAS BEEN OVER 8 TO 10 HOURS SINCE SURGERY AND YOU ARE STILL NOT ABLE TO URINATE (PASS WATER).

## 2020-09-22 LAB — COPATH REPORT: NORMAL

## 2020-09-22 NOTE — PROGRESS NOTES
Pre-Visit Planning     Future Appointments   Date Time Provider Department Center   9/25/2020  2:15 PM Jeff Contreras MD [facility]     Arrival Time for this Appointment:  1:50 PM   Appointment Notes for this encounter:   Physical last px 4/2019    Questionnaires Reviewed/Assigned  No additional questionnaires are needed    Patient preferred phone number: [phone]    Spoke to patient via phone. Patient does not have additional questions or concerns.        Visit is preventive. Reviewed purpose of preventive visit with patient.     Patient will be getting Flu through work.     Patient is new to [address].    Patient reminded of date and time.   Denied barriers and any additional needs (transportation, financial, mobility).    Health Maintenance Due   Topic Date Due     ANNUAL REVIEW OF HM ORDERS  1980     PREVENTIVE CARE VISIT  04/12/2020     INFLUENZA VACCINE (1) 09/01/2020     Patient is due for:  preventive care visit  Appointment scheduled.    [employer]  Patient is active on [employer].    Questionnaire Review   None due.     Sugar Sinclair, EMT at 12:01 PM on September 22, 2020  Clinic Health Guide   [phone]

## 2020-09-22 NOTE — PROGRESS NOTES
"   SUBJECTIVE:   CC: Dora Barrera is an 40 year old woman who presents for preventive health visit.     {Split Bill scripting  The purpose of this visit is to discuss your medical history and prevent health problems before you are sick. You may be responsible for a co-pay, coinsurance, or deductible if your visit today includes services such as checking on a sore throat, having an x-ray or lab test, or treating and evaluating a new or existing condition :765074}  Patient has been advised of split billing requirements and indicates understanding: {Yes and No:529237}  Healthy Habits:    Do you get at least three servings of calcium containing foods daily (dairy, green leafy vegetables, etc.)? { :486539::\"yes\"}    Amount of exercise or daily activities, outside of work: { :742221}    Problems taking medications regularly { :976461::\"No\"}    Medication side effects: { :115653::\"No\"}    Have you had an eye exam in the past two years? { :101853}    Do you see a dentist twice per year? { :580231}    Do you have sleep apnea, excessive snoring or daytime drowsiness?{ :164114}  {Outside tests to abstract? :099694}    {additional problems to add (Optional):760969}    Today's PHQ-2 Score:   PHQ-2 ( 1999 [employer]) 8/6/2020 8/6/2020   Q1: Little interest or pleasure in doing things 1 1   Q2: Feeling down, depressed or hopeless 1 1   PHQ-2 Score 2 2   Q1: Little interest or pleasure in doing things Several days -   Q2: Feeling down, depressed or hopeless Several days -   PHQ-2 Score 2 -     {PHQ-2 LOOK IN ASSESSMENTS (Optional) :915994}  Abuse: Current or Past(Physical, Sexual or Emotional)- {YES/NO/NA:764718}  Do you feel safe in your environment? {YES/NO/NA:740899}        Social History     Tobacco Use     Smoking status: Never Smoker     Smokeless tobacco: Never Used   Substance Use Topics     Alcohol use: Yes     Comment: occ     If you drink alcohol do you typically have >3 drinks per day or >7 drinks per week? {ETOH " ":017933}                     Reviewed orders with patient.  Reviewed health maintenance and updated orders accordingly - {Yes/No:432248::\"Yes\"}  {Chronicprobdata (Optional):232771}    {Mammo Decision Support (Optional):220371}    Pertinent mammograms are reviewed under the imaging tab.  History of abnormal Pap smear: {PAP HX:064232}     Reviewed and updated as needed this visit by clinical staff         Reviewed and updated as needed this visit by Provider        {HISTORY OPTIONS (Optional):386665}    ROS:  { :105770}    OBJECTIVE:   LMP  (LMP Unknown)   EXAM:  {Exam Choices:040307}    {Diagnostic Test Results (Optional):179602::\"Diagnostic Test Results:\",\"Labs reviewed in Epic\"}    ASSESSMENT/PLAN:   {Diag Picklist:732999}    Patient has been advised of split billing requirements and indicates understanding: {YES / NO:730242::\"Yes\"}  COUNSELING:   {FEMALE COUNSELING MESSAGES:904467::\"Reviewed preventive health counseling, as reflected in patient instructions\"}    Estimated body mass index is 46.35 kg/m  as calculated from the following:    Height as of 9/21/20: 1.575 m (5' 2\").    Weight as of 9/21/20: 114.9 kg (253 lb 6.4 oz).    {Weight Management Plan (ACO) Complete if BMI is abnormal-  Ages 18-64  BMI >24.9.  Age 65+ with BMI <23 or >30 (Optional):728700}    She reports that she has never smoked. She has never used smokeless tobacco.      Counseling Resources:  ATP IV Guidelines  Pooled Cohorts Equation Calculator  Breast Cancer Risk Calculator  BRCA-Related Cancer Risk Assessment: FHS-7 Tool  FRAX Risk Assessment  ICSI Preventive Guidelines  Dietary Guidelines for Americans, 2010  USDA's MyPlate  ASA Prophylaxis  Lung CA Screening    Jeff Contreras MD  [facility]  "

## 2020-09-22 NOTE — PATIENT INSTRUCTIONS
Great to see you - let's try to give you a doctor break!    I would follow up with Sleep Medicine.     GI will send me records - I'm happy to take over the omeprazole script.     Can look into [employer] when you find a moment.     Akrinen Bloch is happy to see you back anytime.     Preventive Health Recommendations  Female Ages 26 - 39  Yearly exam:   See your health care provider every year in order to    Review health changes.     Discuss preventive care.      Review your medicines if you your doctor has prescribed any.    Until age 30: Get a Pap test every three years (more often if you have had an abnormal result).    After age 30: Talk to your doctor about whether you should have a Pap test every 3 years or have a Pap test with HPV screening every 5 years.   You do not need a Pap test if your uterus was removed (hysterectomy) and you have not had cancer.  You should be tested each year for STDs (sexually transmitted diseases), if you're at risk.   Talk to your provider about how often to have your cholesterol checked.  If you are at risk for diabetes, you should have a diabetes test (fasting glucose).  Shots: Get a flu shot each year. Get a tetanus shot every 10 years.   Nutrition:     Eat at least 5 servings of fruits and vegetables each day.    Eat whole-grain bread, whole-wheat pasta and brown rice instead of white grains and rice.    Get adequate Calcium and Vitamin D.     Lifestyle    Exercise at least 150 minutes a week (30 minutes a day, 5 days of the week). This will help you control your weight and prevent disease.    Limit alcohol to one drink per day.    No smoking.     Wear sunscreen to prevent skin cancer.    See your dentist every six months for an exam and cleaning.    Preventive Health Recommendations  Female Ages 26 - 39  Yearly exam:   See your health care provider every year in order to    Review health changes.     Discuss preventive care.      Review your medicines if you your doctor has  prescribed any.    Until age 30: Get a Pap test every three years (more often if you have had an abnormal result).    After age 30: Talk to your doctor about whether you should have a Pap test every 3 years or have a Pap test with HPV screening every 5 years.   You do not need a Pap test if your uterus was removed (hysterectomy) and you have not had cancer.  You should be tested each year for STDs (sexually transmitted diseases), if you're at risk.   Talk to your provider about how often to have your cholesterol checked.  If you are at risk for diabetes, you should have a diabetes test (fasting glucose).  Shots: Get a flu shot each year. Get a tetanus shot every 10 years.   Nutrition:     Eat at least 5 servings of fruits and vegetables each day.    Eat whole-grain bread, whole-wheat pasta and brown rice instead of white grains and rice.    Get adequate Calcium and Vitamin D.     Lifestyle    Exercise at least 150 minutes a week (30 minutes a day, 5 days of the week). This will help you control your weight and prevent disease.    Limit alcohol to one drink per day.    No smoking.     Wear sunscreen to prevent skin cancer.    See your dentist every six months for an exam and cleaning.

## 2020-09-25 ENCOUNTER — OFFICE VISIT (OUTPATIENT)
Dept: PEDIATRICS | Facility: CLINIC | Age: 40
End: 2020-09-25
Payer: COMMERCIAL

## 2020-09-25 VITALS
HEART RATE: 70 BPM | BODY MASS INDEX: 46.09 KG/M2 | TEMPERATURE: 96.9 F | DIASTOLIC BLOOD PRESSURE: 72 MMHG | WEIGHT: 252 LBS | RESPIRATION RATE: 16 BRPM | OXYGEN SATURATION: 97 % | SYSTOLIC BLOOD PRESSURE: 120 MMHG

## 2020-09-25 DIAGNOSIS — F41.9 ANXIETY: ICD-10-CM

## 2020-09-25 DIAGNOSIS — R73.01 ELEVATED FASTING GLUCOSE: ICD-10-CM

## 2020-09-25 DIAGNOSIS — K21.00 GASTROESOPHAGEAL REFLUX DISEASE WITH ESOPHAGITIS: ICD-10-CM

## 2020-09-25 DIAGNOSIS — Z80.0 FAMILY HISTORY OF COLON CANCER: ICD-10-CM

## 2020-09-25 DIAGNOSIS — E66.01 MORBID OBESITY (H): ICD-10-CM

## 2020-09-25 DIAGNOSIS — Z00.00 ROUTINE GENERAL MEDICAL EXAMINATION AT A HEALTH CARE FACILITY: Primary | ICD-10-CM

## 2020-09-25 DIAGNOSIS — Z30.41 ENCOUNTER FOR SURVEILLANCE OF CONTRACEPTIVE PILLS: ICD-10-CM

## 2020-09-25 DIAGNOSIS — I10 BENIGN ESSENTIAL HYPERTENSION: ICD-10-CM

## 2020-09-25 PROCEDURE — 90471 IMMUNIZATION ADMIN: CPT | Performed by: INTERNAL MEDICINE

## 2020-09-25 PROCEDURE — 99213 OFFICE O/P EST LOW 20 MIN: CPT | Mod: 25 | Performed by: INTERNAL MEDICINE

## 2020-09-25 PROCEDURE — 99395 PREV VISIT EST AGE 18-39: CPT | Mod: 25 | Performed by: INTERNAL MEDICINE

## 2020-09-25 PROCEDURE — 90686 IIV4 VACC NO PRSV 0.5 ML IM: CPT | Performed by: INTERNAL MEDICINE

## 2020-09-25 PROCEDURE — 96127 BRIEF EMOTIONAL/BEHAV ASSMT: CPT | Performed by: INTERNAL MEDICINE

## 2020-09-25 RX ORDER — ESCITALOPRAM OXALATE 20 MG/1
20 TABLET ORAL DAILY
Qty: 90 TABLET | Refills: 3 | Status: SHIPPED | OUTPATIENT
Start: 2020-09-25 | End: 2021-09-29

## 2020-09-25 RX ORDER — NORGESTIMATE AND ETHINYL ESTRADIOL 0.25-0.035
1 KIT ORAL DAILY
Qty: 84 TABLET | Refills: 4 | Status: SHIPPED | OUTPATIENT
Start: 2020-09-25 | End: 2021-06-09

## 2020-09-25 RX ORDER — LISINOPRIL 20 MG/1
20 TABLET ORAL DAILY
Qty: 90 TABLET | Refills: 3 | Status: SHIPPED | OUTPATIENT
Start: 2020-09-25 | End: 2021-04-01

## 2020-09-25 ASSESSMENT — ANXIETY QUESTIONNAIRES
1. FEELING NERVOUS, ANXIOUS, OR ON EDGE: SEVERAL DAYS
6. BECOMING EASILY ANNOYED OR IRRITABLE: SEVERAL DAYS
2. NOT BEING ABLE TO STOP OR CONTROL WORRYING: NOT AT ALL
7. FEELING AFRAID AS IF SOMETHING AWFUL MIGHT HAPPEN: SEVERAL DAYS
5. BEING SO RESTLESS THAT IT IS HARD TO SIT STILL: NOT AT ALL
IF YOU CHECKED OFF ANY PROBLEMS ON THIS QUESTIONNAIRE, HOW DIFFICULT HAVE THESE PROBLEMS MADE IT FOR YOU TO DO YOUR WORK, TAKE CARE OF THINGS AT HOME, OR GET ALONG WITH OTHER PEOPLE: SOMEWHAT DIFFICULT
3. WORRYING TOO MUCH ABOUT DIFFERENT THINGS: NOT AT ALL
GAD7 TOTAL SCORE: 4

## 2020-09-25 ASSESSMENT — PATIENT HEALTH QUESTIONNAIRE - PHQ9
SUM OF ALL RESPONSES TO PHQ QUESTIONS 1-9: 5
5. POOR APPETITE OR OVEREATING: SEVERAL DAYS

## 2020-09-25 NOTE — PROGRESS NOTES
SUBJECTIVE:   CC: Dora Barrera is an 40 year old woman who presents for preventive health visit.     Patient has been advised of split billing requirements and indicates understanding: Yes  Healthy Habits:    Do you get at least three servings of calcium containing foods daily (dairy, green leafy vegetables, etc.)? no    Amount of exercise or daily activities, outside of work: none    Problems taking medications regularly No    Medication side effects: No    Have you had an eye exam in the past two years? yes    Do you see a dentist twice per year? yes    Do you have sleep apnea, excessive snoring or daytime drowsiness?no    Today's PHQ-2 Score:   PHQ-2 ( 1999 [employer]) 9/25/2020 8/6/2020   Q1: Little interest or pleasure in doing things 0 1   Q2: Feeling down, depressed or hopeless 0 1   PHQ-2 Score 0 2   Q1: Little interest or pleasure in doing things - Several days   Q2: Feeling down, depressed or hopeless - Several days   PHQ-2 Score - 2     Abuse: Current or Past(Physical, Sexual or Emotional)- No  Do you feel safe in your environment? Yes    Social History     Tobacco Use     Smoking status: Never Smoker     Smokeless tobacco: Never Used   Substance Use Topics     Alcohol use: Yes     Comment: occ     If you drink alcohol do you typically have >3 drinks per day or >7 drinks per week? No                     Reviewed orders with patient.  Reviewed health maintenance and updated orders accordingly - Yes  Patient Active Problem List   Diagnosis     Anxiety     BMI > 40.0 (H)     Benign essential hypertension     Elevated fasting glucose     Epidermal cyst     Gastroesophageal reflux disease with esophagitis     Family history of colon cancer - start colonoscopies at age 46     Past Surgical History:   Procedure Laterality Date     APPENDECTOMY      '97 Incidental with another procedure.     CHOLECYSTECTOMY      2009     EXCISE MASS TRUNK Right 9/21/2020    Procedure: EXCISION RIGHT BREAST SKIN CYST;  Surgeon:  Fay Mcgraw MD;  Location: [facility] OR     GYN SURGERY      Rt. ovary removed.'94, L' Ov.cyst removed '97     HC ASPIRATION OF OVARIAN CYST Left      OOPHORECTOMY Right     Ovarian cyst, torsion       Social History     Tobacco Use     Smoking status: Never Smoker     Smokeless tobacco: Never Used   Substance Use Topics     Alcohol use: Yes     Comment: occ     Family History   Problem Relation Age of Onset     Hypertension Mother      Cancer Mother 72        dx with kidney cancer - stage 4     Heart Disease Father      Diabetes Father      Hypertension Father      Colon Cancer Maternal Grandfather         in his 70s     Breast Cancer No family hx of          Current Outpatient Medications   Medication Sig Dispense Refill     escitalopram (LEXAPRO) 20 MG tablet Take 1 tablet (20 mg) by mouth daily 90 tablet 3     lisinopril (ZESTRIL) 20 MG tablet Take 1 tablet (20 mg) by mouth daily 90 tablet 3     norgestimate-ethinyl estradiol (SPRINTEC 28) 0.25-35 MG-MCG tablet Take 1 tablet by mouth daily Continuously. 84 tablet 4     omeprazole (PRILOSEC) 20 MG DR capsule Take 20 mg by mouth 2 times daily       No Known Allergies    Mammogram not appropriate for this patient based on age.    Pertinent mammograms are reviewed under the imaging tab.  History of abnormal Pap smear: NO - age 30-65 PAP every 5 years with negative HPV co-testing recommended     Reviewed and updated as needed this visit by clinical staff  Allergies  Meds  [facility] H[facility]         Reviewed and updated as needed this visit by Provider  Meds  [facility]        Past Medical History:   Diagnosis Date     Anxiety      Gastroesophageal reflux disease     UGI:09/09/20, Re-evaluate in 2 months.     Hypertension      Status post endoscopy 09/08/2020    Mild GERD and low protein.      Past Surgical History:   Procedure Laterality Date     APPENDECTOMY      '97 Incidental with another procedure.     CHOLECYSTECTOMY      2009     EXCISE MASS TRUNK Right 9/21/2020     Procedure: EXCISION RIGHT BREAST SKIN CYST;  Surgeon: Fay Mcgraw MD;  Location: [facility] OR     GYN SURGERY      Rt. ovary removed.'94, L' Ov.cyst removed '97     HC ASPIRATION OF OVARIAN CYST Left      OOPHORECTOMY Right     Ovarian cyst, torsion        # Epidermoid cyst  - s/p excision 5 days ago    # GERD with esophagitis  - repeat upper endoscopy     # Hypoalbumin  - liver ultrasound normal    # HTN  BP Readings from Last 6 Encounters:   09/25/20 120/72   09/21/20 127/80   09/18/20 134/82   09/18/20 128/84   09/10/20 132/74   08/19/20 122/78     # Mental health  PHQ 4/12/2019 5/28/2020   PHQ-9 Total Score 4 2   Q9: Thoughts of better off dead/self-harm past 2 weeks Not at all Not at all     JAQUELINE-7 SCORE 4/12/2019 5/28/2020   Total Score 3 3       ROS:  CONSTITUTIONAL: NEGATIVE for fever, chills, change in weight  INTEGUMENTARU/SKIN: NEGATIVE for worrisome rashes, moles or lesions  EYES: NEGATIVE for vision changes or irritation  ENT: NEGATIVE for ear, mouth and throat problems  RESP: NEGATIVE for significant cough or SOB  BREAST: NEGATIVE for masses, tenderness or discharge  CV: NEGATIVE for chest pain, palpitations or peripheral edema  GI: NEGATIVE for nausea, abdominal pain, heartburn, or change in bowel habits  : NEGATIVE for unusual urinary or vaginal symptoms. Periods are regular.  MUSCULOSKELETAL: NEGATIVE for significant arthralgias or myalgia  NEURO: NEGATIVE for weakness, dizziness or paresthesias  PSYCHIATRIC: NEGATIVE for changes in mood or affect    OBJECTIVE:   /72   Pulse 70   Temp 96.9  F (36.1  C) (Tympanic)   Resp 16   Wt 114.3 kg (252 lb)   LMP  (LMP Unknown)   SpO2 97%   BMI 46.09 kg/m    EXAM:  GENERAL: healthy, alert and no distress  EYES: Eyes grossly normal to inspection, PERRL and conjunctivae and sclerae normal  HENT: ear canals and TM's normal, nose and mouth without ulcers or lesions  NECK: no adenopathy, no asymmetry, masses, or scars and thyroid normal to  palpation  RESP: lungs clear to auscultation - no rales, rhonchi or wheezes  BREAST: right breast with well healing incision site, no erythema or discharge, glue in place  CV: regular rate and rhythm, normal S1 S2, no S3 or S4, no murmur, click or rub, no peripheral edema and peripheral pulses strong  ABDOMEN: soft, nontender, no hepatosplenomegaly, no masses and bowel sounds normal  MS: no gross musculoskeletal defects noted, no edema  SKIN: no suspicious lesions or rashes  NEURO: Normal strength and tone, mentation intact and speech normal  PSYCH: mentation appears normal, affect normal/bright    Diagnostic Test Results:  Labs reviewed in Epic    ASSESSMENT/PLAN:   1. Routine general medical examination at a health care facility  - utd pap  - flu today  - colon at 46  - working with hospice for grief counseling for she and dad    2. BMI > 40.0 (H)  Has met with Lauren Bloch MTM in the past - not ready to do this again but interested.     3. Benign essential hypertension  BP Readings from Last 6 Encounters:   09/25/20 120/72   09/21/20 127/80   09/18/20 134/82   09/18/20 128/84   09/10/20 132/74   08/19/20 122/78     - lisinopril (ZESTRIL) 20 MG tablet; Take 1 tablet (20 mg) by mouth daily  Dispense: 90 tablet; Refill: 3  - OFFICE/OUTPT VISIT,EST,LEVL III    4. Elevated fasting glucose  Stable-  Monitor annually  - OFFICE/OUTPT VISIT,EST,LEVL III    5. Gastroesophageal reflux disease with esophagitis  Following with GI - on ppi 20mg twice daily  Has UGI scheduled for Nov  I'm okay taking over this script.    6. Anxiety  PHQ 4/12/2019 5/28/2020 9/25/2020   PHQ-9 Total Score 4 2 5   Q9: Thoughts of better off dead/self-harm past 2 weeks Not at all Not at all Not at all     JAQUELINE-7 SCORE 4/12/2019 5/28/2020 9/25/2020   Total Score 3 3 4     - escitalopram (LEXAPRO) 20 MG tablet; Take 1 tablet (20 mg) by mouth daily  Dispense: 90 tablet; Refill: 3  - OFFICE/OUTPT VISIT,EST,LEVL III    7. Encounter for surveillance of  "contraceptive pills  - norgestimate-ethinyl estradiol (SPRINTEC 28) 0.25-35 MG-MCG tablet; Take 1 tablet by mouth daily Continuously.  Dispense: 84 tablet; Refill: 4    8. Family history of colon cancer - start colonoscopies at age 46    COUNSELING:   Reviewed preventive health counseling, as reflected in patient instructions       Regular exercise       Healthy diet/nutrition       Immunizations       Contraception       Safe sex practices/STD prevention    Estimated body mass index is 46.09 kg/m  as calculated from the following:    Height as of 9/21/20: 1.575 m (5' 2\").    Weight as of this encounter: 114.3 kg (252 lb).    Weight management plan: Discussed healthy diet and exercise guidelines    She reports that she has never smoked. She has never used smokeless tobacco.      Counseling Resources:  ATP IV Guidelines  Pooled Cohorts Equation Calculator  Breast Cancer Risk Calculator  BRCA-Related Cancer Risk Assessment: FHS-7 Tool  FRAX Risk Assessment  ICSI Preventive Guidelines  Dietary Guidelines for Americans, 2010  USDA's MyPlate  ASA Prophylaxis  Lung CA Screening    Jeff Contreras MD  [facility]  "

## 2020-09-26 ASSESSMENT — ANXIETY QUESTIONNAIRES: GAD7 TOTAL SCORE: 4

## 2020-10-12 ENCOUNTER — MYC MEDICAL ADVICE (OUTPATIENT)
Dept: SURGERY | Facility: CLINIC | Age: 40
End: 2020-10-12

## 2020-10-12 NOTE — TELEPHONE ENCOUNTER
Attempted to call patient for post op check.  No answer.  Message was left for patient to call back if they had any questions of concerns. [employer] message was sent as well.    Keila Nazario PA-C

## 2021-03-05 ENCOUNTER — TRANSFERRED RECORDS (OUTPATIENT)
Dept: HEALTH INFORMATION MANAGEMENT | Facility: CLINIC | Age: 41
End: 2021-03-05

## 2021-03-09 ENCOUNTER — TRANSFERRED RECORDS (OUTPATIENT)
Dept: HEALTH INFORMATION MANAGEMENT | Facility: CLINIC | Age: 41
End: 2021-03-09

## 2021-03-09 LAB
ALT SERPL-CCNC: 24 U/L (ref 0–78)
AST SERPL-CCNC: 16 U/L (ref 0–37)
CREAT SERPL-MCNC: 0.64 MG/DL (ref 0.6–1.02)
GLUCOSE SERPL-MCNC: 70 MG/DL (ref 74–100)
POTASSIUM SERPL-SCNC: 4.5 MMOL/L (ref 3.5–5.1)

## 2021-03-10 ENCOUNTER — TRANSFERRED RECORDS (OUTPATIENT)
Dept: HEALTH INFORMATION MANAGEMENT | Facility: CLINIC | Age: 41
End: 2021-03-10

## 2021-03-17 ENCOUNTER — TRANSFERRED RECORDS (OUTPATIENT)
Dept: HEALTH INFORMATION MANAGEMENT | Facility: CLINIC | Age: 41
End: 2021-03-17

## 2021-03-18 ENCOUNTER — TRANSFERRED RECORDS (OUTPATIENT)
Dept: HEALTH INFORMATION MANAGEMENT | Facility: CLINIC | Age: 41
End: 2021-03-18

## 2021-03-31 ENCOUNTER — TRANSFERRED RECORDS (OUTPATIENT)
Dept: HEALTH INFORMATION MANAGEMENT | Facility: CLINIC | Age: 41
End: 2021-03-31

## 2021-04-01 ENCOUNTER — OFFICE VISIT (OUTPATIENT)
Dept: PEDIATRICS | Facility: CLINIC | Age: 41
End: 2021-04-01
Payer: COMMERCIAL

## 2021-04-01 VITALS
RESPIRATION RATE: 14 BRPM | HEIGHT: 63 IN | OXYGEN SATURATION: 96 % | SYSTOLIC BLOOD PRESSURE: 142 MMHG | DIASTOLIC BLOOD PRESSURE: 92 MMHG | WEIGHT: 272.5 LBS | HEART RATE: 92 BPM | TEMPERATURE: 98 F | BODY MASS INDEX: 48.28 KG/M2

## 2021-04-01 DIAGNOSIS — F41.9 ANXIETY: ICD-10-CM

## 2021-04-01 DIAGNOSIS — I10 BENIGN ESSENTIAL HYPERTENSION: Primary | ICD-10-CM

## 2021-04-01 DIAGNOSIS — E66.01 MORBID OBESITY (H): ICD-10-CM

## 2021-04-01 DIAGNOSIS — Z78.9 USES BIRTH CONTROL: ICD-10-CM

## 2021-04-01 PROCEDURE — 99214 OFFICE O/P EST MOD 30 MIN: CPT | Performed by: NURSE PRACTITIONER

## 2021-04-01 RX ORDER — LISINOPRIL 20 MG/1
30 TABLET ORAL DAILY
Qty: 90 TABLET | Refills: 0 | Status: SHIPPED | OUTPATIENT
Start: 2021-04-01 | End: 2021-06-09

## 2021-04-01 ASSESSMENT — MIFFLIN-ST. JEOR: SCORE: 1872.24

## 2021-04-01 NOTE — PATIENT INSTRUCTIONS
Increase the lisinopril to 30 mg.  Please send an update of your blood pressure readings over the next couple of weeks.    II think it would be a good idea to return to the weight clinic, referral placed.    Follow-up in 1 month on blood pressure/anxiety/weight

## 2021-04-09 ENCOUNTER — IMMUNIZATION (OUTPATIENT)
Dept: NURSING | Facility: CLINIC | Age: 41
End: 2021-04-09
Payer: COMMERCIAL

## 2021-04-09 PROCEDURE — 0001A PR COVID VAC PFIZER DIL RECON 30 MCG/0.3 ML IM: CPT

## 2021-04-09 PROCEDURE — 91300 PR COVID VAC PFIZER DIL RECON 30 MCG/0.3 ML IM: CPT

## 2021-04-22 ENCOUNTER — MYC MEDICAL ADVICE (OUTPATIENT)
Dept: PEDIATRICS | Facility: CLINIC | Age: 41
End: 2021-04-22

## 2021-04-22 ENCOUNTER — HOSPITAL ENCOUNTER (EMERGENCY)
Facility: CLINIC | Age: 41
Discharge: HOME | End: 2021-04-22
Attending: PHYSICIAN ASSISTANT | Admitting: PHYSICIAN ASSISTANT
Payer: COMMERCIAL

## 2021-04-22 ENCOUNTER — TRANSFERRED RECORDS (OUTPATIENT)
Dept: HEALTH INFORMATION MANAGEMENT | Facility: CLINIC | Age: 41
End: 2021-04-22

## 2021-04-22 ENCOUNTER — APPOINTMENT (OUTPATIENT)
Dept: ULTRASOUND IMAGING | Facility: CLINIC | Age: 41
End: 2021-04-22
Attending: PHYSICIAN ASSISTANT
Payer: COMMERCIAL

## 2021-04-22 VITALS
HEIGHT: 62 IN | OXYGEN SATURATION: 95 % | HEART RATE: 90 BPM | SYSTOLIC BLOOD PRESSURE: 167 MMHG | DIASTOLIC BLOOD PRESSURE: 105 MMHG | WEIGHT: 270 LBS | BODY MASS INDEX: 49.69 KG/M2 | TEMPERATURE: 97 F | RESPIRATION RATE: 20 BRPM

## 2021-04-22 DIAGNOSIS — I10 ESSENTIAL HYPERTENSION: ICD-10-CM

## 2021-04-22 DIAGNOSIS — J90 PLEURAL EFFUSION: ICD-10-CM

## 2021-04-22 DIAGNOSIS — R07.89 ATYPICAL CHEST PAIN: ICD-10-CM

## 2021-04-22 LAB
APPEARANCE FLD: NORMAL
B-HCG FREE SERPL-ACNC: <5 IU/L
COLOR FLD: NORMAL
EOSINOPHIL NFR FLD MANUAL: 9 %
GLUCOSE FLD-MCNC: 89 MG/DL
GRAM STN SPEC: NORMAL
INTERPRETATION ECG - MUSE: NORMAL
LABORATORY COMMENT REPORT: NORMAL
LACTATE BLD-SCNC: 0.7 MMOL/L (ref 0.7–2)
LDH FLD L TO P-CCNC: 273 U/L
LDH SERPL L TO P-CCNC: 208 U/L (ref 81–234)
LYMPHOCYTES NFR FLD MANUAL: 74 %
MONOS+MACROS NFR FLD MANUAL: 8 %
NEUTS BAND NFR FLD MANUAL: 7 %
OTHER CELLS FLD MANUAL: 2 %
PH FLD: 8 PH
PROT FLD-MCNC: 4.7 G/DL
PROT SERPL-MCNC: 7.3 G/DL (ref 6.8–8.8)
SARS-COV-2 RNA RESP QL NAA+PROBE: NEGATIVE
SPECIMEN SOURCE FLD: NORMAL
SPECIMEN SOURCE: NORMAL
SPECIMEN SOURCE: NORMAL
TRIGL FLD-MCNC: 72 MG/DL
WBC # FLD AUTO: 9314 /UL

## 2021-04-22 PROCEDURE — 93005 ELECTROCARDIOGRAM TRACING: CPT | Mod: 59

## 2021-04-22 PROCEDURE — 87635 SARS-COV-2 COVID-19 AMP PRB: CPT | Performed by: PHYSICIAN ASSISTANT

## 2021-04-22 PROCEDURE — 82945 GLUCOSE OTHER FLUID: CPT | Performed by: PHYSICIAN ASSISTANT

## 2021-04-22 PROCEDURE — 83615 LACTATE (LD) (LDH) ENZYME: CPT | Performed by: PHYSICIAN ASSISTANT

## 2021-04-22 PROCEDURE — 83605 ASSAY OF LACTIC ACID: CPT | Performed by: PHYSICIAN ASSISTANT

## 2021-04-22 PROCEDURE — C9803 HOPD COVID-19 SPEC COLLECT: HCPCS

## 2021-04-22 PROCEDURE — 84157 ASSAY OF PROTEIN OTHER: CPT | Performed by: PHYSICIAN ASSISTANT

## 2021-04-22 PROCEDURE — 87181 SC STD AGAR DILUTION PER AGT: CPT | Performed by: PHYSICIAN ASSISTANT

## 2021-04-22 PROCEDURE — 87205 SMEAR GRAM STAIN: CPT | Performed by: PHYSICIAN ASSISTANT

## 2021-04-22 PROCEDURE — 250N000013 HC RX MED GY IP 250 OP 250 PS 637: Performed by: PHYSICIAN ASSISTANT

## 2021-04-22 PROCEDURE — 272N000706 US THORACENTESIS

## 2021-04-22 PROCEDURE — 99285 EMERGENCY DEPT VISIT HI MDM: CPT | Mod: 25

## 2021-04-22 PROCEDURE — 84478 ASSAY OF TRIGLYCERIDES: CPT | Performed by: PHYSICIAN ASSISTANT

## 2021-04-22 PROCEDURE — 89051 BODY FLUID CELL COUNT: CPT | Performed by: PHYSICIAN ASSISTANT

## 2021-04-22 PROCEDURE — 87076 CULTURE ANAEROBE IDENT EACH: CPT | Performed by: PHYSICIAN ASSISTANT

## 2021-04-22 PROCEDURE — 87070 CULTURE OTHR SPECIMN AEROBIC: CPT | Performed by: PHYSICIAN ASSISTANT

## 2021-04-22 PROCEDURE — 84702 CHORIONIC GONADOTROPIN TEST: CPT

## 2021-04-22 PROCEDURE — 76705 ECHO EXAM OF ABDOMEN: CPT

## 2021-04-22 PROCEDURE — 250N000009 HC RX 250: Performed by: RADIOLOGY

## 2021-04-22 PROCEDURE — 84155 ASSAY OF PROTEIN SERUM: CPT | Performed by: PHYSICIAN ASSISTANT

## 2021-04-22 PROCEDURE — 83986 ASSAY PH BODY FLUID NOS: CPT | Performed by: PHYSICIAN ASSISTANT

## 2021-04-22 RX ORDER — LIDOCAINE HYDROCHLORIDE 10 MG/ML
10 INJECTION, SOLUTION EPIDURAL; INFILTRATION; INTRACAUDAL; PERINEURAL ONCE
Status: COMPLETED | OUTPATIENT
Start: 2021-04-22 | End: 2021-04-22

## 2021-04-22 RX ADMIN — LIDOCAINE HYDROCHLORIDE 10 ML: 10 INJECTION, SOLUTION EPIDURAL; INFILTRATION; INTRACAUDAL; PERINEURAL at 16:54

## 2021-04-22 RX ADMIN — AMOXICILLIN AND CLAVULANATE POTASSIUM 1 TABLET: 875; 125 TABLET, FILM COATED ORAL at 21:03

## 2021-04-22 ASSESSMENT — MIFFLIN-ST. JEOR: SCORE: 1852.96

## 2021-04-22 ASSESSMENT — ENCOUNTER SYMPTOMS
DIARRHEA: 1
SHORTNESS OF BREATH: 1
ABDOMINAL PAIN: 0
VOMITING: 1

## 2021-04-22 NOTE — ED TRIAGE NOTES
Pt arrives seen prior to ED visit at urgency room. She saw Dr. Romo who spoke with White pt sent for possible thoracentesis. Labs drawn PTA, pt on birth control having ongoing right sided chest pain large pleural effusion noted on imaging. BP elevated, A/o x 4.

## 2021-04-22 NOTE — PROCEDURES
[facility]    Procedure: Right thoracentesis.     Date/Time: 4/22/2021 5:07 PM  Performed by: Shadia Crespo DO  Authorized by: Shadia Crespo DO     UNIVERSAL PROTOCOL   Site Marked: Yes  Prior Images Obtained and Reviewed:  Yes  Required items: Required blood products, implants, devices and special equipment available    Patient identity confirmed:  Verbally with patient, arm band, provided demographic data and hospital-assigned identification number  Patient was reevaluated immediately before administering moderate or deep sedation or anesthesia  Confirmation Checklist:  Patient's identity using two indicators, relevant allergies, procedure was appropriate and matched the consent or emergent situation and correct equipment/implants were available  Time out: Immediately prior to the procedure a time out was called    Universal Protocol: the Joint Commission Universal Protocol was followed    Preparation: Patient was prepped and draped in usual sterile fashion           ANESTHESIA    Anesthesia: Local infiltration  Local Anesthetic:  Lidocaine 1% without epinephrine      SEDATION    Patient Sedated: No    See dictated procedure note for full details.  Findings: Right thoracentesis.     Specimens: none and fluid and/or tissue for laboratory analysis    Complications: None    Condition: Stable    PROCEDURE   Patient Tolerance:  Patient tolerated the procedure well with no immediate complications    Length of time physician/provider present for 1:1 monitoring during sedation: 0

## 2021-04-22 NOTE — ED PROVIDER NOTES
History   Chief Complaint:  Chest Pain     HPI   Dora Barrera is a 40 year old female with history of hypertension who presents with chest pain. The patient developed right sided chest pain 6 days ago. The chest pain is worse after taking a deep breath. She also reports feeling short of breath. She denies any abdominal pain. She does note having an episode of vomiting last week and has diarrhea which is common for her. She denies any recent falls or trauma. The patient is on birth control but has no history of blood clots. The patient went to the Urgency Room for evaluation earlier today and had workup done as noted below. She was told to come into the ED for a thoracentesis due to the right pleural effusion noted on CT.     Labs from the [facility]rgency Room 4/22/21  CBC:  WBC 13.2 (H), HGB 14.0,  (H), o/w WNL  Troponin(1010):  <0.019  CMP: globulin 3.7 (H), A/G ratio 1.1 (L), AST 55 (H), ALT 66 (H), o/w WNL (Creatinine: 0.60)  D dimer quantitative: 3.97 (H)  INR: 1.0 (L)    CT ABDOMEN PELVIS W  from the Urgency Room 4/22/21  1.  3 x 6.5 x 3.5 similar soft tissue thickening between the posterior right hemidiaphragm and the posterior capsule right hepatic lobe. Some central linear calcification and 1 cm rounded hypodensity. Differential considerations would favor inflammatory process such as a dropped gallstone with surrounding phlegmon. Infiltrating soft tissue mass would be felt further characterize. To be less likely given the patient's age. Ultrasound may be helpful to    2.  Moderate right pleural effusion likely related to subdiaphragmatic presumed inflammatory process. Diagnostic thoracentesis could be performed to assess for infection.     CTA CHEST from the [facility]rgency Room 4/22/21  1.  No acute pulmonary embolism or aortopathy.    2.  Moderate right pleural effusion and associated passive atelectasis of the right lung including multiple segments of the right lower lobe.    3.  Incompletely characterized  "process centered on the posterior liver which extends through the liver capsule and is inseparable from the right posterior costophrenic sulcus, likely related to #2. Further characterization with dedicated contrast-enhanced CT of the abdomen and pelvis is suggested.    4.  Enlarged right cardiophrenic mediastinal lymph nodes.   Reading per radiology     Review of Systems   Respiratory: Positive for shortness of breath.    Cardiovascular: Positive for chest pain (right).   Gastrointestinal: Positive for diarrhea and vomiting. Negative for abdominal pain.   All other systems reviewed and are negative.     Allergies:  No Known Allergies    Medications:  escitalopram   lisinopril   norgestimate-ethinyl estradiol   omeprazole    Past Medical History:    Anxiety   GERD  Hypertension     Past Surgical History:    Appendectomy   Cholecystectomy   Excise mass trunk   GYN surgery   Oophorectomy      Family History:    Hypertension   Cancer   Heart disease  Diabetes     Social History:  The patient presents alone.     Physical Exam     Patient Vitals for the past 24 hrs:   BP Temp Temp src Pulse Resp SpO2 Height Weight   04/22/21 1830 (!) 163/104 -- -- 83 -- 96 % -- --   04/22/21 1800 (!) 161/115 -- -- 94 -- 95 % -- --   04/22/21 1704 (!) 164/99 -- -- 89 -- 95 % -- --   04/22/21 1648 (!) 162/100 -- -- 92 -- 93 % -- --   04/22/21 1630 (!) 176/110 -- -- 103 -- 95 % -- --   04/22/21 1406 (!) 180/117 97  F (36.1  C) Temporal 104 20 97 % 1.575 m (5' 2\") 122.5 kg (270 lb)       Physical Exam  General: Alert and interactive. Appears well. Cooperative and pleasant.   Eyes: The pupils are equal and round. EOMs intact. No scleral icterus.  ENT: No abnormalities to the external nose or ears. Mucous membranes moist. Posterior oropharynx is non-erythematous.    Neck: Trachea is in the midline. No nuchal rigidity.     CV: Regular rate and rhythm. S1 and S2 normal without murmur, click, gallop or rub. Right lower chest tenderness to " palpation. No palpable mass.   Resp: Breath sounds are clear bilaterally, without rhonchi, wheezes, rales. Non-labored, no retractions or accessory muscle use.     GI: Abdomen is soft without distension. No tenderness to palpation. No peritoneal signs.    MS: Moving all extremities well. Good muscle tone.   Skin: Warm and dry. No rash or lesions noted.  Neuro: Alert and oriented x 3. No focal neurologic deficits. Good strength and sensation in upper and lower extremities.    Psych: Awake. Alert.  Normal affect. Appropriate interactions.  Lymph: No anterior or posterior cervical lymphadenopathy noted.    Emergency Department Course     ECG:  ECG taken at 1422, ECG read at 1430  Normal sinus rhythm  Normal ECG  Rate 100 bpm. RI interval 152 ms. QRS duration 80 ms. QT/QTc 362/466 ms. P-R-T axes 48 -8 9.     Imaging:    US Thoracentesis:  Technically successful thoracentesis without immediate  complications. Reading per radiology.     RUQ US: Pending    Laboratory:  Asymptomatic COVID-19 Virus (Coronavirus) by PCR Nasopharyngeal swab: pending      Lactic Acid whole blood (1622): 0.7      Lactate dehydrogenase: 208    Protein total: 7.3     ISTAT HCG quantitative pregnancy POCT: <5.0    Cell Count & Differential: Negative  Triglyceride Fluid - 72  Glucose Fluid: 89  Protein: 4.7  Gram Stain & Culture: Pending   PH Fluid - 8.0  Lactate Dehydrogenase Fluid - 273  Lactate Dehydrogenase - 208    Asymptomatic COVID19 Virus PCR by nasopharyngeal swab: Negative      Emergency Department Course:    Reviewed:  I reviewed the patient's nursing notes, vitals, past medical records, Care Everywhere.     Assessments:  1514  I performed an exam of the patient as documented above.   1617 Patient rechecked and updated.     Consults:   1603 I spoke with Dr. Crespo of the IR service regarding patient's presentation, findings, and plan of care.    1815 I spoke to Dr. Willis, general surgery.     Disposition:  Signed out to NANDO Haines,  pending US.      Impression & Plan   Covid-19  Dora Barrera was evaluated during a global COVID-19 pandemic, which necessitated consideration that the patient might be at risk for infection with the SARS-CoV-2 virus that causes COVID-19.   Applicable protocols for evaluation were followed during the patient's care.   COVID-19 was considered as part of the patient's evaluation. The plan for testing is:  a test was obtained during this visit.    Medical Decision Making:  Dora Barrera is a 40 year old female status post cholecystectomy in 2010 who presents for evaluation of right-sided chest pain. She had a thorough work-up performed at the urgency room, which revealed a abnormal fluid collection posterior to the liver and a moderate right-sided pleural effusion. Negative troponin and EKG shows no ischemic changes. Dimer elevated but CTA reveals no acute  PE. She was sent to thoracentesis today and her Light's criteria was positive, suggesting an exudative effusion. The patient herself is quite well-appearing without any hypoxia, tachycardia, and she is afebrile. She denies any recent weight loss, night sweats, cough, fevers, abdominal pain, vomiting. I discussed the case with Dr. Willis, general surgery, who suggested starting her on Augmentin for coverage of an intra-abdominal infection given the amount of white blood cells in her pleural fluid. He did not feel as though she required IV antibiotics or admission to the hospital. The patient herself much prefers to go home. He recommended that she be seen by their group as an outpatient for a possible laparoscopic exploration, as this abnormal fluid could be an inflammatory reaction to a dropped gallstone from the previous cholecystectomy. She may also benefit from a IR guided drain placement if there is truly a fluid collection or phlegmon present. He recommended RUQ US to obtain better pictures and assess for dropped gallstone or phlegmonous fluid  collection. Should this be a malignant process, general surgery would biopsy and then send her to appropriate follow up. I recommended that the patient see her PCP regardless of general surgery follow up to help with discharge planning. Vitals stable; stable for discharge home. Will sign out to Zaki Patel PA-C, as results are pending.     Diagnosis:    ICD-10-CM    1. Pleural effusion  J90 Asymptomatic SARS-CoV-2 COVID-19 Virus (Coronavirus) by PCR     Lactic acid whole blood     Cell count with differential fluid     Fluid Culture Aerobic Bacterial     Glucose fluid     Gram stain     Lactate dehydrogenase fluid     Lactate Dehydrogenase     Protein total     Protein fluid     ISTAT HCG Quantitative Pregnancy POCT     ISTAT HCG Quantitative Pregnancy POCT     Triglyceride Fluid     Triglyceride Fluid     pH fluid     pH fluid     CANCELED: pH fluid     CANCELED: Triglyceride Fluid   2. Atypical chest pain  R07.89    3. Essential hypertension  I10      Discharge Medications:  New Prescriptions    AMOXICILLIN-CLAVULANATE (AUGMENTIN) 875-125 MG TABLET    Take 1 tablet by mouth 2 times daily for 10 days     Scribe Disclosure:  I, Sea Little, am serving as a scribe at 3:14 PM on 4/22/2021 to document services personally performed by Zainab Metzger PA based on my observations and the provider's statements to me.        Zainab Metzger PA-C  04/22/21 1923       Zainab Metzger PA-C  04/23/21 0806

## 2021-04-22 NOTE — ED NOTES
Pt was in Radiology today for right thoracentesis. Procedure performed by  Dr Crespo. There were no complications during procedure and vitals remained stable throughout. Pt tolerated procedure well, 875  cc's of dark adam colored fluid was removed from right pleural space. Specimen to lab for testing. Pt then was given written and verbal instructions. Pt left department in stable and satisfactory condition with US technologist.

## 2021-04-23 ENCOUNTER — PATIENT OUTREACH (OUTPATIENT)
Dept: CARE COORDINATION | Facility: CLINIC | Age: 41
End: 2021-04-23

## 2021-04-23 ENCOUNTER — HOSPITAL ENCOUNTER (OUTPATIENT)
Dept: GENERAL RADIOLOGY | Facility: CLINIC | Age: 41
Discharge: HOME | End: 2021-04-23
Attending: RADIOLOGY | Admitting: RADIOLOGY
Payer: COMMERCIAL

## 2021-04-23 DIAGNOSIS — R07.9 CHEST PAIN: Primary | ICD-10-CM

## 2021-04-23 PROCEDURE — 71046 X-RAY EXAM CHEST 2 VIEWS: CPT

## 2021-04-23 NOTE — TELEPHONE ENCOUNTER
Received call from pt  She was in the ER yesterday and had thoracentesis  Unknown where the fluid is coming from   Had chest and abdomen CT  Follow up CXR today   She is having more SOB today and not feeling well  No fever  Covid negative    Pt has an appt on Monday with general surgery    Advised to pt that if she is more SOB today and is feeling worse she should be seen in the ER again today    Pt verbalized understanding and agrees to the plan    Thank you  Lester Teixeira RN on 4/23/2021 at 12:23 PM

## 2021-04-23 NOTE — DISCHARGE INSTRUCTIONS
Follow up with general surgery to discuss this phlegmon versus mass to biopsy or have a drain placed.   Use Ibuprofen/Tylenol for pain.   Take Augmentin to treat infection - this may cause diarrhea.

## 2021-04-23 NOTE — PROGRESS NOTES
Clinic Care Coordination Contact  [facility]/Voicemail  Left Voicemail     Clinical Data: Care Coordinator Outreach  Outreach attempted x 1.  Left message on patient's voicemail with call back information and requested return call.    Plan: Care Coordinator will try to reach patient again in 1-2 business days.    RODERICK Magallon, B.S. [facility] Care Coordination  [facility] Clinics:  [place], [place] and [place]  Phone: [phone]

## 2021-04-23 NOTE — LETTER
[facility] CARE COORDINATION  [facility]  April 26, 2021    Dora Barrera  [address] [place] [address]      Dear Dora,    I am a clinic community health worker who works with Jeff Contreras MD at the [facility]. I wanted to thank you for spending the time to talk with me.  Below is a description of clinic care coordination and how I can further assist you.      The clinic care coordination team is made up of a registered nurse,  and community health worker who understand the health care system. The goal of clinic care coordination is to help you manage your health and improve access to the health care system in the most efficient manner. The team can assist you in meeting your health care goals by providing education, coordinating services, strengthening the communication among your providers and supporting you with any resource needs.    Please feel free to contact me at [phone] with any questions or concerns. We are focused on providing you with the highest-quality healthcare experience possible and that all starts with you.     Sincerely,     Ana Maria Silveira, RODERICK, B.S. [facility]  Clinic Care Coordination  [facility]:  [place], [place] and [place]  Phone: [phone]

## 2021-04-26 ENCOUNTER — PATIENT OUTREACH (OUTPATIENT)
Dept: ONCOLOGY | Facility: CLINIC | Age: 41
End: 2021-04-26

## 2021-04-26 ENCOUNTER — TELEPHONE (OUTPATIENT)
Dept: EMERGENCY MEDICINE | Facility: CLINIC | Age: 41
End: 2021-04-26

## 2021-04-26 ENCOUNTER — APPOINTMENT (OUTPATIENT)
Dept: NURSING | Facility: CLINIC | Age: 41
End: 2021-04-26
Payer: COMMERCIAL

## 2021-04-26 DIAGNOSIS — J90 PLEURAL EFFUSION: Primary | ICD-10-CM

## 2021-04-26 NOTE — TELEPHONE ENCOUNTER
[facility] Emergency Department Lab result notification [Adult-Female]    [place] ED lab result protocol used  Culture     Reason for call  Notify of lab results, assess symptoms,  review ED providers recommendations/discharge instructions (if necessary) and advise per ED lab result f/u protocol    Lab Result (including Rx patient on, if applicable)  Preliminary FLUID culture report on 4/26/21 shows the presence of bacteria(s): Gram positive bacilli resembling diphtheroids  [facility] Emergency Dept discharge antibiotic prescribed [if applicable]: Amoxicillin-Clavulanate (Augmentin) 875-125 mg PO tablet, 1 tablet by mouth 2 times daily for 10 days  Recommendations in treatment per [facility] ED Lab Result Culture protocol  Information table from Emergency Dept Provider visit on 4/22/21  Symptoms reported at ED visit (Chief complaint, HPI) Chest Pain     HPI   Dora Barrera is a 40 year old female with history of hypertension who presents with chest pain. The patient developed right sided chest pain 6 days ago. The chest pain is worse after taking a deep breath. She also reports feeling short of breath. She denies any abdominal pain. She does note having an episode of vomiting last week and has diarrhea which is common for her. She denies any recent falls or trauma. The patient is on birth control but has no history of blood clots. The patient went [facility] for evaluation earlier today and had workup done as noted below. She was told to come into the ED for a thoracentesis due to the right pleural effusion noted on CT.    Significant Medical hx, if applicable (i.e. CKD, diabetes) Reviewed   Allergies No Known Allergies   Weight, if applicable Wt Readings from Last 2 Encounters:   04/22/21 122.5 kg (270 lb)   04/01/21 123.6 kg (272 lb 8 oz)      Coumadin/Warfarin [Yes /No] No   Creatinine Level (mg/dl) Creatinine   Date Value Ref Range Status   03/09/2021 0.64 0.60 - 1.02  mg/dl Final      Creatinine clearance (ml/min), if applicable Creatinine clearance cannot be calculated (Patient's most recent lab result is older than the maximum 30 days allowed.)   Pregnant (Yes/No/NA) No   Breastfeeding (Yes/No/NA) No   ED providers Impression and Plan (applicable information) Covid-19  Dora Barrera was evaluated during a global COVID-19 pandemic, which necessitated consideration that the patient might be at risk for infection with the SARS-CoV-2 virus that causes COVID-19.   Applicable protocols for evaluation were followed during the patient's care.   COVID-19 was considered as part of the patient's evaluation. The plan for testing is:  a test was obtained during this visit.     Medical Decision Making:  Dora Barrera is a 40 year old female status post cholecystectomy in 2010 who presents for evaluation of right-sided chest pain. She had a thorough work-up performed at the urgency room, which revealed a abnormal fluid collection posterior to the liver and a moderate right-sided pleural effusion. Negative troponin and EKG shows no ischemic changes. Dimer elevated but CTA reveals no acute  PE. She was sent to thoracentesis today and her Light's criteria was positive, suggesting an exudative effusion. The patient herself is quite well-appearing without any hypoxia, tachycardia, and she is afebrile. She denies any recent weight loss, night sweats, cough, fevers, abdominal pain, vomiting. I discussed the case with Dr. Willis, general surgery, who suggested starting her on Augmentin for coverage of an intra-abdominal infection given the amount of white blood cells in her pleural fluid. He did not feel as though she required IV antibiotics or admission to the hospital. The patient herself much prefers to go home. He recommended that she be seen by their group as an outpatient for a possible laparoscopic exploration, as this abnormal fluid could be an inflammatory reaction to a dropped  "gallstone from the previous cholecystectomy. She may also benefit from a IR guided drain placement if there is truly a fluid collection or phlegmon present. He recommended RUQ US to obtain better pictures and assess for dropped gallstone or phlegmonous fluid collection. Should this be a malignant process, general surgery would biopsy and then send her to appropriate follow up. I recommended that the patient see her PCP regardless of general surgery follow up to help with discharge planning. Vitals stable; stable for discharge home. Will sign out to Zaki Patel PA-C, as results are pending.    ED diagnosis 1. Pleural effusion  J90 Asymptomatic SARS-CoV-2 COVID-19 Virus (Coronavirus) by PCR       Lactic acid whole blood       Cell count with differential fluid       Fluid Culture Aerobic Bacterial       Glucose fluid       Gram stain       Lactate dehydrogenase fluid       Lactate Dehydrogenase       Protein total       Protein fluid       ISTAT HCG Quantitative Pregnancy POCT       ISTAT HCG Quantitative Pregnancy POCT       Triglyceride Fluid       Triglyceride Fluid       pH fluid       pH fluid       CANCELED: pH fluid       CANCELED: Triglyceride Fluid   2. Atypical chest pain  R07.89     3. Essential hypertension         ED provider Zainab Metzger PA-C      RN Assessment (Patient s current Symptoms), include time called.  [Insert Left message here if message left]  12:41PM: Spoke with patient. She states she is feeling better since Friday night. Is having \"mininal\" pain, \"I think it's just from being poked, its a tender\" pain. Shortness of breath has improved. \"I just wasn't breathing as deep before because of the pain.\" No fever.     RN Recommendations/Instructions per [place] ED lab result protocol  Patient notified of lab result and treatment recommendations.  Advised that she would be contacted with the final result.  Advised to continue taking her antibiotic as prescribed.  Advised to follow " up with surgery and the PCP as directed by the ED provider.  The  Patient states that surgery canceled her appointment for today and said she should follow up with a pulmonologist, she has scheduled this appointment. She knows to go to ED if she is feeling worse.  The patient is comfortable with the information given and has no further questions.      Please Contact your PCP clinic or return to the Emergency department if your:    Symptoms worsen or other concerning symptom's.    PCP follow-up Questions asked: YES       Niru Gaona RN  [facility]  Emergency Dept Lab Result RN  # [phone]     Copy of Lab result   Fluid Culture Aerobic Bacterial  Order: [id number]  Status:  Preliminary result   Visible to patient:  No (not released) Dx:  Pleural effusion  Specimen Information: Pleural fluid    RIGHT PLEURAL EFFUSION        Component 4d ago   Specimen Description Pleural fluid RIGHT PLEURAL EFFUSION    Culture Micro Abnormal   On day 4, isolated in broth only:   Gram positive bacilli resembling diphtheroids   P      Culture Micro Critical Value/Significant Value, preliminary result only, called to and read back by   Hamilton Vazquez RN at 1210 on 4/26/21 by JAZ ALMAZAN      [facility]         Specimen Collected: 04/22/21  4:35 PM Last Resulted: 04/26/21 12:11 PM

## 2021-04-26 NOTE — PROGRESS NOTES
Alicer was notified by Dr. Oh that general surgery was referring pt for pleural effusions. Dr. Oh is requesting that pt see Dr. Rojas with interventional pulmonology for follow up.     Writer called Dora and offered her an appt with Dr. Rojas on Monday May 3rd at 10:30 am. Pt accepted the in-person appointment but she also wants to check with Dr. Rojas's colleagues if there is an earlier appointment.     Writer will update intake and see if they are able to find an earlier appointment.     Pt verbalized understanding and is in agreement with the plan.     Melissa Lin, RN, BSN, BLADIMIR  RN Care Coordinator  [facility]  [phone]

## 2021-04-26 NOTE — PROGRESS NOTES
Clinic Care Coordination Contact  Community Health Worker Initial Outreach  Spoke with patient     Chart Review: Referral from discharge report. In ED on 4/22 for chest pain. Discharged to home, follow up with surgeon today and PCP on 4/26.     CHW introduced self and role with CC  Patient states that she was in the ED once, and then Thursday night the pain had come up in different ways, so she went back in.   Patient states that the pain has been better since Friday evening.   No outstanding questions or concerns at this time.   CHW inquired if patient is in need of any additional support or resources however patient declines at this time. She states that she will contact CHW in the future as they try to figure out what is going on.   CHW reviewed upcoming appointments, patient states that she is nervous about getting the 2nd COVID vaccine. Patient plans on asking providers about this.        Patient accepts CC: No, patient declines outstanding needs for CC support or connection to resources at this time. Patient will be sent Care Coordination introduction letter for future reference.     RODERICK Magallon, B.S. [facility] Care Coordination  [facility]:  [place], [place] [address] [place]  Phone: [phone]

## 2021-04-27 ENCOUNTER — PRE VISIT (OUTPATIENT)
Dept: PULMONOLOGY | Facility: CLINIC | Age: 41
End: 2021-04-27

## 2021-04-27 ENCOUNTER — VIRTUAL VISIT (OUTPATIENT)
Dept: PULMONOLOGY | Facility: CLINIC | Age: 41
End: 2021-04-27
Attending: THORACIC SURGERY (CARDIOTHORACIC VASCULAR SURGERY)
Payer: COMMERCIAL

## 2021-04-27 DIAGNOSIS — J90 PLEURAL EFFUSION: ICD-10-CM

## 2021-04-27 PROCEDURE — 999N001193 HC VIDEO/TELEPHONE VISIT; NO CHARGE

## 2021-04-27 PROCEDURE — 99204 OFFICE O/P NEW MOD 45 MIN: CPT | Mod: 95 | Performed by: INTERNAL MEDICINE

## 2021-04-27 NOTE — LETTER
"4/27/2021       RE: Dora Barrera  [address] [place] [address]     Dear Colleague,    Thank you for referring your patient, Dora Barrera, to [facility]. Please see a copy of my visit note below.    Dora is a 40 year old who is being evaluated via a billable video visit.      How would you like to obtain your AVS? MyChart  If the video visit is dropped, the invitation should be resent by: Text to cell phone: [phone]  Will anyone else be joining your video visit? No       Vitals - Patient Reported  Weight (Patient Reported): 122.5 kg (270 lb)  Height (Patient Reported): 157.5 cm (5' 2\")  BMI (Based on Pt Reported Ht/Wt): 49.38  Pain Score: Moderate Pain (5)  Pain Loc: Breast(right below breast on the right side.)    Shelly Solomon CMA on 4/27/2021 at 3:28 PM      Video Start Time: 4  Video-Visit Details  Per note    LUNG NODULE & INTERVENTIONAL PULMONARY CLINIC  CL[facility]ICS & SURGERY [facility], [facility]     Dora Barrera MRN# [id number]   Age: 40 year old YOB: 1980     Reason for Consultation: pleural effusion    Requesting Physician: Fran Oh MD  [address] [place] [address]       Assessment and Plan:    1. Recurrent right pleural effusion. Fluid analysis is exudative with lymphocyte predominance. There is no evidence of acute PE, infection, or cardiac/renal disease. She is anxious that no one is able to figure this out. I discussed with her that we could narrow the ddx with pleuroscopy+bx. She is agreeable and wants to do it this week. We will plan pleuroscopy+pleural biopsy then CT chest prior to discharge home.      Billing: I spent 30 minutes including face to face, chart review, personal and documented interpretation of results, counseling and coordination of care about the issues above.      Cory Ennis " MD   of Medicine  Interventional Pulmonology  Department of Pulmonary, Allergy, Critical Care and Sleep Medicine   [facility], [employer]  Pager: [phone]          History:     Dora Barrera is a 40 year old female with sig h/o for anxiety, GERD, HTN who is here for evaluation/followup of pleural effusion.    - No new resp sx or complaints. Denies dyspnea or cough.   - Had chest pain 4/16 following COVID (first dose pfizer vaccine on 4/9). Went to ED and found right pleural effusion. Had CT PE r/o 4/22 and did not show PE. Had thoracentesis and removed 1L. Fluid was red in color. Fluid analysis was EBC 9314 predominately lymphocytes. I could not find if cytology was sent. She did have progressive dyspnea. No fever/chills. On birth control.   - Personal hx of cancer: No  - Family hx of cancer: Mom had kidney cancer. Dad has prostate cancer.   - Exposure hx: Denies asbestos or radon exposure   - Tobacco hx: Never smoker   - My interpretation of the images relevant for this visit includes: right pleural fluid    - My interpretation of the PFT's relevant for this visit includes: None     Imaging  CXR with moderate-large pleural effusion    Other active medical problems include:   - Has HTN and GERD. Stable.          Past Medical History:      Past Medical History:   Diagnosis Date     Anxiety      Gastroesophageal reflux disease     UGI:09/09/20, Re-evaluate in 2 months.     Hypertension      Status post endoscopy 09/08/2020    Mild GERD and low protein.           Past Surgical History:      Past Surgical History:   Procedure Laterality Date     APPENDECTOMY      '97 Incidental with another procedure.     CHOLECYSTECTOMY      2009     EXCISE MASS TRUNK Right 9/21/2020    Procedure: EXCISION RIGHT BREAST SKIN CYST;  Surgeon: Fay Mcgraw MD;  Location: [facility] OR     GYN SURGERY      Rt. ovary removed.'94, L' Ov.cyst removed '97     HC ASPIRATION OF OVARIAN CYST Left       OOPHORECTOMY Right     Ovarian cyst, torsion          Social History:     Social History     Tobacco Use     Smoking status: Never Smoker     Smokeless tobacco: Never Used   Substance Use Topics     Alcohol use: Yes     Comment: occ          Family History:     Family History   Problem Relation Age of Onset     Hypertension Mother      Cancer Mother 72        dx with kidney cancer - stage 4     Heart Disease Father      Diabetes Father      Hypertension Father      Colon Cancer Maternal Grandfather         in his 70s     Breast Cancer No family hx of            Allergies:    No Known Allergies       Medications:     Current Outpatient Medications   Medication Sig     amoxicillin-clavulanate (AUGMENTIN) 875-125 MG tablet Take 1 tablet by mouth 2 times daily for 10 days     escitalopram (LEXAPRO) 20 MG tablet Take 1 tablet (20 mg) by mouth daily     lisinopril (ZESTRIL) 20 MG tablet Take 1.5 tablets (30 mg) by mouth daily     norgestimate-ethinyl estradiol (SPRINTEC 28) 0.25-35 MG-MCG tablet Take 1 tablet by mouth daily Continuously.     omeprazole (PRILOSEC) 20 MG DR capsule Take 20 mg by mouth 2 times daily     No current facility-administered medications for this visit.           Review of Systems:     CONSTITUTIONAL: negative for fever, chills, change in weight  INTEGUMENTARY/SKIN: no rash or obvious new lesions  ENT/MOUTH: no sore throat, new sinus pain or nasal drainage  RESP: see interval history  CV: negative for chest pain, palpitations or peripheral edema  GI: no nausea, vomiting, change in stools  : no dysuria  MUSCULOSKELETAL: no myalgias, arthralgias  ENDOCRINE: blood sugars with adequate control  PSYCHIATRIC: mood stable  LYMPHATIC: no new lymphadenopathy  HEME: no bleeding or easy bruisability  NEURO: no numbness, weakness, headaches         Physical Exam:     Constitutional - looks well, in no apparent distress  Eyes - no redness or discharge  Respiratory -breathing appears comfortable. No wheeze or  rhonchi.   Cardiac -- Normal rate, rhythm.   Skin - No appreciable discoloration or lesions (very limited exam)  Neurological - No apparent tremors. Speech fluent and articlate  Psychiatric - no signs of delirium or anxiety     Exam limited to that easily identified on a virtual visit. The rest of a comprehensive physical examination is deferred due to PHE (public health emergency) video visit restrictions.         Current Laboratory Data:   All laboratory and imaging data reviewed.    No results found for this or any previous visit (from the past 24 hour(s)).         Previous Pulmonary Function Testing   No results found for: 20001  No results found for: 20002  No results found for: 20003  No results found for: 20015  No results found for: 20016  No results found for: 20027  No results found for: 20028  No results found for: 20029  No results found for: 20079  No results found for: 20080  No results found for: 20081  No results found for: 20335  No results found for: 20105  No results found for: 20053  No results found for: 20054  No results found for: 20055         Previous Chest Imaging   No images are attached to the encounter.  No images are attached to the encounter or orders placed in the encounter.         Previous Cardiology Imaging   No results found for this or any previous visit (from the past 8760 hour(s)).                 Again, thank you for allowing me to participate in the care of your patient.      Sincerely,    Cory Ennis MD

## 2021-04-27 NOTE — TELEPHONE ENCOUNTER
RECORDS STATUS - ALL OTHER DIAGNOSIS      RECORDS RECEIVED FROM: [facility]/ [facility]    DATE RECEIVED: 4/27/2021   NOTES STATUS DETAILS   OFFICE NOTE from referring provider     OFFICE NOTE from medical oncologist N/A    DISCHARGE SUMMARY from hospital N/A    DISCHARGE REPORT from the ER Complete 4/22/2021 Pleural Effusion, Atypical chest pain    OPERATIVE REPORT Complete 4/22/2021 Right Thoracentesis     [facility] is faxing over an EKG Report   MEDICATION LIST Complete [facility]   CLINICAL TRIAL TREATMENTS TO DATE     LABS     PATHOLOGY REPORTS N/A    ANYTHING RELATED TO DIAGNOSIS Complete Labs last updated on 4/22/2021    GENONOMIC TESTING     TYPE:     IMAGING (NEED IMAGES & REPORT)     CT SCANS Requested- [facility] CTA Chest ([facility]) 4/22/2021   MRI     MAMMO     Xray Chest Complete 4/23/2021   ULTRASOUND Complete US Thoracentesis 4/22/2021   PET       Action    Action Taken 4/27/2021 8:19AM   I called [facility] Ph: [phone]  #0     4/27/2021 3:27PM   I called [facility] again and asked them to send the EKG over for the second time.

## 2021-04-27 NOTE — PROGRESS NOTES
"Dora is a 40 year old who is being evaluated via a billable video visit.      How would you like to obtain your AVS? MyChart  If the video visit is dropped, the invitation should be resent by: Text to cell phone: [phone]  Will anyone else be joining your video visit? No       Vitals - Patient Reported  Weight (Patient Reported): 122.5 kg (270 lb)  Height (Patient Reported): 157.5 cm (5' 2\")  BMI (Based on Pt Reported Ht/Wt): 49.38  Pain Score: Moderate Pain (5)  Pain Loc: Breast(right below breast on the right side.)    Shelly Solomon, VIOLETTA on 4/27/2021 at 3:28 PM      Video Start Time: 4  Video-Visit Details  Per note  "

## 2021-04-27 NOTE — PROGRESS NOTES
LUNG NODULE & INTERVENTIONAL PULMONARY CLINIC  CLINICS & SURGERY CENTER, [facility], [facility]     Dora Barrera MRN# [id number]   Age: 40 year old YOB: 1980     Reason for Consultation: pleural effusion    Requesting Physician: Fran Oh MD  [address] [place] [address]       Assessment and Plan:    1. Recurrent right pleural effusion. Fluid analysis is exudative with lymphocyte predominance. There is no evidence of acute PE, infection, or cardiac/renal disease. She is anxious that no one is able to figure this out. I discussed with her that we could narrow the ddx with pleuroscopy+bx. She is agreeable and wants to do it this week. We will plan pleuroscopy+pleural biopsy then CT chest prior to discharge home.      Billing: I spent 30 minutes including face to face, chart review, personal and documented interpretation of results, counseling and coordination of care about the issues above.      Cory Ennis MD   of Medicine  Interventional Pulmonology  Department of Pulmonary, Allergy, Critical Care and Sleep Medicine   [facility]  Pager: [phone]          History:     Dora Barrera is a 40 year old female with sig h/o for anxiety, GERD, HTN who is here for evaluation/followup of pleural effusion.    - No new resp sx or complaints. Denies dyspnea or cough.   - Had chest pain 4/16 following COVID (first dose pfizer vaccine on 4/9). Went to ED and found right pleural effusion. Had CT PE r/o 4/22 and did not show PE. Had thoracentesis and removed 1L. Fluid was red in color. Fluid analysis was EBC 9314 predominately lymphocytes. I could not find if cytology was sent. She did have progressive dyspnea. No fever/chills. On birth control.   - Personal hx of cancer: No  - Family hx of cancer: Mom had kidney cancer. Dad has prostate cancer.   - Exposure hx: Denies asbestos or radon exposure   - Tobacco hx: Never  smoker   - My interpretation of the images relevant for this visit includes: right pleural fluid    - My interpretation of the PFT's relevant for this visit includes: None     Imaging  CXR with moderate-large pleural effusion    Other active medical problems include:   - Has HTN and GERD. Stable.          Past Medical History:      Past Medical History:   Diagnosis Date     Anxiety      Gastroesophageal reflux disease     UGI:09/09/20, Re-evaluate in 2 months.     Hypertension      Status post endoscopy 09/08/2020    Mild GERD and low protein.           Past Surgical History:      Past Surgical History:   Procedure Laterality Date     APPENDECTOMY      '97 Incidental with another procedure.     CHOLECYSTECTOMY      2009     EXCISE MASS TRUNK Right 9/21/2020    Procedure: EXCISION RIGHT BREAST SKIN CYST;  Surgeon: Fay Mcgraw MD;  Location: [facility] OR     GYN SURGERY      Rt. ovary removed.'94, L' Ov.cyst removed '97     HC ASPIRATION OF OVARIAN CYST Left      OOPHORECTOMY Right     Ovarian cyst, torsion          Social History:     Social History     Tobacco Use     Smoking status: Never Smoker     Smokeless tobacco: Never Used   Substance Use Topics     Alcohol use: Yes     Comment: occ          Family History:     Family History   Problem Relation Age of Onset     Hypertension Mother      Cancer Mother 72        dx with kidney cancer - stage 4     Heart Disease Father      Diabetes Father      Hypertension Father      Colon Cancer Maternal Grandfather         in his 70s     Breast Cancer No family hx of            Allergies:    No Known Allergies       Medications:     Current Outpatient Medications   Medication Sig     amoxicillin-clavulanate (AUGMENTIN) 875-125 MG tablet Take 1 tablet by mouth 2 times daily for 10 days     escitalopram (LEXAPRO) 20 MG tablet Take 1 tablet (20 mg) by mouth daily     lisinopril (ZESTRIL) 20 MG tablet Take 1.5 tablets (30 mg) by mouth daily     norgestimate-ethinyl estradiol  (SPRINTEC 28) 0.25-35 MG-MCG tablet Take 1 tablet by mouth daily Continuously.     omeprazole (PRILOSEC) 20 MG DR capsule Take 20 mg by mouth 2 times daily     No current facility-administered medications for this visit.           Review of Systems:     CONSTITUTIONAL: negative for fever, chills, change in weight  INTEGUMENTARY/SKIN: no rash or obvious new lesions  ENT/MOUTH: no sore throat, new sinus pain or nasal drainage  RESP: see interval history  CV: negative for chest pain, palpitations or peripheral edema  GI: no nausea, vomiting, change in stools  : no dysuria  MUSCULOSKELETAL: no myalgias, arthralgias  ENDOCRINE: blood sugars with adequate control  PSYCHIATRIC: mood stable  LYMPHATIC: no new lymphadenopathy  HEME: no bleeding or easy bruisability  NEURO: no numbness, weakness, headaches         Physical Exam:     Constitutional - looks well, in no apparent distress  Eyes - no redness or discharge  Respiratory -breathing appears comfortable. No wheeze or rhonchi.   Cardiac -- Normal rate, rhythm.   Skin - No appreciable discoloration or lesions (very limited exam)  Neurological - No apparent tremors. Speech fluent and articlate  Psychiatric - no signs of delirium or anxiety     Exam limited to that easily identified on a virtual visit. The rest of a comprehensive physical examination is deferred due to PHE (public health emergency) video visit restrictions.         Current Laboratory Data:   All laboratory and imaging data reviewed.    No results found for this or any previous visit (from the past 24 hour(s)).         Previous Pulmonary Function Testing   No results found for: 20001  No results found for: 20002  No results found for: 20003  No results found for: 20015  No results found for: 20016  No results found for: 20027  No results found for: 20028  No results found for: 20029  No results found for: 20079  No results found for: 20080  No results found for: 20081  No results found for: 20335  No  results found for: 20105  No results found for: 20053  No results found for: 20054  No results found for: 20055         Previous Chest Imaging   No images are attached to the encounter.  No images are attached to the encounter or orders placed in the encounter.         Previous Cardiology Imaging   No results found for this or any previous visit (from the past 8760 hour(s)).

## 2021-04-27 NOTE — PROGRESS NOTES
Per chart review, pt was able to get scheduled with Dr. Ennis on 4/27/21.   Melissa Lin, RN, BSN, PAM  RN Care Coordinator  [facility]  [phone]

## 2021-04-28 ENCOUNTER — TELEPHONE (OUTPATIENT)
Dept: PEDIATRICS | Facility: CLINIC | Age: 41
End: 2021-04-28

## 2021-04-28 ENCOUNTER — TELEPHONE (OUTPATIENT)
Dept: PULMONOLOGY | Facility: CLINIC | Age: 41
End: 2021-04-28

## 2021-04-28 DIAGNOSIS — J90 PLEURAL EFFUSION: Primary | ICD-10-CM

## 2021-04-28 DIAGNOSIS — K90.49 PROTEIN LOSING ENTEROPATHY: ICD-10-CM

## 2021-04-28 DIAGNOSIS — J90 PLEURAL EFFUSION: ICD-10-CM

## 2021-04-28 LAB
LABORATORY COMMENT REPORT: NORMAL
SARS-COV-2 RNA RESP QL NAA+PROBE: NEGATIVE
SARS-COV-2 RNA RESP QL NAA+PROBE: NORMAL
SPECIMEN SOURCE: NORMAL
SPECIMEN SOURCE: NORMAL

## 2021-04-28 PROCEDURE — U0005 INFEC AGEN DETEC AMPLI PROBE: HCPCS | Performed by: INTERNAL MEDICINE

## 2021-04-28 PROCEDURE — U0003 INFECTIOUS AGENT DETECTION BY NUCLEIC ACID (DNA OR RNA); SEVERE ACUTE RESPIRATORY SYNDROME CORONAVIRUS 2 (SARS-COV-2) (CORONAVIRUS DISEASE [COVID-19]), AMPLIFIED PROBE TECHNIQUE, MAKING USE OF HIGH THROUGHPUT TECHNOLOGIES AS DESCRIBED BY CMS-2020-01-R: HCPCS | Performed by: INTERNAL MEDICINE

## 2021-04-28 RX ORDER — ACETAMINOPHEN 500 MG
500-1000 TABLET ORAL EVERY 6 HOURS PRN
COMMUNITY
End: 2022-09-09

## 2021-04-28 NOTE — OR NURSING
Med question before procedure tomorow  Received: Today  Message Contents   Rosemary Sarmiento RN [place]berg, Heather Colleen, MAURICE BUCIO             Thanks! I will call her and tell her to hold it.     Rosemary Sarmiento    Previous Messages    ----- Message -----   From: Jory Valentino APRN CNS   Sent: 4/28/2021   4:09 PM CDT   To: MAURICE Colon, *   Subject: RE: Med question before procedure tomorow         I say hold until after the procedure.     Thanks   H   ----- Message -----   From: Rosemary Sarmiento RN   Sent: 4/28/2021   4:02 PM CDT   To: MAURICE Colon, *   Subject: Med question before procedure tomorow             Hi Jeremy,     Pre-op call done with pt. for her PLEUROSCOPY and pleural biopsies, Flexible bronchoscopy, airway exam and possible biopsies with Dr Ennis tomorrow.     Should she take or hold Augmentin in the am DOS?     Thanks so much.     Rosemary Sarmiento, RN, BSN   Preanesthesia Screening   [phone]             I called pt and told her to hold Augmentin per Jory Valentino.

## 2021-04-28 NOTE — TELEPHONE ENCOUNTER
Dr. Contreras-    She saw pulmonary yesterday.  See notes below. She does not have this scheduled yet. She thinks it will be tomorrow. She will call and cancel her appt with you if needed. Radha Ambriz, RN on 4/28/2021 at 8:07 AM    Assessment and Plan:     1. Recurrent right pleural effusion. Fluid analysis is exudative with lymphocyte predominance. There is no evidence of acute PE, infection, or cardiac/renal disease. She is anxious that no one is able to figure this out. I discussed with her that we could narrow the ddx with pleuroscopy+bx. She is agreeable and wants to do it this week. We will plan pleuroscopy+pleural biopsy then CT chest prior to discharge home.      Billing: I spent 30 minutes including face to face, chart review, personal and documented interpretation of results, counseling and coordination of care about the issues above.        Cory Ennis MD   of Medicine  Interventional Pulmonology  Department of Pulmonary, Allergy, Critical Care and Sleep Medicine   [facility], [place]  Pager: [phone]

## 2021-04-28 NOTE — TELEPHONE ENCOUNTER
Thank her for the update.     Can we see if we can get records from her Surgery appointment earlier this week?    RACHEL Contreras MD  Internal Medicine-Pediatrics

## 2021-04-28 NOTE — TELEPHONE ENCOUNTER
The ER referred her surgeon, but the surgeon saw her on the schedule and said she needed to see Pulmonary first. So she has not been seen by surgery yet. Radha Ambriz RN on 4/28/2021 at 11:26 AM

## 2021-04-28 NOTE — TELEPHONE ENCOUNTER
Spoke with patient to schedule procedure with Dr. Cory Ennis   Procedure was scheduled on 04/29 at [facility] OR  Patient will have H&P with Dr. Ennis will complete DOS    Patient is aware a COVID-19 test is needed before their procedure. The test should be with-in 4 days of their procedure.   Test Details: Date 04/28 Location [facility]     Patient is aware a / is needed day of surgery.   Surgery letter was sent via [employer], patient has my direct contact information for any further questions.

## 2021-04-28 NOTE — TELEPHONE ENCOUNTER
Left message for patient to call back. Need to know what surgeon she went to yesterday.  Radha Ambriz RN on 4/28/2021 at 11:05 AM

## 2021-04-29 ENCOUNTER — ANESTHESIA EVENT (OUTPATIENT)
Dept: SURGERY | Facility: CLINIC | Age: 41
End: 2021-04-29
Payer: COMMERCIAL

## 2021-04-29 ENCOUNTER — APPOINTMENT (OUTPATIENT)
Dept: GENERAL RADIOLOGY | Facility: CLINIC | Age: 41
End: 2021-04-29
Attending: STUDENT IN AN ORGANIZED HEALTH CARE EDUCATION/TRAINING PROGRAM
Payer: COMMERCIAL

## 2021-04-29 ENCOUNTER — HOSPITAL ENCOUNTER (OUTPATIENT)
Facility: CLINIC | Age: 41
Discharge: HOME | End: 2021-04-29
Attending: INTERNAL MEDICINE | Admitting: INTERNAL MEDICINE
Payer: COMMERCIAL

## 2021-04-29 ENCOUNTER — APPOINTMENT (OUTPATIENT)
Dept: CT IMAGING | Facility: CLINIC | Age: 41
End: 2021-04-29
Attending: INTERNAL MEDICINE
Payer: COMMERCIAL

## 2021-04-29 ENCOUNTER — ANESTHESIA (OUTPATIENT)
Dept: SURGERY | Facility: CLINIC | Age: 41
End: 2021-04-29
Payer: COMMERCIAL

## 2021-04-29 VITALS
HEIGHT: 62 IN | WEIGHT: 264.33 LBS | DIASTOLIC BLOOD PRESSURE: 99 MMHG | RESPIRATION RATE: 14 BRPM | SYSTOLIC BLOOD PRESSURE: 139 MMHG | OXYGEN SATURATION: 93 % | HEART RATE: 100 BPM | BODY MASS INDEX: 48.64 KG/M2 | TEMPERATURE: 97.6 F

## 2021-04-29 DIAGNOSIS — J90 PLEURAL EFFUSION: ICD-10-CM

## 2021-04-29 LAB
BILIRUB FLD-MCNC: 0.4 MG/DL
GLUCOSE BLDC GLUCOMTR-MCNC: 88 MG/DL (ref 70–99)
GLUCOSE FLD-MCNC: 87 MG/DL
GRAM STN SPEC: NORMAL
GRAM STN SPEC: NORMAL
HCG UR QL: NEGATIVE
LDH FLD L TO P-CCNC: 225 U/L
LIPASE FLD-CCNC: 27 U/L
PROT FLD-MCNC: 4.7 G/DL
SPECIMEN SOURCE FLD: NORMAL
SPECIMEN SOURCE: NORMAL
TRIGL FLD-MCNC: 68 MG/DL

## 2021-04-29 PROCEDURE — 83690 ASSAY OF LIPASE: CPT | Performed by: INTERNAL MEDICINE

## 2021-04-29 PROCEDURE — 87081 CULTURE SCREEN ONLY: CPT | Performed by: INTERNAL MEDICINE

## 2021-04-29 PROCEDURE — 710N000012 HC RECOVERY PHASE 2, PER MINUTE: Performed by: INTERNAL MEDICINE

## 2021-04-29 PROCEDURE — 88305 TISSUE EXAM BY PATHOLOGIST: CPT | Mod: 26 | Performed by: PATHOLOGY

## 2021-04-29 PROCEDURE — 250N000024 HC ISOFLURANE, PER MIN: Performed by: INTERNAL MEDICINE

## 2021-04-29 PROCEDURE — 71250 CT THORAX DX C-: CPT | Mod: 26 | Performed by: RADIOLOGY

## 2021-04-29 PROCEDURE — 89051 BODY FLUID CELL COUNT: CPT | Performed by: INTERNAL MEDICINE

## 2021-04-29 PROCEDURE — 87075 CULTR BACTERIA EXCEPT BLOOD: CPT | Performed by: INTERNAL MEDICINE

## 2021-04-29 PROCEDURE — 88185 FLOWCYTOMETRY/TC ADD-ON: CPT | Performed by: INTERNAL MEDICINE

## 2021-04-29 PROCEDURE — 87205 SMEAR GRAM STAIN: CPT | Performed by: INTERNAL MEDICINE

## 2021-04-29 PROCEDURE — 999N001018 HC STATISTIC H-CELL BLOCK W/STAIN: Performed by: INTERNAL MEDICINE

## 2021-04-29 PROCEDURE — 88184 FLOWCYTOMETRY/ TC 1 MARKER: CPT | Performed by: INTERNAL MEDICINE

## 2021-04-29 PROCEDURE — 250N000009 HC RX 250: Performed by: ANESTHESIOLOGY

## 2021-04-29 PROCEDURE — 88189 FLOWCYTOMETRY/READ 16 & >: CPT | Performed by: PATHOLOGY

## 2021-04-29 PROCEDURE — 84157 ASSAY OF PROTEIN OTHER: CPT | Performed by: INTERNAL MEDICINE

## 2021-04-29 PROCEDURE — 250N000011 HC RX IP 250 OP 636: Performed by: ANESTHESIOLOGY

## 2021-04-29 PROCEDURE — 82945 GLUCOSE OTHER FLUID: CPT | Performed by: INTERNAL MEDICINE

## 2021-04-29 PROCEDURE — 250N000009 HC RX 250: Performed by: NURSE ANESTHETIST, CERTIFIED REGISTERED

## 2021-04-29 PROCEDURE — 87206 SMEAR FLUORESCENT/ACID STAI: CPT | Performed by: INTERNAL MEDICINE

## 2021-04-29 PROCEDURE — 88112 CYTOPATH CELL ENHANCE TECH: CPT | Mod: 26 | Performed by: PATHOLOGY

## 2021-04-29 PROCEDURE — 82962 GLUCOSE BLOOD TEST: CPT

## 2021-04-29 PROCEDURE — 258N000003 HC RX IP 258 OP 636: Performed by: ANESTHESIOLOGY

## 2021-04-29 PROCEDURE — 999N000065 XR CHEST PORT 1 VIEW

## 2021-04-29 PROCEDURE — 83615 LACTATE (LD) (LDH) ENZYME: CPT | Performed by: INTERNAL MEDICINE

## 2021-04-29 PROCEDURE — 88305 TISSUE EXAM BY PATHOLOGIST: CPT | Mod: TC | Performed by: INTERNAL MEDICINE

## 2021-04-29 PROCEDURE — 250N000011 HC RX IP 250 OP 636: Performed by: NURSE ANESTHETIST, CERTIFIED REGISTERED

## 2021-04-29 PROCEDURE — 32609 THORACOSCOPY W/BX PLEURA: CPT | Performed by: INTERNAL MEDICINE

## 2021-04-29 PROCEDURE — 999N001014 HC STATISTIC CYTO WRIGHT STAIN TC: Performed by: INTERNAL MEDICINE

## 2021-04-29 PROCEDURE — 999N000141 HC STATISTIC PRE-PROCEDURE NURSING ASSESSMENT: Performed by: INTERNAL MEDICINE

## 2021-04-29 PROCEDURE — 82247 BILIRUBIN TOTAL: CPT | Performed by: INTERNAL MEDICINE

## 2021-04-29 PROCEDURE — 81025 URINE PREGNANCY TEST: CPT | Performed by: ANESTHESIOLOGY

## 2021-04-29 PROCEDURE — 710N000010 HC RECOVERY PHASE 1, LEVEL 2, PER MIN: Performed by: INTERNAL MEDICINE

## 2021-04-29 PROCEDURE — 87015 SPECIMEN INFECT AGNT CONCNTJ: CPT | Performed by: INTERNAL MEDICINE

## 2021-04-29 PROCEDURE — 87116 MYCOBACTERIA CULTURE: CPT | Performed by: INTERNAL MEDICINE

## 2021-04-29 PROCEDURE — 71045 X-RAY EXAM CHEST 1 VIEW: CPT | Mod: 26 | Performed by: RADIOLOGY

## 2021-04-29 PROCEDURE — 360N000083 HC SURGERY LEVEL 3 W/ FLUORO, PER MIN: Performed by: INTERNAL MEDICINE

## 2021-04-29 PROCEDURE — 71250 CT THORAX DX C-: CPT

## 2021-04-29 PROCEDURE — 272N000001 HC OR GENERAL SUPPLY STERILE: Performed by: INTERNAL MEDICINE

## 2021-04-29 PROCEDURE — 370N000017 HC ANESTHESIA TECHNICAL FEE, PER MIN: Performed by: INTERNAL MEDICINE

## 2021-04-29 PROCEDURE — 88112 CYTOPATH CELL ENHANCE TECH: CPT | Mod: TC | Performed by: INTERNAL MEDICINE

## 2021-04-29 PROCEDURE — 82664 ELECTROPHORETIC TEST: CPT | Performed by: INTERNAL MEDICINE

## 2021-04-29 PROCEDURE — 87070 CULTURE OTHR SPECIMN AEROBIC: CPT | Performed by: INTERNAL MEDICINE

## 2021-04-29 PROCEDURE — 87102 FUNGUS ISOLATION CULTURE: CPT | Performed by: INTERNAL MEDICINE

## 2021-04-29 PROCEDURE — 999N001137 HC STATISTIC FLOW >15 ABY TC 88189: Performed by: INTERNAL MEDICINE

## 2021-04-29 PROCEDURE — 84478 ASSAY OF TRIGLYCERIDES: CPT | Performed by: INTERNAL MEDICINE

## 2021-04-29 RX ORDER — FENTANYL CITRATE 50 UG/ML
25-50 INJECTION, SOLUTION INTRAMUSCULAR; INTRAVENOUS
Status: DISCONTINUED | OUTPATIENT
Start: 2021-04-29 | End: 2021-04-29 | Stop reason: HOSPADM

## 2021-04-29 RX ORDER — ALBUTEROL SULFATE 0.83 MG/ML
2.5 SOLUTION RESPIRATORY (INHALATION) EVERY 4 HOURS PRN
Status: DISCONTINUED | OUTPATIENT
Start: 2021-04-29 | End: 2021-04-29 | Stop reason: HOSPADM

## 2021-04-29 RX ORDER — ONDANSETRON 4 MG/1
4 TABLET, ORALLY DISINTEGRATING ORAL EVERY 30 MIN PRN
Status: DISCONTINUED | OUTPATIENT
Start: 2021-04-29 | End: 2021-04-29 | Stop reason: HOSPADM

## 2021-04-29 RX ORDER — PROPOFOL 10 MG/ML
INJECTION, EMULSION INTRAVENOUS PRN
Status: DISCONTINUED | OUTPATIENT
Start: 2021-04-29 | End: 2021-04-29

## 2021-04-29 RX ORDER — LIDOCAINE 40 MG/G
CREAM TOPICAL
Status: DISCONTINUED | OUTPATIENT
Start: 2021-04-29 | End: 2021-04-29 | Stop reason: HOSPADM

## 2021-04-29 RX ORDER — NALOXONE HYDROCHLORIDE 0.4 MG/ML
0.2 INJECTION, SOLUTION INTRAMUSCULAR; INTRAVENOUS; SUBCUTANEOUS
Status: DISCONTINUED | OUTPATIENT
Start: 2021-04-29 | End: 2021-04-29 | Stop reason: HOSPADM

## 2021-04-29 RX ORDER — SODIUM CHLORIDE, SODIUM LACTATE, POTASSIUM CHLORIDE, CALCIUM CHLORIDE 600; 310; 30; 20 MG/100ML; MG/100ML; MG/100ML; MG/100ML
INJECTION, SOLUTION INTRAVENOUS CONTINUOUS
Status: DISCONTINUED | OUTPATIENT
Start: 2021-04-29 | End: 2021-04-29 | Stop reason: HOSPADM

## 2021-04-29 RX ORDER — ONDANSETRON 2 MG/ML
INJECTION INTRAMUSCULAR; INTRAVENOUS PRN
Status: DISCONTINUED | OUTPATIENT
Start: 2021-04-29 | End: 2021-04-29

## 2021-04-29 RX ORDER — HYDROMORPHONE HYDROCHLORIDE 1 MG/ML
.3-.5 INJECTION, SOLUTION INTRAMUSCULAR; INTRAVENOUS; SUBCUTANEOUS EVERY 5 MIN PRN
Status: DISCONTINUED | OUTPATIENT
Start: 2021-04-29 | End: 2021-04-29 | Stop reason: HOSPADM

## 2021-04-29 RX ORDER — SCOLOPAMINE TRANSDERMAL SYSTEM 1 MG/1
1 PATCH, EXTENDED RELEASE TRANSDERMAL ONCE
Status: DISCONTINUED | OUTPATIENT
Start: 2021-04-29 | End: 2021-04-29 | Stop reason: HOSPADM

## 2021-04-29 RX ORDER — LIDOCAINE HYDROCHLORIDE 20 MG/ML
INJECTION, SOLUTION INFILTRATION; PERINEURAL PRN
Status: DISCONTINUED | OUTPATIENT
Start: 2021-04-29 | End: 2021-04-29

## 2021-04-29 RX ORDER — FENTANYL CITRATE 50 UG/ML
INJECTION, SOLUTION INTRAMUSCULAR; INTRAVENOUS PRN
Status: DISCONTINUED | OUTPATIENT
Start: 2021-04-29 | End: 2021-04-29

## 2021-04-29 RX ORDER — ONDANSETRON 2 MG/ML
4 INJECTION INTRAMUSCULAR; INTRAVENOUS EVERY 30 MIN PRN
Status: DISCONTINUED | OUTPATIENT
Start: 2021-04-29 | End: 2021-04-29 | Stop reason: HOSPADM

## 2021-04-29 RX ORDER — NALOXONE HYDROCHLORIDE 0.4 MG/ML
0.4 INJECTION, SOLUTION INTRAMUSCULAR; INTRAVENOUS; SUBCUTANEOUS
Status: DISCONTINUED | OUTPATIENT
Start: 2021-04-29 | End: 2021-04-29 | Stop reason: HOSPADM

## 2021-04-29 RX ORDER — GLYCOPYRROLATE 0.2 MG/ML
INJECTION, SOLUTION INTRAMUSCULAR; INTRAVENOUS PRN
Status: DISCONTINUED | OUTPATIENT
Start: 2021-04-29 | End: 2021-04-29

## 2021-04-29 RX ORDER — LABETALOL HYDROCHLORIDE 5 MG/ML
INJECTION, SOLUTION INTRAVENOUS PRN
Status: DISCONTINUED | OUTPATIENT
Start: 2021-04-29 | End: 2021-04-29

## 2021-04-29 RX ADMIN — ONDANSETRON 4 MG: 2 INJECTION INTRAMUSCULAR; INTRAVENOUS at 17:14

## 2021-04-29 RX ADMIN — LIDOCAINE HYDROCHLORIDE 100 MG: 20 INJECTION, SOLUTION INFILTRATION; PERINEURAL at 15:39

## 2021-04-29 RX ADMIN — FENTANYL CITRATE 150 MCG: 50 INJECTION, SOLUTION INTRAMUSCULAR; INTRAVENOUS at 15:39

## 2021-04-29 RX ADMIN — LABETALOL HYDROCHLORIDE 10 MG: 5 INJECTION INTRAVENOUS at 15:46

## 2021-04-29 RX ADMIN — ROCURONIUM BROMIDE 10 MG: 10 INJECTION INTRAVENOUS at 16:28

## 2021-04-29 RX ADMIN — ONDANSETRON 4 MG: 2 INJECTION INTRAMUSCULAR; INTRAVENOUS at 16:39

## 2021-04-29 RX ADMIN — MIDAZOLAM 2 MG: 1 INJECTION INTRAMUSCULAR; INTRAVENOUS at 15:20

## 2021-04-29 RX ADMIN — FENTANYL CITRATE 100 MCG: 50 INJECTION, SOLUTION INTRAMUSCULAR; INTRAVENOUS at 16:11

## 2021-04-29 RX ADMIN — ROCURONIUM BROMIDE 50 MG: 10 INJECTION INTRAVENOUS at 15:39

## 2021-04-29 RX ADMIN — GLYCOPYRROLATE 0.2 MG: 0.2 INJECTION, SOLUTION INTRAMUSCULAR; INTRAVENOUS at 15:39

## 2021-04-29 RX ADMIN — FENTANYL CITRATE 25 MCG: 50 INJECTION, SOLUTION INTRAMUSCULAR; INTRAVENOUS at 17:30

## 2021-04-29 RX ADMIN — PROPOFOL 200 MG: 10 INJECTION, EMULSION INTRAVENOUS at 15:39

## 2021-04-29 RX ADMIN — PROCHLORPERAZINE EDISYLATE 5 MG: 5 INJECTION INTRAMUSCULAR; INTRAVENOUS at 17:45

## 2021-04-29 RX ADMIN — SUGAMMADEX 200 MG: 100 INJECTION, SOLUTION INTRAVENOUS at 16:45

## 2021-04-29 RX ADMIN — SODIUM CHLORIDE, POTASSIUM CHLORIDE, SODIUM LACTATE AND CALCIUM CHLORIDE: 600; 310; 30; 20 INJECTION, SOLUTION INTRAVENOUS at 15:25

## 2021-04-29 RX ADMIN — SCOPALAMINE 1 PATCH: 1 PATCH, EXTENDED RELEASE TRANSDERMAL at 14:23

## 2021-04-29 ASSESSMENT — MIFFLIN-ST. JEOR: SCORE: 1827.25

## 2021-04-29 NOTE — DISCHARGE INSTRUCTIONS
Post-Bronchoscopy Patient Instructions:    April 29, 2021  Dora Barrera      Your procedure (pleuroscopy with biopsies) was completed without any immediate complications.      You may cough up scant amount of blood for the next 12-24 hours. If you have excessive cough with blood, chest pain, shortness of breath or other concerning symptoms, please report to the closest emergency room.      You may experience low grade (less than 100.5 F) fever next 24 hours for which you can take Tylenol. If the fever persists more than 24 hours contact our office or your primary care provider.      Our office (Pulmonary--[phone]) will call you when the results from today's procedure become available.      You  resume your regular diet as it was prior to procedure.      Should you have any question, please do not hesitate to call our office.    Stitches can be removed in one week by your primary care office    [facility], [place]  Same-Day Surgery   Adult Discharge Orders & Instructions     For 24 hours after surgery    1. Get plenty of rest.  A responsible adult must stay with you for at least 24 hours after you leave the hospital.   2. Do not drive or use heavy equipment.  If you have weakness or tingling, don't drive or use heavy equipment until this feeling goes away.  3. Do not drink alcohol.  4. Avoid strenuous or risky activities.  Ask for help when climbing stairs.   5. You may feel lightheaded.  IF so, sit for a few minutes before standing.  Have someone help you get up.   6. If you have nausea (feel sick to your stomach): Drink only clear liquids such as apple juice, ginger ale, broth or 7-Up.  Rest may also help.  Be sure to drink enough fluids.  Move to a regular diet as you feel able.  7. You may have a slight fever. Call the doctor if your fever is over 100 F (37.7 C) (taken under the tongue) or lasts longer than 24 hours.  8. You may have a dry mouth, a sore throat, muscle aches  or trouble sleeping.  These should go away after 24 hours.  9. Do not make important or legal decisions.   Call your doctor for any of the followin.  Signs of infection (fever, growing tenderness at the surgery site, a large amount of drainage or bleeding, severe pain, foul-smelling drainage, redness, swelling).    2. It has been over 8 to 10 hours since surgery and you are still not able to urinate (pass water).    3.  Headache for over 24 hours.    4.  Numbness, tingling or weakness the day after surgery (if you had spinal anesthesia).  To contact a doctor, call Dr Cory Ennis at the Pulmonary Clinic - [phone] or:      [phone] and ask for the resident on call for Dr. Ennis or pulmonology (answered 24 hours a day)      Emergency Department:    [facility]: [phone]       (TTY for hearing impaired: [phone])    [facility]: [phone]       (TTY for hearing impaired: [phone])

## 2021-04-29 NOTE — PROCEDURES
INTERVENTIONAL PULMONOLOGY       Procedure(s):    A flexible bronchoscopy  Airway exam  Medical Pleuroscopy (right chest)  Pleural biopsy (right chest)  Therapeutic suctioning (1 sites)  Thoracentesis (right chest)  Chest ultrasound interpretation    Indication:  Recurrent pleural effusion     Attending of Record:  Cory Ennis MD     Interventional Pulmonary Fellow   Shadia Canas MD     Trainees Present:   None     Medications:    General Anesthesia - See anesthesia flowsheet for details    Sedation Time:   Per Anesthesia Care Provider    Time Out:  Performed    The patient's medical record has been reviewed.  The indication for the procedure was reviewed.  The necessary history and physical examination was performed and reviewed.  The risks, benefits and alternatives of the procedure were discussed with the the patient in detail and she had the opportunity to ask questions.  I discussed in particular the potential complications including risks of minor or life-threatening bleeding and/or infection, respiratory failure, vocal cord trauma / paralysis, pneumothorax, and discomfort. Sedation risks were also discussed including abnormal heart rhythms, low blood pressure, and respiratory failure. All questions were answered to the best of my ability.  Verbal and written informed consent was obtained.  The proposed procedure and the patient's identification were verified prior to the procedure by the physician and the surgical team.    The patient was assessed for the adequacy for the procedure and to receive medications.   Mental Status:  Alert and oriented x 3  Airway examination:  Class I (Complete visualization of soft palate)  Pulmonary:  Clear to ausculation bilaterally  CV:  RRR, no murmurs or gallops  ASA Grade:  (I)  Normal healthy patient    After clinical evaluation and reviewing the indication, risks, alternatives and benefits of the procedure the patient was deemed to be in satisfactory condition to  undergo the procedure.      A Tuberculosis risk assessment was performed:  The patient has no known RISK of Tuberculosis    The procedure was performed in a negative airflow room: The patient could not be moved to a negative airflow room because of needed OR for the procedure    Maneuvers / Procedure:      A Flexible  bronchoscope was used for the procedure. The patient was orally intubated with a # 8.5 ETT and the flexible scope was passed through.    Airway Examination: A complete airway examination was performed from the distal trachea to the subsegmental level in each lobe of both lungs.  Pertinent findings include no endobronchial lesion.       Chest ultrasound: The Right side was ultasounded and demonstrated pleural fluid. The diaphragm, heart and lung tissue were clearly visualized. Other findings include diaphragm, lung and fluid visualized  Pleuroscopy: The patient was brought back to the operating room, placed supine on the operating table. General anesthesia was smoothly induced, endotracheal intubation was established. A surgical timeout was performed. The patient was positioned in the left lateral decubitus position. The right chest was prepped and draped in standard surgical fashion. We made sure all of his bony prominences were well padded and his upper extremity was not in any significant stretch. Preoperative antibiotics were given. A small, less than 1 cm incision was made in the posterior axillary line above the approximately 8th rib. Under an apneic spell, the chest cavity was entered safely. A trocar was introduced. The pleuroscope was introduced. We did multiple biopsies with the forceps and also with standard biopsy graspers. Once we had enough of the tissue for pathology, we then removed the scope, placed a red rubber catheter in the chest and with a Valsalva maneuver, placed a figure-of-eight around the red rubber catheter and sucked the remainder pneumothorax in place as we tied the suture  down. A 2-0 silk suture was used to close the skin. Steri-Strips and sterile dressings were applied. The patient tolerated the procedure well. At the end of the procedure, the sponge, instrument and needle counts were correct. EBL was minimal  Therapeutic suctioning: 15-20min of operative time was spent clearing out the airway of debris, blood and mucous prior to the intervention.   Thoracentesis: Pleural fluid removed via pleuroscope. The fluid was straw color/consistency. A total of 1750cc fluid was removed. Fluid was sent to micro and cytology for analysis     Any disposable equipment was visually inspected and deemed to be intact immediately post procedure.      Relevant Pictures  Diaphragm with collapsed lung      View of apex      Small area of adhesion, inferior portion of lung      Area of inflammation along medial wall      Band of tissue extending from lung to chest wall, detached from chest wall with cryoprobe      Recommendations:     -->  Successful flex bronch, airway exam, pleuroscopy and pleural biopsies with fluid drainage and therapeutic suctioning   -->  Post-op CXR  -->  Follow-up on fluid analysis and pleural bx results   -->  Return in clinic in 4-weeks  --> ct chest post-procedure      Cory Ennis MD, MHA    of Medicine  Interventional Pulmonary  Department of Pulmonary, Allergy, Critical Care and Sleep Medicine   [facility]  Pager: [phone]   Office: [phone]  Email: [email or web address]    NICKI Ball  Clinical Nurse Specialist  Department of Thoracic Surgery  Office: [phone]  Email: [email or web address]    Maria Teresa Hines  Interventional Pulmonology Surgery Scheduler  Office: [phone]  Email: [email or web address]

## 2021-04-29 NOTE — PROGRESS NOTES
RN RECOVERY NOTE  Assigned as pt nurse while pt recovering in phase II prior to discharging home   Beth EUCEDA provided report via telephone @ 1820  Pt arrive to  @ 1825  RN assist pt with dressing, transferring from cart to chair and obtaining VS  All belongings returned to pt at this time. No report of any items missing   Dr. Ennis called as pt arrived to phase II and informed me that Chest CT will need to be completed prior to pt discharging home. Dr. Ennis would like to be notified once CT completed prior to pt discharging.   Pt transport placed to take pt to CT for scan while in phase II  CT called and informed that pt will be taken down by transport in approximately 45 mins   Pt sitting up in chair alert oriented, following commands, answering questions appropriately  Call light within reach. Pt verbalizes understanding of use    Pt continues to tolerate po intake in phase II (ice and soda)  Discharge instruction printed and provided for pt to take upon discharge. Instructions reviewed over the phone with Hernando HART (father). Education provided. Questions encouraged and answered. Over the phone consent obtained. (see form for further details).   Handoff Shiela EUCEDA @ 1910

## 2021-04-29 NOTE — PROGRESS NOTES
PRE-OP RN NOTE:  Assigned as pt nurse while pt in Pre op   Report from Emy RN @ 143  Informed that all pre op tasks are completed   Pt sitting up in chart alert and oriented. Following commands and answering questions appropriately. No report of discomfort reported by pt @ this time  Pt has call light within reach and verbalizes understanding of use   scopolamine patch in place behind right ear   Delayed for procedure due to prior procedure running over.   Consent signed   PIV in place and patent.  Pt has phone with her and is able to update family

## 2021-04-29 NOTE — ANESTHESIA CARE TRANSFER NOTE
Patient: Dora Barrera    Procedure(s):  PLEUROSCOPY and pleural biopsies  Flexible bronchoscopy, airway exam and possible biopsies    Diagnosis: Pleural effusion [J90]  Diagnosis Additional Information: No value filed.    Anesthesia Type:   General     Note:    Oropharynx: oropharynx clear of all foreign objects  Level of Consciousness: awake  Oxygen Supplementation: face mask    Independent Airway: airway patency satisfactory and stable  Dentition: dentition unchanged  Vital Signs Stable: post-procedure vital signs reviewed and stable  Report to RN Given: handoff report given  Patient transferred to: PACU    Handoff Report: Identifed the Patient, Identified the Reponsible Provider, Reviewed the pertinent medical history, Discussed the surgical course, Reviewed Intra-OP anesthesia mangement and issues during anesthesia, Set expectations for post-procedure period and Allowed opportunity for questions and acknowledgement of understanding      Vitals: (Last set prior to Anesthesia Care Transfer)  CRNA VITALS  4/29/2021 1629 - 4/29/2021 1659      4/29/2021             Ht Rate:  85    SpO2:  100 %    Resp Rate (observed):  16    EKG:  NSR        Electronically Signed By: MAURICE Sutton CRNA  April 29, 2021  4:59 PM

## 2021-04-29 NOTE — ANESTHESIA POSTPROCEDURE EVALUATION
Patient: Dora Barrera    Procedure(s):  PLEUROSCOPY and pleural biopsies  Flexible bronchoscopy, airway exam and possible biopsies    Diagnosis:Pleural effusion [J90]  Diagnosis Additional Information: No value filed.    Anesthesia Type:  General    Note:  Disposition: Admission   Postop Pain Control: Uneventful            Sign Out: Well controlled pain   PONV: No   Neuro/Psych: Uneventful            Sign Out: Acceptable/Baseline neuro status   Airway/Respiratory: Uneventful            Sign Out: Acceptable/Baseline resp. status   CV/Hemodynamics: Uneventful            Sign Out: Acceptable CV status; No obvious hypovolemia; No obvious fluid overload   Other NRE:    DID A NON-ROUTINE EVENT OCCUR? No           Last vitals:  Vitals:    04/29/21 1740 04/29/21 1750 04/29/21 1800   BP: 130/88 (!) 139/92 (!) 131/95   Pulse: 82 74 80   Resp: 8 (!) 6 (!) 7   Temp: 37  C (98.6  F) 36.9  C (98.4  F) 36.8  C (98.2  F)   SpO2: 91% 91% 93%       Last vitals prior to Anesthesia Care Transfer:  CRNA VITALS  4/29/2021 1629 - 4/29/2021 1729      4/29/2021             NIBP:  (!) 169/102    Ht Rate:  85    SpO2:  100 %    Resp Rate (observed):  16    EKG:  NSR          Electronically Signed By: Mumtaz Pat MD  April 29, 2021  6:06 PM

## 2021-04-29 NOTE — ANESTHESIA PREPROCEDURE EVALUATION
Anesthesia Pre-Procedure Evaluation    Patient: Dora Barrera   MRN: [id number] : 1980        Preoperative Diagnosis: Pleural effusion [J90]   Procedure : Procedure(s):  PLEUROSCOPY and pleural biopsies  Flexible bronchoscopy, airway exam and possible biopsies     Past Medical History:   Diagnosis Date     Anxiety      Gastroesophageal reflux disease     UGI:20, Re-evaluate in 2 months.     Hypertension      Status post endoscopy 2020    Mild GERD and low protein.      Past Surgical History:   Procedure Laterality Date     APPENDECTOMY       Incidental with another procedure.     CHOLECYSTECTOMY      2009     EXCISE MASS TRUNK Right 2020    Procedure: EXCISION RIGHT BREAST SKIN CYST;  Surgeon: Fay Mcgraw MD;  Location: [facility] OR     GYN SURGERY      Rt. ovary removed., L' Ov.cyst removed      HC ASPIRATION OF OVARIAN CYST Left      OOPHORECTOMY Right     Ovarian cyst, torsion      No Known Allergies   Social History     Tobacco Use     Smoking status: Never Smoker     Smokeless tobacco: Never Used   Substance Use Topics     Alcohol use: Yes     Comment: occ      Wt Readings from Last 1 Encounters:   21 119.9 kg (264 lb 5.3 oz)        Anesthesia Evaluation   Pt has had prior anesthetic. Type of anesthetic: Motion sickness.    No history of anesthetic complications       ROS/MED HX  ENT/Pulmonary: Comment: Pleural effusion      Neurologic:       Cardiovascular:     (+) hypertension-----    METS/Exercise Tolerance:     Hematologic:       Musculoskeletal:       GI/Hepatic:     (+) GERD, Asymptomatic on medication,     Renal/Genitourinary:       Endo:     (+) Obesity,     Psychiatric/Substance Use:     (+) psychiatric history anxiety     Infectious Disease:       Malignancy:       Other:            Physical Exam    Airway        Mallampati: II   TM distance: < 3 FB   Neck ROM: full   Mouth opening: > 3 cm    Respiratory Devices and Support         Dental  no notable dental  history         Cardiovascular             Pulmonary                   OUTSIDE LABS:  CBC: No results found for: WBC, HGB, HCT, PLT  BMP:   Lab Results   Component Value Date     09/10/2020     06/25/2020    POTASSIUM 4.5 03/09/2021    POTASSIUM 4.0 09/10/2020    CHLORIDE 109 09/10/2020    CHLORIDE 112 (H) 06/25/2020    CO2 24 09/10/2020    CO2 21 06/25/2020    BUN 9 09/10/2020    BUN 12 06/25/2020    CR 0.64 03/09/2021    CR 0.77 09/10/2020    GLC 70 (A) 03/09/2021    GLC 89 09/10/2020     COAGS:   Lab Results   Component Value Date    INR 1.00 08/03/2020     POC:   Lab Results   Component Value Date    BGM 88 04/29/2021    HCG Negative 04/29/2021     HEPATIC:   Lab Results   Component Value Date    ALBUMIN 2.9 (L) 09/10/2020    PROTTOTAL 7.3 04/22/2021    ALT 24 03/09/2021    AST 16 03/09/2021    ALKPHOS 53 09/10/2020    BILITOTAL 0.3 09/10/2020     OTHER:   Lab Results   Component Value Date    LACT 0.7 04/22/2021    A1C 5.4 06/25/2020    SORAYA 8.3 (L) 09/10/2020    CRP 40.0 (H) 08/03/2020    SED 23 (H) 08/03/2020       Anesthesia Plan    ASA Status:  3   NPO Status:  NPO Appropriate    Anesthesia Type: General.     - Airway: ETT   Induction: Intravenous, Propofol.   Maintenance: Balanced.   Techniques and Equipment:     - Airway: Video-Laryngoscope, Fiberoptic Bronchoscope, Double lumen ETT         Consents    Anesthesia Plan(s) and associated risks, benefits, and realistic alternatives discussed. Questions answered and patient/representative(s) expressed understanding.     - Discussed with:  Patient      - Extended Intubation/Ventilatory Support Discussed: No.      - Patient is DNR/DNI Status: No    Use of blood products discussed: No .     Postoperative Care    Pain management: IV analgesics.   PONV prophylaxis: Ondansetron (or other 5HT-3), Dexamethasone or Solumedrol, Scopolamine patch, Background Propofol Infusion     Comments:                Mumtaz Riley MD

## 2021-04-30 ENCOUNTER — IMMUNIZATION (OUTPATIENT)
Dept: NURSING | Facility: CLINIC | Age: 41
End: 2021-04-30
Attending: INTERNAL MEDICINE
Payer: COMMERCIAL

## 2021-04-30 LAB — COPATH REPORT: NORMAL

## 2021-04-30 PROCEDURE — 0002A PR COVID VAC PFIZER DIL RECON 30 MCG/0.3 ML IM: CPT

## 2021-04-30 PROCEDURE — 91300 PR COVID VAC PFIZER DIL RECON 30 MCG/0.3 ML IM: CPT

## 2021-04-30 NOTE — OR NURSING
MD Cho paged at 1950: 3C bed 7: DARIEN Barrera- CT has been completed. Okay for patient to discharge? Thanks! Shiela JIMENEZ TANNER CHAPMAN [phone] or [phone]    Spoke with MD Ennis over the phone. Aware that patient's oxygen saturation is 93-95% on room air. MD Cho reviewed CT. Patient may discharge home. Patient does not need to be seen by pulmonology team prior to discharge. Patient will see MD Cho in clinic and MD Ennis will follow up with patient next week.

## 2021-04-30 NOTE — OR NURSING
Instructions reviewed with father Hernando over the phone. Responsible adult verbalized understanding of discharge instructions. Paper copy of discharge instructions sent with patient.

## 2021-05-01 ENCOUNTER — HOSPITAL ENCOUNTER (EMERGENCY)
Facility: CLINIC | Age: 41
Discharge: HOME | End: 2021-05-01
Attending: EMERGENCY MEDICINE | Admitting: EMERGENCY MEDICINE
Payer: COMMERCIAL

## 2021-05-01 VITALS
DIASTOLIC BLOOD PRESSURE: 98 MMHG | RESPIRATION RATE: 18 BRPM | BODY MASS INDEX: 48.35 KG/M2 | SYSTOLIC BLOOD PRESSURE: 156 MMHG | OXYGEN SATURATION: 91 % | HEART RATE: 92 BPM | HEIGHT: 62 IN | TEMPERATURE: 98.5 F

## 2021-05-01 DIAGNOSIS — R21 RASH: ICD-10-CM

## 2021-05-01 PROCEDURE — 99282 EMERGENCY DEPT VISIT SF MDM: CPT | Performed by: EMERGENCY MEDICINE

## 2021-05-01 ASSESSMENT — ENCOUNTER SYMPTOMS
DIFFICULTY URINATING: 0
NAUSEA: 0
VOMITING: 0
ADENOPATHY: 0
SHORTNESS OF BREATH: 0
COLOR CHANGE: 0
NECK PAIN: 0
ABDOMINAL PAIN: 0
ARTHRALGIAS: 0
CHILLS: 0
FEVER: 0
NECK STIFFNESS: 0
CONFUSION: 0
EYE REDNESS: 0
BRUISES/BLEEDS EASILY: 0
HEADACHES: 0

## 2021-05-01 NOTE — ED PROVIDER NOTES
[place] EMERGENCY DEPARTMENT ([facility])  5/01/21      History     Chief Complaint   Patient presents with     Rash     Post-op Problem     The history is provided by the patient and medical records.     Dora Barrera is a 40 year old female with a medical history significant for pleural effusion s/p bronchoscopy and pleuroscopy (4/29/2021) and HTN who presents to the emergency department for evaluation of a rash which began today.  Patient notes a procedure on Thursday which she suspects triggered her rash. She endorses sensitive rash with small red spots which began this morning. She also notes that she received her second COVID vaccine on Monday morning.  She denies pain or fevers.    Per chart review, patient underwent pleuroscopy, pleural biopsy, therapeutic suctioning, and thoracentesis on 4/29/2021 at [facility].    Past Medical History:   Diagnosis Date     Anxiety      Gastroesophageal reflux disease     UGI:09/09/20, Re-evaluate in 2 months.     Hypertension      Status post endoscopy 09/08/2020    Mild GERD and low protein.       Past Surgical History:   Procedure Laterality Date     APPENDECTOMY      '97 Incidental with another procedure.     BRONCHOSCOPY FLEXIBLE N/A 4/29/2021    Procedure: Flexible bronchoscopy, airway exam and possible biopsies;  Surgeon: Cory Ennis MD;  Location: [facility] OR     CHOLECYSTECTOMY      2009     EXCISE MASS TRUNK Right 9/21/2020    Procedure: EXCISION RIGHT BREAST SKIN CYST;  Surgeon: Fay Mcgraw MD;  Location: [facility] OR     GYN SURGERY      Rt. ovary removed.'94, L' Ov.cyst removed '97     HC ASPIRATION OF OVARIAN CYST Left      OOPHORECTOMY Right     Ovarian cyst, torsion     PLEUROSCOPY Right 4/29/2021    Procedure: PLEUROSCOPY and pleural biopsies;  Surgeon: Cory Ennis MD;  Location: [facility] OR       Family History   Problem Relation Age of Onset     Hypertension Mother      Cancer Mother 72        dx with  "kidney cancer - stage 4     Heart Disease Father      Diabetes Father      Hypertension Father      Colon Cancer Maternal Grandfather         in his 70s     Breast Cancer No family hx of        Social History     Tobacco Use     Smoking status: Never Smoker     Smokeless tobacco: Never Used   Substance Use Topics     Alcohol use: Yes     Comment: occ       No current facility-administered medications for this encounter.      Current Outpatient Medications   Medication     amoxicillin-clavulanate (AUGMENTIN) 875-125 MG tablet     escitalopram (LEXAPRO) 20 MG tablet     lisinopril (ZESTRIL) 20 MG tablet     acetaminophen (TYLENOL) 500 MG tablet     norgestimate-ethinyl estradiol (SPRINTEC 28) 0.25-35 MG-MCG tablet     omeprazole (PRILOSEC) 20 MG DR capsule      No Known Allergies      Review of Systems   Constitutional: Negative for chills and fever.   HENT: Negative for congestion.    Eyes: Negative for redness.   Respiratory: Negative for shortness of breath.    Cardiovascular: Negative for chest pain.   Gastrointestinal: Negative for abdominal pain, nausea and vomiting.   Genitourinary: Negative for difficulty urinating.   Musculoskeletal: Negative for arthralgias, neck pain and neck stiffness.   Skin: Positive for rash. Negative for color change.   Neurological: Negative for headaches.   Hematological: Negative for adenopathy. Does not bruise/bleed easily.   Psychiatric/Behavioral: Negative for confusion.   All other systems reviewed and are negative.    A complete review of systems was performed with pertinent positives and negatives noted in the HPI, and all other systems negative.    Physical Exam   BP: (!) 172/103  Pulse: 85  Temp: 98.5  F (36.9  C)  Resp: 18  Height: 157.5 cm (5' 2\")  SpO2: 95 %  Physical Exam  Vitals signs and nursing note reviewed.   Constitutional:       General: She is not in acute distress.     Appearance: Normal appearance. She is not diaphoretic.   HENT:      Head: Normocephalic and " atraumatic.      Mouth/Throat:      Pharynx: No oropharyngeal exudate.   Eyes:      General: No scleral icterus.     Extraocular Movements: Extraocular movements intact.      Conjunctiva/sclera: Conjunctivae normal.   Neck:      Musculoskeletal: Normal range of motion and neck supple.   Cardiovascular:      Rate and Rhythm: Normal rate.      Pulses: Normal pulses.      Heart sounds: Normal heart sounds.   Pulmonary:      Effort: Pulmonary effort is normal. No respiratory distress.      Breath sounds: Normal breath sounds.   Abdominal:      Palpations: Abdomen is soft.      Tenderness: There is no abdominal tenderness.   Musculoskeletal: Normal range of motion.         General: No tenderness.   Skin:     General: Skin is warm.      Capillary Refill: Capillary refill takes less than 2 seconds.      Findings: Rash present.      Comments: Maculopapular rash on right flank following the skin discoloration noel caused by antiseptic solution.  The rash is extending to patient's back and surrounding the adhesive dressing that was placed over her wound.  There are some patches at the corners of adhesive dressing that are blanching.  No vesicles.  No tenderness.  Surgical wound is clean, dry, intact with sutures in place.   Neurological:      General: No focal deficit present.      Mental Status: She is alert and oriented to person, place, and time.      Cranial Nerves: No cranial nerve deficit.      Sensory: No sensory deficit.   Psychiatric:         Mood and Affect: Mood normal.         Behavior: Behavior normal.         Thought Content: Thought content normal.         Judgment: Judgment normal.         ED Course     6:06 PM  The patient was seen and examined by Connor Hackett MD in Room ED19.     Procedures               No results found for any visits on 05/01/21.  Medications - No data to display     Assessments & Plan (with Medical Decision Making)   40-year-old woman presenting with rash on her right flank.  It appears  the rash is in the area where antiseptic solution was used for skin prep prior to her lung biopsy 2 days ago.  There are no vesicles noted.  No signs of abscess.  I suspect this is likely contact dermatitis.  Her dressing was changed.  Her wound is clean, dry, intact without signs of infection.  I highly doubt that this is shingles.  She is stable for discharge with outpatient follow-up for further evaluation and she also agrees to return if her symptoms worsen.      This part of the medical record was transcribed by [employer] Medical Scribe, from a dictation done by Connor Hackett MD.     I have reviewed the nursing notes. I have reviewed the findings, diagnosis, plan and need for follow up with the patient.    Discharge Medication List as of 5/1/2021  6:36 PM          Final diagnoses:   Rash     Karin CASTLE, am serving as a trained medical scribe to document services personally performed by Connor Hackett MD based on the provider's statements to me on May 1, 2021.  This document has been checked and approved by the attending provider.    IRoxann Nikola, MD, was physically present and have reviewed and verified the accuracy of this note documented by Karin Gallego medical scribe.      --  Connor Hackett MD  [facility] EMERGENCY DEPARTMENT  5/1/2021     Connor Hackett MD  05/01/21 [id number]

## 2021-05-01 NOTE — ED TRIAGE NOTES
Pt c/o rash that showed up today. Recently had thorscopy,  pleural effusion with biopsy done on 4/29/21. Rash appears to be where the procedural scrub was. Rash is small circular blotchy areas. Pt also reports that she got her second covid vaccine yesterday.

## 2021-05-01 NOTE — DISCHARGE INSTRUCTIONS
Please make an appointment to follow up with Your Primary Care Provider in 2-3 days unless symptoms completely resolve.  If your symptoms worsen or you develop a fever 100.4  F or higher please come back to the emergency department.

## 2021-05-02 LAB
ACID FAST STN SPEC QL: NORMAL
ACID FAST STN SPEC QL: NORMAL
BACTERIA SPEC CULT: ABNORMAL
BACTERIA SPEC CULT: ABNORMAL
SPECIMEN SOURCE: ABNORMAL
SPECIMEN SOURCE: NORMAL

## 2021-05-02 NOTE — TELEPHONE ENCOUNTER
[facility] Emergency Department/Urgent Care Lab result notification:    [facility] ED lab result protocol used  Culture     Reason for call  Notify of lab results, assess symptoms,  review ED providers recommendations/discharge instructions (if necessary) and advise per ED lab result f/u protocol    Lab Result   Final FLUID culture report on 5/2/21  [facility] Emergency Dept discharge antibiotic prescribed [if applicable]: Amoxicillin-Clavulanate (Augmentin) 875-125 mg PO tablet, 1 tablet by mouth 2 times daily for 10 days  #1. Bacteria, Cutibacterium (Propionibacterium) acnes, which is [SUSCEPTIBLE] to ED discharge antibiotic.  Recommendations in treatment per [facility] ED Lab Result Culture protocol  Information table from Emergency Dept Provider visit on 4/22/21  Symptoms reported at ED visit (Chief complaint, HPI) See below   ED providers Impression and Plan (applicable information) See below   Miscellaneous information Was re-evaluated in the ED at [facility] yesterday 5/1/21 for rash     RN Assessment (Patient s current Symptoms), include time called.  [Insert Left message here if message left]  10:30AM: Patient returned call. States that she has 3 of her antibiotic tablets left.   Since her ED visit in regards to this result, she has had a biopsy and is waiting for those results. Is wondering if she should get her antibiotic extended.   RN Recommendations/Instructions per [facility] ED lab result protocol  Patient notified of lab result and treatment recommendations.    Reviewed with the patient that her preliminary fluid culture from Dr. Ennis is currently negative to date. Only reviewed the preliminary fluid culture with the patient, she has several other cultures and results in process. She states that Dr. Ennis will be contacting her this week with those results.   Advised to finish out her antibiotic and to contact the clinic tomorrow to give them an update and ask  her antibiotic question.  The patient is comfortable with the information given and has no further questions.     Please Contact your PCP clinic or return to the Emergency department if your:    Symptoms worsen or other concerning symptom's.    PCP follow-up Questions asked: YES       Niru Gaona RN  [facility]  Emergency Dept Lab Result RN  Ph# [phone]     Copy of Lab result   Fluid Culture Aerobic Bacterial  Order: [id number]  Status:  Final result   Visible to patient:  Yes (MyChart) Dx:  Pleural effusion  Specimen Information: Pleural fluid    RIGHT PLEURAL EFFUSION        (important suggestion)  Newer results are available. Click to view them now.   Component 10d ago   Specimen Description Pleural fluid RIGHT PLEURAL EFFUSION    Culture Micro Abnormal   On day 4, isolated in broth only:   Cutibacterium (Propionibacterium) acnes     Culture Micro Critical Value/Significant Value, preliminary result only, called to and read back by   Hamilton Vazquez RN at 1210 on 4/26/21 by LISSETH.    Resulting Agency [facility]   Susceptibility     Cutibacterium (Propionibacterium) acnes     E-TEST     Amoxicillin/Clav <=0.016 ug/mL Sensitive     CEFOTAXIME 0.023 ug/mL Sensitive     CLINDAMYCIN 0.047 ug/mL Sensitive     PENICILLIN <=0.016 ug/mL Sensitive                 Specimen Collected: 04/22/21  4:35 PM Last Resulted: 05/02/21  8:16 AM

## 2021-05-03 ENCOUNTER — TELEPHONE (OUTPATIENT)
Dept: PEDIATRICS | Facility: CLINIC | Age: 41
End: 2021-05-03

## 2021-05-03 DIAGNOSIS — J90 PLEURAL EFFUSION: Primary | ICD-10-CM

## 2021-05-03 LAB
APPEARANCE FLD: NORMAL
BASOPHILS NFR FLD MANUAL: 2 %
COLOR FLD: YELLOW
COPATH REPORT: NORMAL
COPATH REPORT: NORMAL
EOSINOPHIL NFR FLD MANUAL: 28 %
LYMPHOCYTES NFR FLD MANUAL: 36 %
NEUTS BAND NFR FLD MANUAL: 3 %
OTHER CELLS FLD MANUAL: 31 %
SPECIMEN SOURCE FLD: NORMAL
WBC # FLD AUTO: 5587 /UL

## 2021-05-03 RX ORDER — PREDNISONE 20 MG/1
20 TABLET ORAL 2 TIMES DAILY
Qty: 28 TABLET | Refills: 0 | Status: SHIPPED | OUTPATIENT
Start: 2021-05-03 | End: 2021-05-17

## 2021-05-03 NOTE — PROGRESS NOTES
Telephone call     Called and discussed results of pleuroscopy   No evidence of cancer in fluid, chest wall and lung fields  Biopsy shows pleuritis and reactive mesothelial cells   Recommend 40mg prednisone for 2 weeks and repeat clinic visit in 3wks with cxr     Cory Ennis MD   of Medicine  Interventional Pulmonary  Department of Pulmonary, Allergy, Critical Care and Sleep Medicine   [facility]  Pager: [phone]   Office: [phone]  Email: [email or web address]    Jory BUCIO, OCN  Clinical Nurse Specialist  Department of Thoracic Surgery  Office: [phone]  Email: [email or web address]    Maria Teresa Hines  Interventional Pulmonology Surgery Scheduler  Office: [phone]  Email: [email or web address]

## 2021-05-03 NOTE — TELEPHONE ENCOUNTER
"Patient called today with some questions.    1. She wanted to update Dr. Contreras with her recent biopsies she had done, pathology not back yet.    2. She was started on abx in the Emergency room in regards to her pleural effusion, wondering if she needs to continue abx or not. Recommended patient follow up with Dr. Ennis    3. She wanted to see Dr. Contreras sooner than 6/30, assisted with r/s to 6/9.    4. Patient reports she needs to make appt to have sutures removed, states Dr. Ennis said \"sometime this week\" encouraged patient to call Dr. Ennis office to ask about antibiotics and timeframe of getting sutures removed. Advised we can remove sutures here on RN schedule if Dr. Ennis ok with that.    Patient will call Dr. Ennis office, advised ok to continue to send updates via [place] for Dr. Contreras.   "

## 2021-05-04 LAB
BACTERIA SPEC CULT: NO GROWTH
CHYLO FLD QL: NORMAL
SPECIMEN SOURCE: NORMAL

## 2021-05-06 ENCOUNTER — OFFICE VISIT (OUTPATIENT)
Dept: PEDIATRICS | Facility: CLINIC | Age: 41
End: 2021-05-06
Payer: COMMERCIAL

## 2021-05-06 VITALS
HEIGHT: 62 IN | HEART RATE: 70 BPM | SYSTOLIC BLOOD PRESSURE: 132 MMHG | OXYGEN SATURATION: 100 % | WEIGHT: 266.7 LBS | DIASTOLIC BLOOD PRESSURE: 84 MMHG | TEMPERATURE: 96.7 F | BODY MASS INDEX: 49.08 KG/M2 | RESPIRATION RATE: 13 BRPM

## 2021-05-06 DIAGNOSIS — J90 PLEURAL EFFUSION: Primary | ICD-10-CM

## 2021-05-06 DIAGNOSIS — Z48.02 VISIT FOR SUTURE REMOVAL: ICD-10-CM

## 2021-05-06 LAB
BACTERIA SPEC CULT: NORMAL
Lab: NORMAL
SPECIMEN SOURCE: NORMAL

## 2021-05-06 PROCEDURE — 99207 PR NO BILLABLE SERVICE THIS VISIT: CPT | Performed by: NURSE PRACTITIONER

## 2021-05-06 ASSESSMENT — MIFFLIN-ST. JEOR: SCORE: 1837.99

## 2021-05-06 NOTE — PATIENT INSTRUCTIONS
Patient Education     Suture Care    Sutures or stitches are used to close wounds. Sutures also help stop bleeding and speed healing. To help your wound heal, follow the tips on this handout.  Types of sutures  Some sutures need to be removed by a healthcare provider. These are called nonabsorbable sutures. Others dissolve on their own and will not need to be removed These are called absorbable sutures. Sometimes strips of tape, staples, or skin glue (adhesives) are used. You ll be told what kind of sutures you have.  The type of sutures you have are called: ______________________________________  Keep sutures clean    Don't do things that could cause dirt or sweat to get on your sutures. If needed, cover your sutures with a bandage to protect them.    Don t pick at scabs. They help protect the wound.    Don t wash the area around your sutures unless your healthcare provider says it s OK. Then, follow their instructions for washing and drying.    Keep sutures dry    Keep your sutures out of water.    Take a sponge bath to prevent getting your sutures and wound wet, unless your healthcare provider tells you otherwise.    Ask your provider when can you take a shower or bathe.    Ask your provider about the best way to keep your sutures dry when bathing or showering.    If sutures get damp, pat them dry.    Changing your dressing  Leave the dressing in place until you are told to remove it or change it. Change it only as directed, using clean hands:    After the first ___hours, change your dressing every ___hours.    Change your dressing if it gets wet or dirty. Apply antibiotic ointment again if directed by your provider.  Other tips    To help wounds on an arm or leg heal, use the affected limb as little as possible.    To help reduce swelling and throbbing, raise the area with sutures above your heart.    To help prevent itching, cover sutures with gauze. If sutures itch, try not to scratch them.    For pain  relief, try acetaminophen or ibuprofen. Don t use aspirin. It can increase bleeding.    For skin glue or skin tape: Don't pick or scratch the area. The tape or glue will fall out on its own in several days.    For absorbable sutures: These sutures dissolve on their own. Your provider can tell you how long this will take. You don't need to return to have your sutures removed.      For removable sutures or staples: You will need to be seen to have your sutures removed. Call your provider or return to have your sutures removed in ________days.    When to call your healthcare provider  Call your healthcare provider if you notice any of the following signs:    Increased soreness, pain, or tenderness after 24 hours    A red streak, increased redness, or puffiness near the wound    White, yellowish, or bad smelling discharge from the wound    Bleeding that can t be stopped by applying pressure    Adhesive strips fall off or stitches dissolve before the wound heals    Fever of 100.4 F (38.0 C) or higher, or as directed by your provider  Mata last reviewed this educational content on 6/1/2019 2000-2021 [employer]. All rights reserved. This information is not intended as a substitute for professional medical care. Always follow your healthcare professional's instructions.           Patient Education     Stitches or Staple Removal  You were seen today for removal of your stitches or staples. Your wound is healing as expected. The wound has healed well enough that the stitches or staples can be removed. The wound will continue to heal for the next few months.   At this time there is no sign of infection.   Home care    If you have pain, take pain medicine as advised by your healthcare provider.     Keep your wound clean and protected by covering it with a bandage for the next week or so.     Wash your hands with soap and warm water before and after caring for your wound. This helps prevent  "infection.    Clean the wound gently with soap and clean, running water daily or as directed by your healthcare provider. Don't use iodine, alcohol, or other cleansers on the wound.  Gently pat it dry. Put on a new bandage, if needed. Don't reuse bandages.    If the area gets wet, gently pat it dry with a clean cloth. Replace the wet bandage with a dry one.    Check the wound daily for signs of infection. (These are listed under \"When to seek medical advice\" below.)    You may shower and bathe as usual. Swimming may be allowed, but check with your healthcare provider first.    Keep the wound out of prolonged direct sunlight. The new skin is very sensitive and can easily sunburn causing worse scarring.    Ask your provider about using over-the-counter scar removing creams if your wound is highly visible.    Follow-up care  Follow up with your healthcare provider as advised.  When to seek medical advice   Call your healthcare provider if any of the following occur:    Wound reopens or bleeds    Signs of an infection, such as:  ? Increasing redness or swelling around the wound  ? Increased warmth from the wound  ? Worsening pain  ? Red streaking lines away from the wound  ? Fluid draining from the wound    Fever of 100.4 F (38 C) or higher, or as directed by your healthcare provider  [employer] last reviewed this educational content on 4/1/2018 2000-2021 [employer]. All rights reserved. This information is not intended as a substitute for professional medical care. Always follow your healthcare professional's instructions.           "

## 2021-05-06 NOTE — PROGRESS NOTES
"Assessment & Plan     Pleural effusion  Sutures removed  Follow-up as needed  Tylenol as needed for discomfort or pain    - REMOVAL OF SUTURES    Visit for suture removal  Sutures removed  Follow-up as needed    - REMOVAL OF SUTURES    I spent a total of 10 minutes on the day of the visit.   Time spent doing chart review, history and exam, documentation and further activities per the note         See Patient Instructions  Return if symptoms worsen or fail to improve.    Keila Sheikh NP  [facility] ERMA John is a 40 year old who presents for the following health issues:     HPI     Patient here for suture removal.  Pleuroscopy and pleural biopsies performed 4/29/2021.  Denies pain, redness, swelling, discharge.  Denies fever    Review of Systems   Constitutional, HEENT, cardiovascular, pulmonary, gi and gu systems are negative, except as otherwise noted.      Objective    /84 (BP Location: Right arm, Patient Position: Chair, Cuff Size: Adult Large)   Pulse 70   Temp 96.7  F (35.9  C) (Tympanic)   Resp 13   Ht 1.575 m (5' 2\")   Wt 121 kg (266 lb 11.2 oz)   SpO2 100%   BMI 48.78 kg/m    Body mass index is 48.78 kg/m .       Physical Exam   GENERAL: healthy, alert and no distress  SKIN: no suspicious lesions or rashes  SKIN: 5 sutures removed from right thoracic back.  No discharge, redness, pain, swelling, dehiscence.           "

## 2021-05-13 ENCOUNTER — TRANSFERRED RECORDS (OUTPATIENT)
Dept: HEALTH INFORMATION MANAGEMENT | Facility: CLINIC | Age: 41
End: 2021-05-13

## 2021-05-13 LAB
BACTERIA SPEC CULT: NORMAL
Lab: NORMAL
SPECIMEN SOURCE: NORMAL

## 2021-05-14 ENCOUNTER — TELEPHONE (OUTPATIENT)
Dept: PEDIATRICS | Facility: CLINIC | Age: 41
End: 2021-05-14

## 2021-05-14 ENCOUNTER — ANCILLARY PROCEDURE (OUTPATIENT)
Dept: CT IMAGING | Facility: CLINIC | Age: 41
End: 2021-05-14
Attending: INTERNAL MEDICINE
Payer: COMMERCIAL

## 2021-05-14 ENCOUNTER — VIRTUAL VISIT (OUTPATIENT)
Dept: PEDIATRICS | Facility: CLINIC | Age: 41
End: 2021-05-14
Payer: COMMERCIAL

## 2021-05-14 ENCOUNTER — TELEPHONE (OUTPATIENT)
Dept: NURSING | Facility: CLINIC | Age: 41
End: 2021-05-14

## 2021-05-14 ENCOUNTER — ANCILLARY PROCEDURE (OUTPATIENT)
Dept: GENERAL RADIOLOGY | Facility: CLINIC | Age: 41
End: 2021-05-14
Attending: INTERNAL MEDICINE
Payer: COMMERCIAL

## 2021-05-14 DIAGNOSIS — E88.09 HYPOALBUMINEMIA: ICD-10-CM

## 2021-05-14 DIAGNOSIS — R93.5 ABNORMAL CT OF THE ABDOMEN: ICD-10-CM

## 2021-05-14 DIAGNOSIS — B00.9 HSV-2 (HERPES SIMPLEX VIRUS 2) INFECTION: Primary | ICD-10-CM

## 2021-05-14 DIAGNOSIS — J90 PLEURAL EFFUSION: ICD-10-CM

## 2021-05-14 DIAGNOSIS — R19.7 DIARRHEA, UNSPECIFIED: ICD-10-CM

## 2021-05-14 PROCEDURE — 99213 OFFICE O/P EST LOW 20 MIN: CPT | Mod: 95 | Performed by: NURSE PRACTITIONER

## 2021-05-14 PROCEDURE — 71046 X-RAY EXAM CHEST 2 VIEWS: CPT | Mod: GC | Performed by: RADIOLOGY

## 2021-05-14 PROCEDURE — 74177 CT ABD & PELVIS W/CONTRAST: CPT | Performed by: RADIOLOGY

## 2021-05-14 RX ORDER — IOPAMIDOL 755 MG/ML
135 INJECTION, SOLUTION INTRAVASCULAR ONCE
Status: COMPLETED | OUTPATIENT
Start: 2021-05-14 | End: 2021-05-14

## 2021-05-14 RX ORDER — VALACYCLOVIR HYDROCHLORIDE 500 MG/1
500 TABLET, FILM COATED ORAL DAILY
Qty: 90 TABLET | Refills: 0 | Status: SHIPPED | OUTPATIENT
Start: 2021-05-14 | End: 2021-09-29

## 2021-05-14 RX ADMIN — IOPAMIDOL 135 ML: 755 INJECTION, SOLUTION INTRAVASCULAR at 09:52

## 2021-05-14 NOTE — TELEPHONE ENCOUNTER
Patient calling with request for chest xray results from this morning. Reviewed with nothing found. Advised the chest xray came back clear. Nothing further needed at this time.     Tamiko Mayer RN 05/14/21 10:28 AM    Health Triage Nurse Advisor

## 2021-05-14 NOTE — PATIENT INSTRUCTIONS
Tony John,     It was great talking with you today!    Your medication is at the pharmacy.    Please let me know if you have any questions,    Keila Sheikh, GARRY  Family Medicine

## 2021-05-14 NOTE — TELEPHONE ENCOUNTER
The patient called in requesting a prescription for medication to treat a genital herpes outbreak.  There is no current active medication on her medication list.  She reports the last outbreak that she had was over 6 years ago. She thinks that her current outbreak is being triggered by the prednisone that she is taking.  She is requesting a prescription.  Advised the patient because this medication is not currently prescribed to her she will need a visit of some kind.  Patient would prefer to have a telephone visit for this.  I did schedule her for today with extender.     Brooklyn Angulo RN   [facility] -- Triage Nurse

## 2021-05-14 NOTE — PROGRESS NOTES
Dora is a 41 year old who is being evaluated via a billable telephone visit.      What phone number would you like to be contacted at? [phone]  How would you like to obtain your AVS? MyChart    Assessment & Plan     HSV-2 (herpes simplex virus 2) infection  Refilled medication  Patient to take for outbreak and daily suppression  - valACYclovir (VALTREX) 500 MG tablet; Take 1 tablet (500 mg) by mouth daily      I spent a total of 12 minutes on the day of the visit.   Time spent doing chart review, history and exam, documentation and further activities per the note         See Patient Instructions  No follow-ups on file.    Keila Sehikh NP  [facility] ERMA    Subjective   Dora is a 41 year old who presents for the following health issues:     HPI     Herpes:  -History of HSV2  -Currently having an outbreak  -Would like suppression for a few month due to current health issues.  Patient would like to       Review of Systems   Constitutional, HEENT, cardiovascular, pulmonary, gi and gu systems are negative, except as otherwise noted.      Objective           Vitals:  No vitals were obtained today due to virtual visit.    Physical Exam   healthy, alert and no distress  PSYCH: Alert and oriented times 3; coherent speech, normal   rate and volume, able to articulate logical thoughts, able   to abstract reason, no tangential thoughts, no hallucinations   or delusions  Her affect is normal  RESP: No cough, no audible wheezing, able to talk in full sentences  Remainder of exam unable to be completed due to telephone visits          Phone call duration: 12 minutes

## 2021-05-18 ENCOUNTER — VIRTUAL VISIT (OUTPATIENT)
Dept: PULMONOLOGY | Facility: CLINIC | Age: 41
End: 2021-05-18
Attending: INTERNAL MEDICINE
Payer: COMMERCIAL

## 2021-05-18 DIAGNOSIS — J90 PLEURAL EFFUSION: Primary | ICD-10-CM

## 2021-05-18 PROCEDURE — 99214 OFFICE O/P EST MOD 30 MIN: CPT | Mod: 95 | Performed by: INTERNAL MEDICINE

## 2021-05-18 NOTE — LETTER
"5/18/2021       RE: Dora Barrera  [address] [place] [address]     Dear Colleague,    Thank you for referring your patient, Dora Barrera, to the [facility] at [facility]. Please see a copy of my visit note below.    Dora is a 41 year old who is being evaluated via a billable video visit.      How would you like to obtain your AVS? MyChart  If the video visit is dropped, the invitation should be resent by: Text to cell phone: [phone]  Will anyone else be joining your video visit? No      Tele 25min    LUNG NODULE & INTERVENTIONAL PULMONARY CLINIC  [facility] [place], [facility]   VIRTUAL TELEPHONE VISIT    Dora Barrera MRN# [id number]   Age: 41 year old YOB: 1980     Reason for Consultation: lung abnormality     Dora Barrera is a 41 year old female who is being evaluated via a billable telephone visit.      The patient has been notified of following: \"This telephone visit will be conducted via a call between you and your physician/provider. We have found that certain health care needs can be provided without the need for a physical exam.  This service lets us provide the care you need with a short phone conversation.  If a prescription is necessary we can send it directly to your pharmacy.  If lab work is needed we can place an order for that and you can then stop by our lab to have the test done at a later time. Telephone visits are billed at different rates depending on your insurance coverage. During this emergency period, for some insurers they may be billed the same as an in-person visit.  Please reach out to your insurance provider with any questions. If during the course of the call the physician/provider feels a telephone visit is not appropriate, you will not be charged for this service.\"    Patient has given verbal consent for " Telephone visit?  Yes    How would you like to obtain your AVS? MyChart    Phone call duration: 25 minutes    Additional provider notes: see below     Assessment and Plan:    1. Resolved right pleural effusion. Pleural fluid analysis and biopsies consistent with acute pleuritis of unknown etiology. It is possible this could have been 2/2 covid-19 vaccine response however would only be speculation. Interestingly, she also had a systemic allergic response to the 2nd dose as well. At this point, I would consider this to be an isolated event. Follow-up prn.     Billing: I spent 30 minutes including face to face, chart review, personal and documented interpretation of results, counseling and coordination of care about the issues above.      Cory Ennis MD   of Medicine  Interventional Pulmonology  Department of Pulmonary, Allergy, Critical Care and Sleep Medicine   [facility] [employer] [address]  Pager: [phone]          History:     Dora Barrera is a 40 year old female with sig h/o for anxiety, GERD, HTN who is here for evaluation/followup of pleural effusion.     - No new resp sx or complaints. Denies dyspnea or cough.   - had persistent pleural effusion s/p pleural biopsies. Pleural fluid was exudative with pleural biopsies demonstrating inflammation. She finished 2wk course of prednisone and now feels back to normal.   - Personal hx of cancer: No  - Family hx of cancer: Mom had kidney cancer. Dad has prostate cancer.   - Exposure hx: Denies asbestos or radon exposure   - Tobacco hx: Never smoker   - My interpretation of the images relevant for this visit includes: right pleural fluid    - My interpretation of the PFT's relevant for this visit includes: None      Imaging  Repeat CXR with resolved right pleural effusion     Other active medical problems include:   - Has HTN and GERD. Stable.            Past Medical History:      Past Medical History:   Diagnosis Date     Anxiety       Gastroesophageal reflux disease     UGI:09/09/20, Re-evaluate in 2 months.     Hypertension      Status post endoscopy 09/08/2020    Mild GERD and low protein.           Past Surgical History:      Past Surgical History:   Procedure Laterality Date     APPENDECTOMY      '97 Incidental with another procedure.     BRONCHOSCOPY FLEXIBLE N/A 4/29/2021    Procedure: Flexible bronchoscopy, airway exam and possible biopsies;  Surgeon: Cory Ennis MD;  Location: [facility] OR     CHOLECYSTECTOMY      2009     EXCISE MASS TRUNK Right 9/21/2020    Procedure: EXCISION RIGHT BREAST SKIN CYST;  Surgeon: Fay Mcgraw MD;  Location: [facility] OR     GYN SURGERY      Rt. ovary removed.'94, L' Ov.cyst removed '97     HC ASPIRATION OF OVARIAN CYST Left      OOPHORECTOMY Right     Ovarian cyst, torsion     PLEUROSCOPY Right 4/29/2021    Procedure: PLEUROSCOPY and pleural biopsies;  Surgeon: Cory Ennis MD;  Location: [facility] OR          Social History:     Social History     Tobacco Use     Smoking status: Never Smoker     Smokeless tobacco: Never Used   Substance Use Topics     Alcohol use: Yes     Comment: occ          Family History:     Family History   Problem Relation Age of Onset     Hypertension Mother      Cancer Mother 72        dx with kidney cancer - stage 4     Heart Disease Father      Diabetes Father      Hypertension Father      Colon Cancer Maternal Grandfather         in his 70s     Breast Cancer No family hx of            Allergies:    No Known Allergies       Medications:     Current Outpatient Medications   Medication Sig     acetaminophen (TYLENOL) 500 MG tablet Take 500-1,000 mg by mouth every 6 hours as needed for mild pain     escitalopram (LEXAPRO) 20 MG tablet Take 1 tablet (20 mg) by mouth daily     lisinopril (ZESTRIL) 20 MG tablet Take 1.5 tablets (30 mg) by mouth daily     norgestimate-ethinyl estradiol (SPRINTEC 28) 0.25-35 MG-MCG tablet Take 1 tablet by mouth daily Continuously.     omeprazole (PRILOSEC) 20  MG DR capsule Take 20 mg by mouth 2 times daily     valACYclovir (VALTREX) 500 MG tablet Take 1 tablet (500 mg) by mouth daily     No current facility-administered medications for this visit.           Review of Systems:     CONSTITUTIONAL: negative for fever, chills, change in weight  INTEGUMENTARY/SKIN: no rash or obvious new lesions  ENT/MOUTH: no sore throat, new sinus pain or nasal drainage  RESP: see interval history  CV: negative for chest pain, palpitations or peripheral edema  GI: no nausea, vomiting, change in stools  : no dysuria  MUSCULOSKELETAL: no myalgias, arthralgias  ENDOCRINE: blood sugars with adequate control  PSYCHIATRIC: mood stable  LYMPHATIC: no new lymphadenopathy  HEME: no bleeding or easy bruisability  NEURO: no numbness, weakness, headaches         Physical Exam:      A comprehensive physical examination is deferred due to [facility] (public health emergency) telephone visit restrictions.         Current Laboratory Data:   All laboratory and imaging data reviewed.    No results found for this or any previous visit (from the past 24 hour(s)).         Previous Pulmonary Function Testing   No results found for: 20001  No results found for: 20002  No results found for: 17138  No results found for: 20015  No results found for: 07071  No results found for: 20027  No results found for: 20028  No results found for: 20029  No results found for: 20079  No results found for: 20080  No results found for: 20081  No results found for: 20335  No results found for: 20105  No results found for: 20053  No results found for: 20054  No results found for: 20055         Previous Chest Imaging   No images are attached to the encounter.  No images are attached to the encounter or orders placed in the encounter.         Previous Cardiology Imaging   No results found for this or any previous visit (from the past 8760 hour(s)).                   Again, thank you for allowing me to participate in the care of your  patient.      Sincerely,    Cory Ennis MD

## 2021-05-18 NOTE — PROGRESS NOTES
"LUNG NODULE & INTERVENTIONAL PULMONARY CLINIC  CLINICS & SURGERY CENTER, [facility], [facility]   VIRTUAL TELEPHONE VISIT    Dora Barrera MRN# [id number]   Age: 41 year old YOB: 1980     Reason for Consultation: lung abnormality     Dora Barrera is a 41 year old female who is being evaluated via a billable telephone visit.      The patient has been notified of following: \"This telephone visit will be conducted via a call between you and your physician/provider. We have found that certain health care needs can be provided without the need for a physical exam.  This service lets us provide the care you need with a short phone conversation.  If a prescription is necessary we can send it directly to your pharmacy.  If lab work is needed we can place an order for that and you can then stop by our lab to have the test done at a later time. Telephone visits are billed at different rates depending on your insurance coverage. During this emergency period, for some insurers they may be billed the same as an in-person visit.  Please reach out to your insurance provider with any questions. If during the course of the call the physician/provider feels a telephone visit is not appropriate, you will not be charged for this service.\"    Patient has given verbal consent for Telephone visit?  Yes    How would you like to obtain your AVS? Yamileth    Phone call duration: 25 minutes    Additional provider notes: see below     Assessment and Plan:    1. Resolved right pleural effusion. Pleural fluid analysis and biopsies consistent with acute pleuritis of unknown etiology. It is possible this could have been 2/2 covid-19 vaccine response however would only be speculation. Interestingly, she also had a systemic allergic response to the 2nd dose as well. At this point, I would consider this to be an isolated event. Follow-up prn.     Billing: I spent 30 minutes including face to face, " chart review, personal and documented interpretation of results, counseling and coordination of care about the issues above.      Cory Ennis MD   of Medicine  Interventional Pulmonology  Department of Pulmonary, Allergy, Critical Care and Sleep Medicine   [facility] [email or web address]  Pager: [phone]          History:     Dora Barrera is a 40 year old female with sig h/o for anxiety, GERD, HTN who is here for evaluation/followup of pleural effusion.     - No new resp sx or complaints. Denies dyspnea or cough.   - had persistent pleural effusion s/p pleural biopsies. Pleural fluid was exudative with pleural biopsies demonstrating inflammation. She finished 2wk course of prednisone and now feels back to normal.   - Personal hx of cancer: No  - Family hx of cancer: Mom had kidney cancer. Dad has prostate cancer.   - Exposure hx: Denies asbestos or radon exposure   - Tobacco hx: Never smoker   - My interpretation of the images relevant for this visit includes: right pleural fluid    - My interpretation of the PFT's relevant for this visit includes: None      Imaging  Repeat CXR with resolved right pleural effusion     Other active medical problems include:   - Has HTN and GERD. Stable.            Past Medical History:      Past Medical History:   Diagnosis Date     Anxiety      Gastroesophageal reflux disease     UGI:09/09/20, Re-evaluate in 2 months.     Hypertension      Status post endoscopy 09/08/2020    Mild GERD and low protein.           Past Surgical History:      Past Surgical History:   Procedure Laterality Date     APPENDECTOMY      '97 Incidental with another procedure.     BRONCHOSCOPY FLEXIBLE N/A 4/29/2021    Procedure: Flexible bronchoscopy, airway exam and possible biopsies;  Surgeon: Cory Ennis MD;  Location: [facility] OR     CHOLECYSTECTOMY      2009     EXCISE MASS TRUNK Right 9/21/2020    Procedure: EXCISION RIGHT BREAST SKIN CYST;  Surgeon: Fay Mcgraw MD;   Location: [facility] OR     GYN SURGERY      Rt. ovary removed.'94, L' Ov.cyst removed '97     HC ASPIRATION OF OVARIAN CYST Left      OOPHORECTOMY Right     Ovarian cyst, torsion     PLEUROSCOPY Right 4/29/2021    Procedure: PLEUROSCOPY and pleural biopsies;  Surgeon: Cory Ennis MD;  Location:  OR          Social History:     Social History     Tobacco Use     Smoking status: Never Smoker     Smokeless tobacco: Never Used   Substance Use Topics     Alcohol use: Yes     Comment: occ          Family History:     Family History   Problem Relation Age of Onset     Hypertension Mother      Cancer Mother 72        dx with kidney cancer - stage 4     Heart Disease Father      Diabetes Father      Hypertension Father      Colon Cancer Maternal Grandfather         in his 70s     Breast Cancer No family hx of            Allergies:    No Known Allergies       Medications:     Current Outpatient Medications   Medication Sig     acetaminophen (TYLENOL) 500 MG tablet Take 500-1,000 mg by mouth every 6 hours as needed for mild pain     escitalopram (LEXAPRO) 20 MG tablet Take 1 tablet (20 mg) by mouth daily     lisinopril (ZESTRIL) 20 MG tablet Take 1.5 tablets (30 mg) by mouth daily     norgestimate-ethinyl estradiol (SPRINTEC 28) 0.25-35 MG-MCG tablet Take 1 tablet by mouth daily Continuously.     omeprazole (PRILOSEC) 20 MG DR capsule Take 20 mg by mouth 2 times daily     valACYclovir (VALTREX) 500 MG tablet Take 1 tablet (500 mg) by mouth daily     No current facility-administered medications for this visit.           Review of Systems:     CONSTITUTIONAL: negative for fever, chills, change in weight  INTEGUMENTARY/SKIN: no rash or obvious new lesions  ENT/MOUTH: no sore throat, new sinus pain or nasal drainage  RESP: see interval history  CV: negative for chest pain, palpitations or peripheral edema  GI: no nausea, vomiting, change in stools  : no dysuria  MUSCULOSKELETAL: no myalgias, arthralgias  ENDOCRINE: blood sugars  with adequate control  PSYCHIATRIC: mood stable  LYMPHATIC: no new lymphadenopathy  HEME: no bleeding or easy bruisability  NEURO: no numbness, weakness, headaches         Physical Exam:      A comprehensive physical examination is deferred due to [facility] (public health emergency) telephone visit restrictions.         Current Laboratory Data:   All laboratory and imaging data reviewed.    No results found for this or any previous visit (from the past 24 hour(s)).         Previous Pulmonary Function Testing   No results found for: 20001  No results found for: 20002  No results found for: 20003  No results found for: 20015  No results found for: 20016  No results found for: 20027  No results found for: 20028  No results found for: 20029  No results found for: 20079  No results found for: 20080  No results found for: 20081  No results found for: 20335  No results found for: 20105  No results found for: 20053  No results found for: 20054  No results found for: 20055         Previous Chest Imaging   No images are attached to the encounter.  No images are attached to the encounter or orders placed in the encounter.         Previous Cardiology Imaging   No results found for this or any previous visit (from the past 8760 hour(s)).

## 2021-05-18 NOTE — PROGRESS NOTES
Dora is a 41 year old who is being evaluated via a billable video visit.      How would you like to obtain your AVS? MyChart  If the video visit is dropped, the invitation should be resent by: Text to cell phone: [phone]  Will anyone else be joining your video visit? No      Tele 25min

## 2021-05-19 ENCOUNTER — MEDICAL CORRESPONDENCE (OUTPATIENT)
Dept: HEALTH INFORMATION MANAGEMENT | Facility: CLINIC | Age: 41
End: 2021-05-19

## 2021-05-20 ENCOUNTER — OFFICE VISIT (OUTPATIENT)
Dept: URGENT CARE | Facility: URGENT CARE | Age: 41
End: 2021-05-20
Payer: COMMERCIAL

## 2021-05-20 VITALS
DIASTOLIC BLOOD PRESSURE: 96 MMHG | SYSTOLIC BLOOD PRESSURE: 149 MMHG | OXYGEN SATURATION: 98 % | TEMPERATURE: 98 F | HEART RATE: 74 BPM | RESPIRATION RATE: 20 BRPM

## 2021-05-20 DIAGNOSIS — T81.30XA WOUND DEHISCENCE: Primary | ICD-10-CM

## 2021-05-20 DIAGNOSIS — I10 BENIGN ESSENTIAL HYPERTENSION: ICD-10-CM

## 2021-05-20 PROCEDURE — 99214 OFFICE O/P EST MOD 30 MIN: CPT | Performed by: PHYSICIAN ASSISTANT

## 2021-05-20 PROCEDURE — 87070 CULTURE OTHR SPECIMN AEROBIC: CPT | Performed by: PHYSICIAN ASSISTANT

## 2021-05-20 RX ORDER — CEPHALEXIN 500 MG/1
500 CAPSULE ORAL 4 TIMES DAILY
Qty: 28 CAPSULE | Refills: 0 | Status: SHIPPED | OUTPATIENT
Start: 2021-05-20 | End: 2021-05-27

## 2021-05-20 NOTE — PATIENT INSTRUCTIONS
Contact your surgeon/pulmonologist to see if they want to evaluate you in clinic.  Keep wound clean with soap and water and change dressing at least once daily, or if dressing gets wet/dirty.    If worsening redness, drainage, or pain after 48 hrs of antibiotics, be seen again by primary care or urgent care if not at surgeon's office.    Follow up with primary care for blood pressure recheck in 2 weeks.

## 2021-05-20 NOTE — PROGRESS NOTES
Assessment/Plan:    Post-op wound with dehiscence. No signs of significant infection at this point, although possible given small amount of yellowish exudate present in the wound. Rx Keflex. Wound culture done. Dressing applied. Wound care discussed. Advised she contact surgeon's office for follow-up.     BP elevated today; she is asymptomatic. Advised f/u with PCP for recheck in 2 weeks. Pt appears anxious and notes having a stressful month, this may be playing a part in the elevated BP.    See patient instructions below.    At the end of the encounter, I discussed results, diagnosis, medications. Discussed red flags for immediate return to clinic/ER, as well as indications for follow up if no improvement. Patient understood and agreed to plan. Patient was stable for discharge.      ICD-10-CM    1. Wound dehiscence  T81.30XA Wound Culture Aerobic Bacterial GICH (FUTURE)     cephALEXin (KEFLEX) 500 MG capsule   2. Benign essential hypertension  I10          Return in about 3 days (around 5/23/2021) for follow up with pulmonologist/surgeon.    ARACELIS Queen, [facility] ERMA  -----------------------------------------------------------------------------------------------------------------------------------------------------    HPI:  Dora Barrera is a 41 year old female with history of HTN who presents for wound check. She had pleuroscopy, flexible bronchoscopy, airway exam, and pleural biopsies on 4/29 at [facility] (Dr. Cory Ennis with [facility]) due to pleural effusion. She went to a 1 week f/u appt with the surgeon, had stitches removed, and reports wound was healing well at that time. She states she noticed the wound felt irritated yesterday and noticed it had opened up. There is mild redness around the wound but that has been present since the surgery. She reports sitting in a hot tub 5 days ago. She does not report fever/chills or  purulent drainage from the wound.    She is taking her BP meds.    Past Medical History:   Diagnosis Date     Anxiety      Gastroesophageal reflux disease     UGI:09/09/20, Re-evaluate in 2 months.     Hypertension      Status post endoscopy 09/08/2020    Mild GERD and low protein.       Vitals:    05/20/21 1820   BP: (!) 149/96   Pulse: 74   Resp: 20   Temp: 98  F (36.7  C)   TempSrc: Tympanic   SpO2: 98%       Physical Exam  Vitals signs and nursing note reviewed.   Pulmonary:      Effort: Pulmonary effort is normal.   Musculoskeletal:        Back:    Neurological:      Mental Status: She is alert.         Labs/Imaging:  No results found for this or any previous visit (from the past 24 hour(s)).      Patient Instructions   Contact your surgeon/pulmonologist to see if they want to evaluate you in clinic.  Keep wound clean with soap and water and change dressing at least once daily, or if dressing gets wet/dirty.    If worsening redness, drainage, or pain after 48 hrs of antibiotics, be seen again by primary care or urgent care if not at surgeon's office.    Follow up with primary care for blood pressure recheck in 2 weeks.

## 2021-05-24 LAB
BACTERIA SPEC CULT: NO GROWTH
Lab: NORMAL
SPECIMEN SOURCE: NORMAL

## 2021-05-25 ENCOUNTER — TELEPHONE (OUTPATIENT)
Dept: URGENT CARE | Facility: URGENT CARE | Age: 41
End: 2021-05-25

## 2021-05-25 ENCOUNTER — TELEPHONE (OUTPATIENT)
Dept: SURGERY | Facility: CLINIC | Age: 41
End: 2021-05-25

## 2021-05-25 NOTE — TELEPHONE ENCOUNTER
Called and left message relaying provider's instructions to keep taking medication as prescribed and follow up with PCP for further or worsening sx.    HOANG Payne  10:41 AM 5/25/2021

## 2021-05-25 NOTE — CONFIDENTIAL NOTE
I called Dora to get more information about her concern with an incision from a right chest wall pleuroscopy a few weeks ago with Dr Ennis.    She said all was going well and it seemed to be healing well, had sutures removed last week.   She went to Urgent Care 5/20 when edges opened and she was concerned.  There was no obvious infection but a wound culture was done and Rx for Keflex given.   She had a small amount of yellowish exudate in the wound bed.    Today she said the drainage has decreased and she just wears a bandaid.   The incision is at her bra line so she has been avoiding wearing one.   She is nearly done with antibiotics.   She asked about re-suturing this closed, wondered how it will heal.    I offered to see her tomorrow in clinic to assess and advise.   Appt made at 9:20 tomorrow.

## 2021-05-25 NOTE — TELEPHONE ENCOUNTER
Patient calling stating she was seen in UC last week and just got the results of the wound culture back. Patient stated it looked to her like the results state there is no infection. Patient wanted to speak to someone to see if that is correct and if so, should she stop taking the antibiotic. Please advise and call patient at [phone].    Bryanna Chand,

## 2021-05-26 ENCOUNTER — OFFICE VISIT (OUTPATIENT)
Dept: SURGERY | Facility: CLINIC | Age: 41
End: 2021-05-26
Attending: CLINICAL NURSE SPECIALIST
Payer: COMMERCIAL

## 2021-05-26 VITALS
RESPIRATION RATE: 19 BRPM | HEART RATE: 103 BPM | TEMPERATURE: 99 F | WEIGHT: 265.4 LBS | BODY MASS INDEX: 48.54 KG/M2 | SYSTOLIC BLOOD PRESSURE: 161 MMHG | OXYGEN SATURATION: 96 % | DIASTOLIC BLOOD PRESSURE: 111 MMHG

## 2021-05-26 DIAGNOSIS — Z51.89 VISIT FOR WOUND CHECK: Primary | ICD-10-CM

## 2021-05-26 PROCEDURE — G0463 HOSPITAL OUTPT CLINIC VISIT: HCPCS

## 2021-05-26 PROCEDURE — 99202 OFFICE O/P NEW SF 15 MIN: CPT | Performed by: CLINICAL NURSE SPECIALIST

## 2021-05-26 ASSESSMENT — PAIN SCALES - GENERAL: PAINLEVEL: NO PAIN (0)

## 2021-05-26 NOTE — LETTER
5/26/2021         RE: Dora Barrera  [address] [place] [address]        Dear Colleague,    Thank you for referring your patient, Dora Barrera, to the [facility]. Please see a copy of my visit note below.    REASON FOR VISIT:  Wound assessment    PROCEDURES PERFORMED:  A flexible bronchoscopy  Airway exam  Medical Pleuroscopy (right chest)  Pleural biopsy (right chest)  Therapeutic suctioning (1 sites)  Thoracentesis (right chest)  Chest ultrasound interpretation    DATE ABOVE PROCEDURES PERFORMED:  4/29/21    SURGEON:      Dr. Cory Ennis    History of Present Illness:  Recurrent pleural effusion    Assessment:  Patient is concerned about her surgical incision opening and wanted to be sure it was not infected.   She has a bandage on right posterior chest wall.   Upon removal, there is a scant amount of villarreal drainage on dressing.   The incision is gaping slightly, wound bed appears healthy.   There is no purulent drainage elicited, no erythema of surrounding tissue.   The wound culture done by Urgent Care is negative.       I discussed that the wound would heal from inside out and a small divet may remain.   I told her sutures placed now would not results in closure of the wound as the edges are dry.   I told her to stop the antiobiotics which have been causing her some GI upset.    All questions anwered.        Plan:  I advised her to keep her scheduled appointment with her primary provider/internist and to discuss her elevated B/P with her again.  Return PRN.    Total time:  25 minutes  MAURICE Leyva, CNS      Again, thank you for allowing me to participate in the care of your patient.        Sincerely,        MAURICE Colon CNS

## 2021-05-26 NOTE — PROGRESS NOTES
REASON FOR VISIT:  Wound assessment    PROCEDURES PERFORMED:  A flexible bronchoscopy  Airway exam  Medical Pleuroscopy (right chest)  Pleural biopsy (right chest)  Therapeutic suctioning (1 sites)  Thoracentesis (right chest)  Chest ultrasound interpretation    DATE ABOVE PROCEDURES PERFORMED:  4/29/21    SURGEON:      Dr. Cory Ennis    History of Present Illness:  Recurrent pleural effusion    Assessment:  Patient is concerned about her surgical incision opening and wanted to be sure it was not infected.   She has a bandage on right posterior chest wall.   Upon removal, there is a scant amount of villarreal drainage on dressing.   The incision is gaping slightly, wound bed appears healthy.   There is no purulent drainage elicited, no erythema of surrounding tissue.   The wound culture done by Urgent Care is negative.       I discussed that the wound would heal from inside out and a small divet may remain.   I told her sutures placed now would not results in closure of the wound as the edges are dry.   I told her to stop the antiobiotics which have been causing her some GI upset.    All questions anwered.        Plan:  I advised her to keep her scheduled appointment with her primary provider/internist and to discuss her elevated B/P with her again.  Return PRN.    Total time:  25 minutes  MAURICE Leyva, CNS

## 2021-05-27 ENCOUNTER — TRANSFERRED RECORDS (OUTPATIENT)
Dept: HEALTH INFORMATION MANAGEMENT | Facility: CLINIC | Age: 41
End: 2021-05-27

## 2021-05-27 LAB
FUNGUS SPEC CULT: NORMAL
SPECIMEN SOURCE: NORMAL

## 2021-06-03 ENCOUNTER — TRANSFERRED RECORDS (OUTPATIENT)
Dept: HEALTH INFORMATION MANAGEMENT | Facility: CLINIC | Age: 41
End: 2021-06-03

## 2021-06-05 DIAGNOSIS — I10 BENIGN ESSENTIAL HYPERTENSION: ICD-10-CM

## 2021-06-08 ENCOUNTER — MYC MEDICAL ADVICE (OUTPATIENT)
Dept: PEDIATRICS | Facility: CLINIC | Age: 41
End: 2021-06-08

## 2021-06-08 RX ORDER — LISINOPRIL 20 MG/1
TABLET ORAL
Qty: 90 TABLET | Refills: 0 | OUTPATIENT
Start: 2021-06-08

## 2021-06-08 NOTE — TELEPHONE ENCOUNTER
Has appointment today for BP, routed FYI to PCP, please refill at visit  Milagros Haley RN, BSN  Message handled by CLINIC NURSE.

## 2021-06-09 ENCOUNTER — TELEPHONE (OUTPATIENT)
Dept: ULTRASOUND IMAGING | Facility: CLINIC | Age: 41
End: 2021-06-09

## 2021-06-09 ENCOUNTER — OFFICE VISIT (OUTPATIENT)
Dept: PEDIATRICS | Facility: CLINIC | Age: 41
End: 2021-06-09
Payer: COMMERCIAL

## 2021-06-09 VITALS
RESPIRATION RATE: 18 BRPM | WEIGHT: 266.8 LBS | TEMPERATURE: 98.4 F | BODY MASS INDEX: 48.8 KG/M2 | DIASTOLIC BLOOD PRESSURE: 90 MMHG | HEART RATE: 84 BPM | OXYGEN SATURATION: 97 % | SYSTOLIC BLOOD PRESSURE: 142 MMHG

## 2021-06-09 DIAGNOSIS — M79.89 SOFT TISSUE MASS: ICD-10-CM

## 2021-06-09 DIAGNOSIS — Z11.59 ENCOUNTER FOR SCREENING FOR OTHER VIRAL DISEASES: ICD-10-CM

## 2021-06-09 DIAGNOSIS — Z30.41 ENCOUNTER FOR SURVEILLANCE OF CONTRACEPTIVE PILLS: ICD-10-CM

## 2021-06-09 DIAGNOSIS — K90.49 PROTEIN LOSING ENTEROPATHY: ICD-10-CM

## 2021-06-09 DIAGNOSIS — I10 BENIGN ESSENTIAL HYPERTENSION: Primary | ICD-10-CM

## 2021-06-09 DIAGNOSIS — J90 PLEURAL EFFUSION: ICD-10-CM

## 2021-06-09 DIAGNOSIS — E66.01 MORBID OBESITY (H): ICD-10-CM

## 2021-06-09 PROCEDURE — 99214 OFFICE O/P EST MOD 30 MIN: CPT | Performed by: INTERNAL MEDICINE

## 2021-06-09 RX ORDER — NORGESTIMATE AND ETHINYL ESTRADIOL 0.25-0.035
1 KIT ORAL DAILY
Qty: 84 TABLET | Refills: 1 | Status: SHIPPED | OUTPATIENT
Start: 2021-06-09 | End: 2021-09-29

## 2021-06-09 RX ORDER — LISINOPRIL AND HYDROCHLOROTHIAZIDE 12.5; 2 MG/1; MG/1
1 TABLET ORAL DAILY
Qty: 90 TABLET | Refills: 0 | Status: SHIPPED | OUTPATIENT
Start: 2021-06-09 | End: 2021-08-20

## 2021-06-09 RX ORDER — LISINOPRIL AND HYDROCHLOROTHIAZIDE 12.5; 2 MG/1; MG/1
1 TABLET ORAL DAILY
Status: CANCELLED | OUTPATIENT
Start: 2021-06-09

## 2021-06-09 NOTE — TELEPHONE ENCOUNTER
Order for perihepatic mass biopsy reviewed and approved per Dr. Howard with CT guidance and conscious sedation. Patient would need a current history and physical on file prior to procedure. Patient is on no blood thinning medications.

## 2021-06-09 NOTE — PROGRESS NOTES
Assessment & Plan     (I10) Benign essential hypertension  (primary encounter diagnosis)  BP Readings from Last 6 Encounters:   06/09/21 (!) 142/90   05/26/21 (!) 161/111   05/20/21 (!) 149/96   05/06/21 132/84   05/01/21 (!) 156/98   04/29/21 (!) 139/99     Plan to add hydrochlorothiazide.   Labs with GI tomorrow and repeat BP/lytes in 1 week.  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 Crohn's - being worked up by GI and has f/up appointment tomorrow.    (M79.89) Soft tissue mass  Comment: Decreased in size on last CT but still present.   Plan: Working to schedule IR biopsy.     (J90) Pleural effusion  Comment: Resolved. Cultures and path negative     (E66.01) BMI > 40.0 (H)  Comment: Does not feel healthy. See PI - getting pressure from family. Has tried topamax in the past but didn't have great response. ?phentermine but would need to watch anxiety.     (Z30.41) Encounter for surveillance of contraceptive pills  Plan: norgestimate-ethinyl estradiol (SPRINTEC 28)         0.25-35 MG-MCG tablet    Wellness in Sept:  Pap  tdap  microalbumin     --------------------------  PATIENT INSTRUCTIONS    Patient Instructions   Great to see you.     I appreciate your worry and focus on healthy weight. However right now we need your focus on be on figuring out what is going on with the liver and GI system. Once we get a better idea of this I'll be thrilled to partner with you on healthy weight.     The new job brings an opportunity to set up a new schedule - this is a place you can work on the healthy eating and healthy movement part.     I would not recommend changing your anxiety medication right now - I think this is helping you manage and navigate the health uncertainty.     Blood pressure  -  I think we can do a bit better here.   - Changing to a combination medication  - Labs tomorrow with Dr. Javier (BMP - if she's doing a CMP do not need to do mine)  - Repeat labs (electrolytes, kidney function) and BP check on the new medication in 1-2 weeks        -------------------------    Return in about 3 months (around 9/9/2021) for Wellness Visit.    Jeff Contreras MD  [facility] John is a 41 year old who presents for the following health issues     History of Present Illness       Hypertension: She presents for follow up of hypertension.  She does not check blood pressure  regularly outside of the clinic. Outside blood pressures have been over 140/90. She does not follow a low salt diet.     She eats 2-3 servings of fruits and vegetables daily.She consumes 2 sweetened beverage(s) daily.   She is taking medications regularly.     # RUQ discomfort  - back this week  - 1.  Interval decrease in perihepatic soft tissue thickening with  calcifications, likely representing inflammatory mass. No drainable  fluid collection.  - planning for IR biopsy at [facility]     # HTN  BP Readings from Last 6 Encounters:   06/09/21 (!) 142/90   05/26/21 (!) 161/111   05/20/21 (!) 149/96   05/06/21 132/84   05/01/21 (!) 156/98   04/29/21 (!) 139/99     # ? Crohn's  - FH of autoimmune disease  - Seeing GI (Dr. Gilliam) tomorrow    # Healthy weight  Wt Readings from Last 4 Encounters:   06/09/21 121 kg (266 lb 12.8 oz)   05/26/21 120.4 kg (265 lb 6.4 oz)   05/06/21 121 kg (266 lb 11.2 oz)   04/29/21 119.9 kg (264 lb 5.3 oz)     - went to family counseling bc her mom passed - needs Dd to stop talking as her weight  - was on topamax before - didn't take it at night.    # Social  - just got a new job - this is good, positive, job change      Review of Systems   Constitutional, HEENT, cardiovascular, pulmonary, gi and gu systems are negative, except as otherwise noted.      Objective    BP  (!) 142/90 (BP Location: Right arm, Patient Position: Sitting, Cuff Size: Adult Large)   Pulse 84   Temp 98.4  F (36.9  C) (Tympanic)   Resp 18   Wt 121 kg (266 lb 12.8 oz)   SpO2 97%   BMI 48.80 kg/m    Body mass index is 48.8 kg/m .  Physical Exam   GENERAL: healthy, alert and no distress  RESP: lungs clear to auscultation - no rales, rhonchi or wheezes  CV: regular rate and rhythm, normal S1 S2, no S3 or S4, no murmur, click or rub, no peripheral edema and peripheral pulses strong  PSYCH: mentation appears normal, affect normal/bright

## 2021-06-09 NOTE — PATIENT INSTRUCTIONS
Great to see you.     I appreciate your worry and focus on healthy weight. However right now we need your focus on be on figuring out what is going on with the liver and GI system. Once we get a better idea of this I'll be thrilled to partner with you on healthy weight.     The new job brings an opportunity to set up a new schedule - this is a place you can work on the healthy eating and healthy movement part.     I would not recommend changing your anxiety medication right now - I think this is helping you manage and navigate the health uncertainty.     Blood pressure  - I think we can do a bit better here.   - Changing to a combination medication  - Labs tomorrow with Dr. Javier ([facility] - if she's doing a CMP do not need to do mine)  - Repeat labs (electrolytes, kidney function) and BP check on the new medication in 1-2 weeks

## 2021-06-10 ENCOUNTER — TRANSFERRED RECORDS (OUTPATIENT)
Dept: HEALTH INFORMATION MANAGEMENT | Facility: CLINIC | Age: 41
End: 2021-06-10

## 2021-06-10 DIAGNOSIS — Z11.59 ENCOUNTER FOR SCREENING FOR OTHER VIRAL DISEASES: ICD-10-CM

## 2021-06-10 LAB
ALT SERPL-CCNC: 45 U/L (ref 0–78)
AST SERPL-CCNC: 30 U/L (ref 0–37)
CREAT SERPL-MCNC: 0.64 MG/DL (ref 0.6–1.02)
GFR SERPL CREATININE-BSD FRML MDRD: >60 ML/MIN/1.73M2
GLUCOSE SERPL-MCNC: 118 MG/DL (ref 74–100)
HEP C HIM: NORMAL
LABORATORY COMMENT REPORT: NORMAL
POTASSIUM SERPL-SCNC: 4.1 MMOL/L (ref 3.5–5)
SARS-COV-2 RNA RESP QL NAA+PROBE: NEGATIVE
SARS-COV-2 RNA RESP QL NAA+PROBE: NORMAL
SPECIMEN SOURCE: NORMAL
SPECIMEN SOURCE: NORMAL

## 2021-06-10 PROCEDURE — U0003 INFECTIOUS AGENT DETECTION BY NUCLEIC ACID (DNA OR RNA); SEVERE ACUTE RESPIRATORY SYNDROME CORONAVIRUS 2 (SARS-COV-2) (CORONAVIRUS DISEASE [COVID-19]), AMPLIFIED PROBE TECHNIQUE, MAKING USE OF HIGH THROUGHPUT TECHNOLOGIES AS DESCRIBED BY CMS-2020-01-R: HCPCS | Performed by: INTERNAL MEDICINE

## 2021-06-10 PROCEDURE — U0005 INFEC AGEN DETEC AMPLI PROBE: HCPCS | Performed by: INTERNAL MEDICINE

## 2021-06-11 ENCOUNTER — HOSPITAL ENCOUNTER (OUTPATIENT)
Dept: ULTRASOUND IMAGING | Facility: CLINIC | Age: 41
Discharge: HOME | End: 2021-06-11
Attending: INTERNAL MEDICINE | Admitting: INTERNAL MEDICINE
Payer: COMMERCIAL

## 2021-06-11 DIAGNOSIS — R93.5 ABNORMAL CT OF THE ABDOMEN: ICD-10-CM

## 2021-06-11 LAB
INR PPP: 0.9 (ref 0.86–1.14)
PLATELET # BLD AUTO: 532 10E9/L (ref 150–450)

## 2021-06-11 PROCEDURE — 85049 AUTOMATED PLATELET COUNT: CPT | Performed by: RADIOLOGY

## 2021-06-11 PROCEDURE — 85610 PROTHROMBIN TIME: CPT | Performed by: RADIOLOGY

## 2021-06-11 RX ORDER — FENTANYL CITRATE 50 UG/ML
INJECTION, SOLUTION INTRAMUSCULAR; INTRAVENOUS
Status: DISCONTINUED
Start: 2021-06-11 | End: 2021-06-11 | Stop reason: WASHOUT

## 2021-06-11 RX ORDER — LIDOCAINE 40 MG/G
CREAM TOPICAL
Status: DISCONTINUED | OUTPATIENT
Start: 2021-06-11 | End: 2021-06-12 | Stop reason: HOSPADM

## 2021-06-14 ENCOUNTER — HOSPITAL ENCOUNTER (OUTPATIENT)
Dept: CT IMAGING | Facility: CLINIC | Age: 41
Discharge: HOME | End: 2021-06-14
Attending: INTERNAL MEDICINE | Admitting: INTERNAL MEDICINE
Payer: COMMERCIAL

## 2021-06-14 VITALS
SYSTOLIC BLOOD PRESSURE: 132 MMHG | RESPIRATION RATE: 16 BRPM | HEART RATE: 91 BPM | OXYGEN SATURATION: 98 % | DIASTOLIC BLOOD PRESSURE: 83 MMHG

## 2021-06-14 DIAGNOSIS — R93.5 ABNORMAL CT OF THE ABDOMEN: ICD-10-CM

## 2021-06-14 PROCEDURE — 77012 CT SCAN FOR NEEDLE BIOPSY: CPT

## 2021-06-14 PROCEDURE — 88305 TISSUE EXAM BY PATHOLOGIST: CPT | Mod: 26 | Performed by: PATHOLOGY

## 2021-06-14 PROCEDURE — 250N000009 HC RX 250: Performed by: RADIOLOGY

## 2021-06-14 PROCEDURE — 88305 TISSUE EXAM BY PATHOLOGIST: CPT | Mod: TC | Performed by: RADIOLOGY

## 2021-06-14 PROCEDURE — 250N000011 HC RX IP 250 OP 636: Performed by: RADIOLOGY

## 2021-06-14 PROCEDURE — 250N000011 HC RX IP 250 OP 636

## 2021-06-14 PROCEDURE — 272N000431 CT ABDOMEN RETROPERITONEAL BIOPSY

## 2021-06-14 RX ORDER — NALOXONE HYDROCHLORIDE 0.4 MG/ML
0.4 INJECTION, SOLUTION INTRAMUSCULAR; INTRAVENOUS; SUBCUTANEOUS
Status: DISCONTINUED | OUTPATIENT
Start: 2021-06-14 | End: 2021-06-15 | Stop reason: HOSPADM

## 2021-06-14 RX ORDER — NALOXONE HYDROCHLORIDE 0.4 MG/ML
0.2 INJECTION, SOLUTION INTRAMUSCULAR; INTRAVENOUS; SUBCUTANEOUS
Status: DISCONTINUED | OUTPATIENT
Start: 2021-06-14 | End: 2021-06-15 | Stop reason: HOSPADM

## 2021-06-14 RX ORDER — FLUMAZENIL 0.1 MG/ML
0.2 INJECTION, SOLUTION INTRAVENOUS
Status: DISCONTINUED | OUTPATIENT
Start: 2021-06-14 | End: 2021-06-15 | Stop reason: HOSPADM

## 2021-06-14 RX ORDER — FENTANYL CITRATE 50 UG/ML
INJECTION, SOLUTION INTRAMUSCULAR; INTRAVENOUS
Status: COMPLETED
Start: 2021-06-14 | End: 2021-06-14

## 2021-06-14 RX ORDER — FENTANYL CITRATE 50 UG/ML
25-50 INJECTION, SOLUTION INTRAMUSCULAR; INTRAVENOUS EVERY 5 MIN PRN
Status: DISCONTINUED | OUTPATIENT
Start: 2021-06-14 | End: 2021-06-15 | Stop reason: HOSPADM

## 2021-06-14 RX ORDER — FENTANYL CITRATE 50 UG/ML
100 INJECTION, SOLUTION INTRAMUSCULAR; INTRAVENOUS
Status: DISCONTINUED | OUTPATIENT
Start: 2021-06-14 | End: 2021-06-15 | Stop reason: HOSPADM

## 2021-06-14 RX ADMIN — LIDOCAINE HYDROCHLORIDE 15 ML: 10 INJECTION, SOLUTION EPIDURAL; INFILTRATION; INTRACAUDAL; PERINEURAL at 09:56

## 2021-06-14 RX ADMIN — MIDAZOLAM 2 MG: 1 INJECTION INTRAMUSCULAR; INTRAVENOUS at 09:53

## 2021-06-14 RX ADMIN — FENTANYL CITRATE 50 MCG: 50 INJECTION, SOLUTION INTRAMUSCULAR; INTRAVENOUS at 09:54

## 2021-06-14 RX ADMIN — FENTANYL CITRATE 50 MCG: 50 INJECTION, SOLUTION INTRAMUSCULAR; INTRAVENOUS at 09:58

## 2021-06-14 NOTE — PROCEDURES
Interventional Radiology Post-Procedure Note   [facility]  ?   Brief Procedure Note:   Patient name: Dora Barrera  Pt MRN:[id number]   Date of procedure: 6/14/2021     Procedure(s): CT Guided right retroperitoneal mass biopsy  Sedation method: Moderate sedation was employed. The patient was monitored by an interventional radiology nurse at all times throughout the procedure under my direct guidance.  Pre Procedure Diagnosis: Right perihepatic mass  Post Procedure Diagnosis: Same  Indications: Need for pathologic evaluation of right perihepatic retroperitoneal mass   ?   Attending: Donte Chanel M.D.  Specimen(s) removed: Six 18g core needle specimens  Additional studies ordered: None  Drains: None   Estimated Blood Loss: Minimal  Complications: None  Vascular closure method: N/A    Findings/Notes/Comments: Successful CT Guided core needle biopsy of right retroperitoneal perihepatic mass.   ?   Please see dictation in PACS or under the Imaging tab in [facility] for detailed procedure note.     Donte Chanel M.D.   Vascular and Interventional Radiology   Pager: [phone]   After Hours / Scheduling: [phone]     6/14/2021  10:43 AM

## 2021-06-14 NOTE — PROGRESS NOTES
Patient tolerated retroperitoneal mass biopsy well by Dr Chanel.  2 mg of versed and 100 mcg of fentanyl given.  Bandage remained clean dry and intact at time of discharge to home.  Patient states understanding of discharge instructions and home per family.    Luci Gonzalez, RN, BSN

## 2021-06-17 LAB — COPATH REPORT: NORMAL

## 2021-06-21 NOTE — ANESTHESIA PROCEDURE NOTES
Airway       Patient location during procedure: OR  Staff -        CRNA: Shadia Sun APRN CRNA       Performed By: CRNAIndications and Patient Condition       Indications for airway management: mere-procedural       Induction type:intravenous       Mask difficulty assessment: 2 - vent by mask + OA or adjuvant +/- NMBA    Final Airway Details       Final airway type: endotracheal airway       Successful airway: ETT - double lumen left  Endotracheal Airway Details        Cuffed: yes       Successful intubation technique: video laryngoscopy       VL Blade Size: MAC 3       Grade View of Cords: 1       Adjucts: stylet       Position: Right       Bite block used: None       ETT Double lumen (fr): 37    Post intubation assessment        Placement verified by: capnometry, equal breath sounds and chest rise        Number of attempts at approach: 1       Secured with: silk tape       Ease of procedure: easy       Dentition: Intact and Unchanged    Medication(s) Administered   Medication Administration Time: 4/29/2021 3:40 PM            
Detail Level: Simple
Detail Level: Generalized

## 2021-06-26 LAB
MYCOBACTERIUM SPEC CULT: NORMAL
MYCOBACTERIUM SPEC CULT: NORMAL
SPECIMEN SOURCE: NORMAL

## 2021-06-29 ENCOUNTER — HOSPITAL ENCOUNTER (OUTPATIENT)
Dept: LAB | Facility: CLINIC | Age: 41
Discharge: HOME | End: 2021-06-29
Attending: INTERNAL MEDICINE | Admitting: INTERNAL MEDICINE
Payer: COMMERCIAL

## 2021-06-29 DIAGNOSIS — I10 BENIGN ESSENTIAL HYPERTENSION: ICD-10-CM

## 2021-06-29 LAB
ANION GAP SERPL CALCULATED.3IONS-SCNC: 6 MMOL/L (ref 3–14)
BUN SERPL-MCNC: 13 MG/DL (ref 7–30)
CALCIUM SERPL-MCNC: 8.7 MG/DL (ref 8.5–10.1)
CHLORIDE SERPL-SCNC: 105 MMOL/L (ref 94–109)
CO2 SERPL-SCNC: 27 MMOL/L (ref 20–32)
CREAT SERPL-MCNC: 0.72 MG/DL (ref 0.52–1.04)
GFR SERPL CREATININE-BSD FRML MDRD: >90 ML/MIN/{1.73_M2}
GLUCOSE SERPL-MCNC: 89 MG/DL (ref 70–99)
POTASSIUM SERPL-SCNC: 3.2 MMOL/L (ref 3.4–5.3)
SODIUM SERPL-SCNC: 138 MMOL/L (ref 133–144)

## 2021-06-29 PROCEDURE — 80048 BASIC METABOLIC PNL TOTAL CA: CPT | Performed by: INTERNAL MEDICINE

## 2021-06-30 RX ORDER — POTASSIUM CHLORIDE 1500 MG/1
20 TABLET, EXTENDED RELEASE ORAL DAILY
Qty: 90 TABLET | Refills: 3 | Status: SHIPPED | OUTPATIENT
Start: 2021-06-30 | End: 2021-09-29

## 2021-07-05 ENCOUNTER — ALLIED HEALTH/NURSE VISIT (OUTPATIENT)
Dept: PEDIATRICS | Facility: CLINIC | Age: 41
End: 2021-07-05
Payer: COMMERCIAL

## 2021-07-05 VITALS — DIASTOLIC BLOOD PRESSURE: 92 MMHG | SYSTOLIC BLOOD PRESSURE: 130 MMHG

## 2021-07-05 DIAGNOSIS — Z01.30 BP CHECK: Primary | ICD-10-CM

## 2021-07-05 PROCEDURE — 99207 PR NO CHARGE NURSE ONLY: CPT | Performed by: INTERNAL MEDICINE

## 2021-07-05 NOTE — PROGRESS NOTES
Continue BP med (lisinopril 20/hctz 12.5) - can we have her come in for MA BP check in a week or so? BP looked okay at her IR biopsy (and would expect her to be nervous for that).    RACHEL Contreras MD  Internal Medicine-Pediatrics

## 2021-07-05 NOTE — PROGRESS NOTES
Dora Barrera was evaluated at [facility] on July 5, 2021 at which time her blood pressure was:    BP Readings from Last 3 Encounters:   07/03/21 (!) 130/92   06/14/21 132/83   06/09/21 (!) 142/90     Pulse Readings from Last 3 Encounters:   06/14/21 91   06/09/21 84   05/26/21 103       Reviewed lifestyle modifications for blood pressure control and reduction: including making healthy food choices, managing weight, getting regular exercise, smoking cessation, reducing alcohol consumption, monitoring blood pressure regularly.     Symptoms: None    BP Goal:< 140/90 mmHg    BP Assessment:  BP too high    Potential Reasons for BP too high: NA - Not applicable    BP Follow-Up Plan: Recheck BP in 30 days at pharmacy    Recommendation to Provider: Pt recently started hydrochlorothiazide.  Rechecking bp in 1 week at pharmacy.    Note completed by:   Zeinab Blanco, PharmD  [facility] [place]  [phone]

## 2021-07-05 NOTE — PROGRESS NOTES
Called and LVM for the patient to call the clinic back.      Fay Adam    July 5, 2021 at 1:31 PM

## 2021-07-17 NOTE — ADDENDUM NOTE
Addendum  created 07/16/21 2047 by Mumtaz Pat MD    Attestation recorded in Intraprocedure, Intraprocedure Attestations filed

## 2021-07-31 ENCOUNTER — HEALTH MAINTENANCE LETTER (OUTPATIENT)
Age: 41
End: 2021-07-31

## 2021-08-11 ENCOUNTER — HOSPITAL ENCOUNTER (OUTPATIENT)
Dept: MRI IMAGING | Facility: CLINIC | Age: 41
End: 2021-08-11
Attending: INTERNAL MEDICINE
Payer: COMMERCIAL

## 2021-08-11 ENCOUNTER — HOSPITAL ENCOUNTER (OUTPATIENT)
Dept: MAMMOGRAPHY | Facility: CLINIC | Age: 41
End: 2021-08-11
Attending: INTERNAL MEDICINE
Payer: COMMERCIAL

## 2021-08-11 DIAGNOSIS — R93.5 ABNORMAL CT OF THE ABDOMEN: ICD-10-CM

## 2021-08-11 DIAGNOSIS — Z12.31 VISIT FOR SCREENING MAMMOGRAM: ICD-10-CM

## 2021-08-11 PROCEDURE — 77067 SCR MAMMO BI INCL CAD: CPT

## 2021-08-11 PROCEDURE — 255N000002 HC RX 255 OP 636: Performed by: INTERNAL MEDICINE

## 2021-08-11 PROCEDURE — A9585 GADOBUTROL INJECTION: HCPCS | Performed by: INTERNAL MEDICINE

## 2021-08-11 PROCEDURE — 74183 MRI ABD W/O CNTR FLWD CNTR: CPT

## 2021-08-11 RX ORDER — GADOBUTROL 604.72 MG/ML
15 INJECTION INTRAVENOUS ONCE
Status: COMPLETED | OUTPATIENT
Start: 2021-08-11 | End: 2021-08-11

## 2021-08-11 RX ADMIN — GADOBUTROL 13 ML: 604.72 INJECTION INTRAVENOUS at 09:57

## 2021-08-18 ENCOUNTER — TRANSFERRED RECORDS (OUTPATIENT)
Dept: HEALTH INFORMATION MANAGEMENT | Facility: CLINIC | Age: 41
End: 2021-08-18

## 2021-08-19 ENCOUNTER — MYC MEDICAL ADVICE (OUTPATIENT)
Dept: PEDIATRICS | Facility: CLINIC | Age: 41
End: 2021-08-19

## 2021-08-19 DIAGNOSIS — I10 BENIGN ESSENTIAL HYPERTENSION: ICD-10-CM

## 2021-08-20 RX ORDER — LISINOPRIL AND HYDROCHLOROTHIAZIDE 12.5; 2 MG/1; MG/1
1 TABLET ORAL DAILY
Qty: 90 TABLET | Refills: 0 | Status: SHIPPED | OUTPATIENT
Start: 2021-08-20 | End: 2021-09-29

## 2021-08-20 NOTE — TELEPHONE ENCOUNTER
BP at outpatient clinic looks great - can we add this to pt reported vitals?     If not okay to for her to f/up with me in Sept as planned.     RACHEL Contreras MD  Internal Medicine-Pediatrics

## 2021-08-23 NOTE — TELEPHONE ENCOUNTER
Vitals - Patient Reported 8/9/2021 8/18/2021   Systolic (Patient Reported) 111 126   Diastolic (Patient Reported) 64 79   Pulse (Patient Reported) 89 72

## 2021-09-25 ENCOUNTER — HEALTH MAINTENANCE LETTER (OUTPATIENT)
Age: 41
End: 2021-09-25

## 2021-09-28 ASSESSMENT — ANXIETY QUESTIONNAIRES
5. BEING SO RESTLESS THAT IT IS HARD TO SIT STILL: NOT AT ALL
3. WORRYING TOO MUCH ABOUT DIFFERENT THINGS: NOT AT ALL
GAD7 TOTAL SCORE: 2
GAD7 TOTAL SCORE: 2
7. FEELING AFRAID AS IF SOMETHING AWFUL MIGHT HAPPEN: NOT AT ALL
GAD7 TOTAL SCORE: 2
7. FEELING AFRAID AS IF SOMETHING AWFUL MIGHT HAPPEN: NOT AT ALL
8. IF YOU CHECKED OFF ANY PROBLEMS, HOW DIFFICULT HAVE THESE MADE IT FOR YOU TO DO YOUR WORK, TAKE CARE OF THINGS AT HOME, OR GET ALONG WITH OTHER PEOPLE?: SOMEWHAT DIFFICULT
6. BECOMING EASILY ANNOYED OR IRRITABLE: SEVERAL DAYS
4. TROUBLE RELAXING: NOT AT ALL
1. FEELING NERVOUS, ANXIOUS, OR ON EDGE: SEVERAL DAYS
2. NOT BEING ABLE TO STOP OR CONTROL WORRYING: NOT AT ALL

## 2021-09-28 ASSESSMENT — ENCOUNTER SYMPTOMS
FREQUENCY: 1
COUGH: 0
PALPITATIONS: 0
WEAKNESS: 0
FEVER: 0
HEARTBURN: 0
DIARRHEA: 1
CONSTIPATION: 0
ABDOMINAL PAIN: 0
HEMATURIA: 0
JOINT SWELLING: 0
BREAST MASS: 0
PARESTHESIAS: 0
HEADACHES: 0
MYALGIAS: 1
NAUSEA: 0
CHILLS: 0
NERVOUS/ANXIOUS: 1
ARTHRALGIAS: 0
EYE PAIN: 0
HEMATOCHEZIA: 0
DYSURIA: 0
SORE THROAT: 0
DIZZINESS: 0
SHORTNESS OF BREATH: 0

## 2021-09-28 ASSESSMENT — PATIENT HEALTH QUESTIONNAIRE - PHQ9
SUM OF ALL RESPONSES TO PHQ QUESTIONS 1-9: 4
SUM OF ALL RESPONSES TO PHQ QUESTIONS 1-9: 4
10. IF YOU CHECKED OFF ANY PROBLEMS, HOW DIFFICULT HAVE THESE PROBLEMS MADE IT FOR YOU TO DO YOUR WORK, TAKE CARE OF THINGS AT HOME, OR GET ALONG WITH OTHER PEOPLE: SOMEWHAT DIFFICULT

## 2021-09-28 NOTE — PROGRESS NOTES
Pre-Visit Planning   Next 5 appointments (look out 90 days)    Sep 29, 2021  7:15 AM  (Arrive by 7:00 AM)  Adult Preventative Visit with Jeff Contreras MD  [facility] ([facility] ) [address]  [address]  [place] [address] [phone]        Appointment Notes for this encounter:   Annual Physical     Questionnaires Reviewed/Assigned  Additional questionnaires assigned    Patient preferred phone number: [phone]    Sent patient PVP MyChart Message     Per patient reply would like to discuss:  - discussing need for potassium supplement or change in Rx, struggling to take due to pill size  - has cut Lexapro dose in half 1mo ago (down to 10mg daily)    #HTN  - lisinopril-hydrochlorothiazide 20-12.5 daily  - due for labs    BP Readings from Last 6 Encounters:   07/03/21 (!) 130/92   06/14/21 132/83   06/09/21 (!) 142/90   05/26/21 (!) 161/111   05/20/21 (!) 149/96   05/06/21 132/84     Vitals - Patient Reported 8/9/2021 8/18/2021   Weight (Patient Reported) 268 lb 11.9 oz -   Systolic (Patient Reported) 111 126   Diastolic (Patient Reported) 64 79   Pulse (Patient Reported) 89 72       #RUQ phlegmon  - [facility] - getting worked up for Chron's     - 8/9 onc visit with Dr. Ibarra   - Plan: liver biopsy and CT of chest/abd/pelvis  - 8/11 MR Enterography ([facility] work up)  IMPRESSION:  1.  No evidence for active inflammatory bowel disease or it  complications. No bowel obstruction, or visible fistula.  2.  No change in size of a complex process along the posterior right  upper abdomen abutting the right liver. This shows a degree of  peripheral and mild internal enhancement. This could relate to an  infectious or inflammatory process such as phlegmon. A neoplastic  etiology is not excluded based on imaging alone and correlation with  tissue sampling should be made.    - 8/18 CT + Liver Biopsy(Dr. Ibarra order)   A) LIVER, POSTERIOR RIGHT LESION, CORE  BIOPSY WITH TOUCH IMPRINTS:  1. Abscess with acute inflammation, histiocytes and plasma cells  2. Negative for malignancy  3. Negative for background liver in the sample  4. See comment     Unclear what follow up was after this procedure?    9/9 consult between Dr. Ibarra + Dr. Gilliam ([facility])   - see care everywhere documentation    #HM Due  - PAP   - labs  - flu, tdap  - Covid Booster (likely in October)

## 2021-09-29 ENCOUNTER — OFFICE VISIT (OUTPATIENT)
Dept: PEDIATRICS | Facility: CLINIC | Age: 41
End: 2021-09-29
Payer: COMMERCIAL

## 2021-09-29 VITALS
RESPIRATION RATE: 18 BRPM | TEMPERATURE: 97.8 F | HEIGHT: 63 IN | SYSTOLIC BLOOD PRESSURE: 112 MMHG | DIASTOLIC BLOOD PRESSURE: 72 MMHG | OXYGEN SATURATION: 94 % | HEART RATE: 96 BPM | WEIGHT: 271.2 LBS | BODY MASS INDEX: 48.05 KG/M2

## 2021-09-29 DIAGNOSIS — K21.00 GASTROESOPHAGEAL REFLUX DISEASE WITH ESOPHAGITIS, UNSPECIFIED WHETHER HEMORRHAGE: ICD-10-CM

## 2021-09-29 DIAGNOSIS — Z23 NEED FOR VACCINATION: ICD-10-CM

## 2021-09-29 DIAGNOSIS — Z12.4 SCREENING FOR CERVICAL CANCER: ICD-10-CM

## 2021-09-29 DIAGNOSIS — R73.03 PREDIABETES: ICD-10-CM

## 2021-09-29 DIAGNOSIS — Z13.220 SCREENING CHOLESTEROL LEVEL: ICD-10-CM

## 2021-09-29 DIAGNOSIS — I10 BENIGN ESSENTIAL HYPERTENSION: ICD-10-CM

## 2021-09-29 DIAGNOSIS — K44.9 HIATAL HERNIA: ICD-10-CM

## 2021-09-29 DIAGNOSIS — Z23 NEED FOR PROPHYLACTIC VACCINATION AND INOCULATION AGAINST INFLUENZA: ICD-10-CM

## 2021-09-29 DIAGNOSIS — E66.01 MORBID OBESITY (H): ICD-10-CM

## 2021-09-29 DIAGNOSIS — K90.49 PROTEIN LOSING ENTEROPATHY: ICD-10-CM

## 2021-09-29 DIAGNOSIS — F41.9 ANXIETY: ICD-10-CM

## 2021-09-29 DIAGNOSIS — B00.9 HSV-2 (HERPES SIMPLEX VIRUS 2) INFECTION: ICD-10-CM

## 2021-09-29 DIAGNOSIS — Z00.00 ROUTINE GENERAL MEDICAL EXAMINATION AT A HEALTH CARE FACILITY: Primary | ICD-10-CM

## 2021-09-29 DIAGNOSIS — Z80.0 FAMILY HISTORY OF COLON CANCER: ICD-10-CM

## 2021-09-29 LAB
CREAT UR-MCNC: 132 MG/DL
ERYTHROCYTE [DISTWIDTH] IN BLOOD BY AUTOMATED COUNT: 13.4 % (ref 10–15)
HBA1C MFR BLD: 5.9 % (ref 0–5.6)
HCT VFR BLD AUTO: 38.7 % (ref 35–47)
HGB BLD-MCNC: 12.4 G/DL (ref 11.7–15.7)
MCH RBC QN AUTO: 28.6 PG (ref 26.5–33)
MCHC RBC AUTO-ENTMCNC: 32 G/DL (ref 31.5–36.5)
MCV RBC AUTO: 89 FL (ref 78–100)
MICROALBUMIN UR-MCNC: 8 MG/L
MICROALBUMIN/CREAT UR: 6.06 MG/G CR (ref 0–25)
PLATELET # BLD AUTO: 519 10E3/UL (ref 150–450)
RBC # BLD AUTO: 4.33 10E6/UL (ref 3.8–5.2)
WBC # BLD AUTO: 11.5 10E3/UL (ref 4–11)

## 2021-09-29 PROCEDURE — 90686 IIV4 VACC NO PRSV 0.5 ML IM: CPT | Performed by: INTERNAL MEDICINE

## 2021-09-29 PROCEDURE — 90471 IMMUNIZATION ADMIN: CPT | Performed by: INTERNAL MEDICINE

## 2021-09-29 PROCEDURE — 90715 TDAP VACCINE 7 YRS/> IM: CPT | Performed by: INTERNAL MEDICINE

## 2021-09-29 PROCEDURE — 83036 HEMOGLOBIN GLYCOSYLATED A1C: CPT | Performed by: INTERNAL MEDICINE

## 2021-09-29 PROCEDURE — 85027 COMPLETE CBC AUTOMATED: CPT | Performed by: INTERNAL MEDICINE

## 2021-09-29 PROCEDURE — 99396 PREV VISIT EST AGE 40-64: CPT | Mod: 25 | Performed by: INTERNAL MEDICINE

## 2021-09-29 PROCEDURE — G0145 SCR C/V CYTO,THINLAYER,RESCR: HCPCS | Performed by: INTERNAL MEDICINE

## 2021-09-29 PROCEDURE — 99214 OFFICE O/P EST MOD 30 MIN: CPT | Mod: 25 | Performed by: INTERNAL MEDICINE

## 2021-09-29 PROCEDURE — 80061 LIPID PANEL: CPT | Performed by: INTERNAL MEDICINE

## 2021-09-29 PROCEDURE — 87624 HPV HI-RISK TYP POOLED RSLT: CPT | Performed by: INTERNAL MEDICINE

## 2021-09-29 PROCEDURE — 80053 COMPREHEN METABOLIC PANEL: CPT | Performed by: INTERNAL MEDICINE

## 2021-09-29 PROCEDURE — 90472 IMMUNIZATION ADMIN EACH ADD: CPT | Performed by: INTERNAL MEDICINE

## 2021-09-29 PROCEDURE — 36415 COLL VENOUS BLD VENIPUNCTURE: CPT | Performed by: INTERNAL MEDICINE

## 2021-09-29 PROCEDURE — 82043 UR ALBUMIN QUANTITATIVE: CPT | Performed by: INTERNAL MEDICINE

## 2021-09-29 RX ORDER — LISINOPRIL AND HYDROCHLOROTHIAZIDE 12.5; 2 MG/1; MG/1
1 TABLET ORAL DAILY
Qty: 90 TABLET | Refills: 3 | Status: SHIPPED | OUTPATIENT
Start: 2021-09-29 | End: 2022-09-09

## 2021-09-29 RX ORDER — ESCITALOPRAM OXALATE 10 MG/1
10 TABLET ORAL DAILY
Qty: 90 TABLET | Refills: 3 | Status: SHIPPED | OUTPATIENT
Start: 2021-09-29 | End: 2022-05-04

## 2021-09-29 RX ORDER — POTASSIUM CHLORIDE 1.5 G/1.58G
20 POWDER, FOR SOLUTION ORAL DAILY
Qty: 100 EACH | Refills: 3 | Status: SHIPPED | OUTPATIENT
Start: 2021-09-29 | End: 2022-09-09

## 2021-09-29 RX ORDER — VALACYCLOVIR HYDROCHLORIDE 500 MG/1
500 TABLET, FILM COATED ORAL 2 TIMES DAILY
Qty: 36 TABLET | Refills: 0 | Status: SHIPPED | OUTPATIENT
Start: 2021-09-29 | End: 2022-06-17

## 2021-09-29 ASSESSMENT — ENCOUNTER SYMPTOMS
HEMATOCHEZIA: 0
BREAST MASS: 0
WEAKNESS: 0
NERVOUS/ANXIOUS: 1
DIARRHEA: 1
HEMATURIA: 0
MYALGIAS: 1
PARESTHESIAS: 0
FREQUENCY: 1
EYE PAIN: 0
COUGH: 0
CONSTIPATION: 0
DYSURIA: 0
ABDOMINAL PAIN: 0
SHORTNESS OF BREATH: 0
NAUSEA: 0
CHILLS: 0
HEADACHES: 0
DIZZINESS: 0
HEARTBURN: 0
SORE THROAT: 0
JOINT SWELLING: 0
FEVER: 0
PALPITATIONS: 0
ARTHRALGIAS: 0

## 2021-09-29 ASSESSMENT — ANXIETY QUESTIONNAIRES: GAD7 TOTAL SCORE: 2

## 2021-09-29 ASSESSMENT — MIFFLIN-ST. JEOR: SCORE: 1861.35

## 2021-09-29 ASSESSMENT — PATIENT HEALTH QUESTIONNAIRE - PHQ9: SUM OF ALL RESPONSES TO PHQ QUESTIONS 1-9: 4

## 2021-09-29 NOTE — PROGRESS NOTES
SUBJECTIVE:   CC: Dora Barrera is an 41 year old woman who presents for preventive health visit.   Patient has been advised of split billing requirements and indicates understanding: Yes     Healthy Habits:     Getting at least 3 servings of Calcium per day:  NO    Bi-annual eye exam:  Yes    Dental care twice a year:  Yes    Sleep apnea or symptoms of sleep apnea:  None    Diet:  Regular (no restrictions)    Frequency of exercise:  None    Taking medications regularly:  No    Medication side effects:  None    PHQ-2 Total Score: 0    Additional concerns today:  No    Today's PHQ-2 Score:   PHQ-2 ( 1999 Pfizer) 9/28/2021   Q1: Little interest or pleasure in doing things 0   Q2: Feeling down, depressed or hopeless 0   PHQ-2 Score 0   Q1: Little interest or pleasure in doing things Not at all   Q2: Feeling down, depressed or hopeless Not at all   PHQ-2 Score 0     Abuse: Current or Past (Physical, Sexual or Emotional) - No  Do you feel safe in your environment? Yes    Have you ever done Advance Care Planning? (For example, a Health Directive, POLST, or a discussion with a medical provider or your loved ones about your wishes): Yes, patient states has an Advance Care Planning document and will bring a copy to the clinic.    Social History     Tobacco Use     Smoking status: Never Smoker     Smokeless tobacco: Never Used   Substance Use Topics     Alcohol use: Yes     Comment: occ         Alcohol Use 9/28/2021   Prescreen: >3 drinks/day or >7 drinks/week? No   Prescreen: >3 drinks/day or >7 drinks/week? -       Reviewed orders with patient.  Reviewed health maintenance and updated orders accordingly - Yes    # Healthy weight  - hiding food (from dad, won't bring drive through coffee into work)  - some binging    Stopped sprintec - better off    # HTN  - lisinopril 20/hctz 12.5    # GERD  - omeprazole 20 bid    # Protein losing enteropathy  # ? Crohn's   - 8/2021 MR enterography no evidence of inflammation  -  Meeting with [facility] next month for a follow up     # Anxiety  - lexapro 10 mg   - goal edmar to wean down  PHQ 5/28/2020 9/25/2020 9/28/2021   PHQ-9 Total Score 2 5 4   Q9: Thoughts of better off dead/self-harm past 2 weeks Not at all Not at all Not at all     JAQUELINE-7 SCORE 5/28/2020 9/25/2020 9/28/2021   Total Score - - 2 (minimal anxiety)   Total Score 3 4 2     # Pleural effusion  # Liver lesion  - has had 2 liver biopsies   - most recent 8/18/21  A) LIVER, POSTERIOR RIGHT LESION, CORE BIOPSY WITH TOUCH IMPRINTS:   1. Abscess with acute inflammation, histiocytes and plasma cells   2. Negative for malignancy   3. Negative for background liver in the sample   4. See comment     Comment  This sample consists of mixed mixed inflammation, including prominent acute inflammation, compatible with abscess.  A cytokeratin stain is negative for carcinoma.  A Melan-A/A103 stain is negative for melanoma.  A CD68 stain highlights numerous histiocytes.  An IgG for immunostain highlights scattered plasma cells, but is unlikely significant for IgG4 disease.  A Silver (GMS) stain is negative for fungal organisms.         # HCM  - pap/hpv  - ? mammo  - colon utd   - tdap due    Breast Cancer Screening:  Any new diagnosis of family breast, ovarian, or bowel cancer? No    FHS-7:   Breast CA Risk Assessment (FHS-7) 8/11/2021   Did any of your first-degree relatives have breast or ovarian cancer? No   Did any of your relatives have bilateral breast cancer? No   Did any man in your family have breast cancer? No   Did any woman in your family have breast and ovarian cancer? No   Did any woman in your family have breast cancer before age 50 y? No   Do you have 2 or more relatives with breast and/or ovarian cancer? No   Do you have 2 or more relatives with breast and/or bowel cancer? No     Pertinent mammograms are reviewed under the imaging tab.    History of abnormal Pap smear: In the past - none recently.   NO - age 30-65 PAP every 5 years  "with negative HPV co-testing recommended     Reviewed and updated as needed this visit by clinical staff  Tobacco  Allergies    Med Hx  Surg Hx  Fam Hx  Soc Hx        Reviewed and updated as needed this visit by Provider                  Review of Systems   Constitutional: Negative for chills and fever.   HENT: Negative for congestion, ear pain, hearing loss and sore throat.    Eyes: Negative for pain and visual disturbance.   Respiratory: Negative for cough and shortness of breath.    Cardiovascular: Negative for chest pain, palpitations and peripheral edema.   Gastrointestinal: Positive for diarrhea. Negative for abdominal pain, constipation, heartburn, hematochezia and nausea.   Breasts:  Negative for tenderness, breast mass and discharge.   Genitourinary: Positive for frequency. Negative for dysuria, genital sores, hematuria, pelvic pain, urgency, vaginal bleeding and vaginal discharge.   Musculoskeletal: Positive for myalgias. Negative for arthralgias and joint swelling.   Skin: Negative for rash.   Neurological: Negative for dizziness, weakness, headaches and paresthesias.   Psychiatric/Behavioral: Negative for mood changes. The patient is nervous/anxious.         OBJECTIVE:   /72 (BP Location: Right arm, Patient Position: Sitting, Cuff Size: Adult Large)   Pulse 96   Temp 97.8  F (36.6  C) (Tympanic)   Resp 18   Ht 1.588 m (5' 2.5\")   Wt 123 kg (271 lb 3.2 oz)   SpO2 94%   BMI 48.81 kg/m    Physical Exam  GENERAL: healthy, alert and no distress  EYES: Eyes grossly normal to inspection, PERRL and conjunctivae and sclerae normal  HENT: ear canals and TM's normal, nose and mouth without ulcers or lesions  NECK: no adenopathy, no asymmetry, masses, or scars and thyroid normal to palpation  RESP: lungs clear to auscultation - no rales, rhonchi or wheezes  CV: regular rate and rhythm, normal S1 S2, no S3 or S4, no murmur, click or rub, no peripheral edema and peripheral pulses strong  ABDOMEN: " soft, nontender, no hepatosplenomegaly, no masses and bowel sounds normal   (female): normal female external genitalia, normal urethral meatus, vaginal mucosa pink, moist, well rugated, and normal cervix/adnexa/uterus without masses or discharge  MS: no gross musculoskeletal defects noted, no edema  SKIN: no suspicious lesions or rashes  NEURO: Normal strength and tone, mentation intact and speech normal  PSYCH: mentation appears normal, affect normal/bright    Diagnostic Test Results:  Labs reviewed in Epic    ASSESSMENT/PLAN:   (Z00.00) Routine general medical examination at a health care facility  (primary encounter diagnosis)  - pap today  - colonoscopy utd  - mammo utd  - flu shot today.  Plan: INFLUENZA VACCINE IM > 6 MONTHS VALENT IIV4         (AFLURIA/FLUZONE)    (E66.01) BMI > 40.0 (H)  Wt Readings from Last 4 Encounters:   09/29/21 123 kg (271 lb 3.2 oz)   06/09/21 121 kg (266 lb 12.8 oz)   05/26/21 120.4 kg (265 lb 6.4 oz)   05/06/21 121 kg (266 lb 11.2 oz)     - Feels ready to commit to this. I do think she would do well w/ structured program. Has worked with Lauren Bloch before. ? Gastric bypass but would want her to talk to her GI prior.  Plan: COMPREHENSIVE METABOLIC PANEL, Comprehensive         Weight Management    (I10) Benign essential hypertension  BP Readings from Last 6 Encounters:   09/29/21 112/72   07/03/21 (!) 130/92   06/14/21 132/83   06/09/21 (!) 142/90   05/26/21 (!) 161/111   05/20/21 (!) 149/96     Plan: Albumin Random Urine Quantitative with Creat         Ratio, COMPREHENSIVE METABOLIC PANEL,         lisinopril-hydrochlorothiazide (ZESTORETIC)         20-12.5 MG tablet, potassium chloride         (KLOR-CON) 20 MEQ packet    (R73.03) Prediabetes  Lab Results   Component Value Date    A1C 5.9 09/29/2021    A1C 5.4 06/25/2020     Plan: HEMOGLOBIN A1C    (K21.00) Gastroesophageal reflux disease with esophagitis, unspecified whether hemorrhage  - omeprazole 20 mg bid    (F41.9)  "Anxiety  Decreased to 10 mg daily and doing well.   PHQ 5/28/2020 9/25/2020 9/28/2021   PHQ-9 Total Score 2 5 4   Q9: Thoughts of better off dead/self-harm past 2 weeks Not at all Not at all Not at all     JAQUELINE-7 SCORE 5/28/2020 9/25/2020 9/28/2021   Total Score - - 2 (minimal anxiety)   Total Score 3 4 2     Plan: escitalopram (LEXAPRO) 10 MG tablet    (B00.9) HSV-2 (herpes simplex virus 2) infection  Rare outbreak w/ stress.  Plan: valACYclovir (VALTREX) 500 MG tablet    (K90.49) Protein losing enteropathy - being worked up by [facility]  Biopsy results reviewed.   Has f/up appt with [facility] next month.   Plan: COMPREHENSIVE METABOLIC PANEL, CBC with         platelets    (Z80.0) Family history of colon cancer - start colonoscopies at age 45    (Z13.220) Screening cholesterol level  The 10-year ASCVD risk score (Gamaliel SOSA Jr., et al., 2013) is: 0.2%    Values used to calculate the score:      Age: 41 years      Sex: Female      Is Non- : No      Diabetic: No      Tobacco smoker: No      Systolic Blood Pressure: 112 mmHg      Is BP treated: Yes      HDL Cholesterol: 69 mg/dL      Total Cholesterol: 160 mg/dL  Plan: Lipid panel reflex to direct LDL Fasting    (Z12.4) Screening for cervical cancer  Plan: PAP screen with HPV - recommended age 30 - 65         years    (Z23) Need for prophylactic vaccination and inoculation against influenza  (Z23) Need for vaccination    Patient has been advised of split billing requirements and indicates understanding: No  COUNSELING:  Reviewed preventive health counseling, as reflected in patient instructions       Regular exercise       Healthy diet/nutrition       Immunizations       Family planning    Estimated body mass index is 48.81 kg/m  as calculated from the following:    Height as of this encounter: 1.588 m (5' 2.5\").    Weight as of this encounter: 123 kg (271 lb 3.2 oz).    Weight management plan: Patient referred to endocrine and/or weight management " specialty    She reports that she has never smoked. She has never used smokeless tobacco.      Counseling Resources:  ATP IV Guidelines  Pooled Cohorts Equation Calculator  Breast Cancer Risk Calculator  BRCA-Related Cancer Risk Assessment: FHS-7 Tool  FRAX Risk Assessment  ICSI Preventive Guidelines  Dietary Guidelines for Americans, 2010  [employer]'s MyPlate  ASA Prophylaxis  Lung CA Screening    Jeff Contreras MD  [facility] [place]  Answers for HPI/ROS submitted by the patient on 9/28/2021  If you checked off any problems, how difficult have these problems made it for you to do your work, take care of things at home, or get along with other people?: Somewhat difficult  PHQ9 TOTAL SCORE: 4  JAQUELINE 7 TOTAL SCORE: 2

## 2021-09-29 NOTE — PATIENT INSTRUCTIONS
Ask Dr. Gilliam about gastric bypass     I think the Comprehensive Weight Management Program is a GREAT idea. Give them a call to schedule.     I'll see you in 6 months - call if you need anything.    Preventive Health Recommendations  Female Ages 40 to 49    Yearly exam:     See your health care provider every year in order to  1. Review health changes.   2. Discuss preventive care.    3. Review your medicines if your doctor prescribed any.      Get a Pap test every three years (unless you have an abnormal result and your provider advises testing more often).      If you get Pap tests with HPV test, you only need to test every 5 years, unless you have an abnormal result. You do not need a Pap test if your uterus was removed (hysterectomy) and you have not had cancer.      You should be tested each year for STDs (sexually transmitted diseases), if you're at risk.     Ask your doctor if you should have a mammogram.      Have a colonoscopy (test for colon cancer) if someone in your family has had colon cancer or polyps before age 50.       Have a cholesterol test every 5 years.       Have a diabetes test (fasting glucose) after age 45. If you are at risk for diabetes, you should have this test every 3 years.    Shots: Get a flu shot each year. Get a tetanus shot every 10 years.     Nutrition:     Eat at least 5 servings of fruits and vegetables each day.    Eat whole-grain bread, whole-wheat pasta and brown rice instead of white grains and rice.    Get adequate Calcium and Vitamin D.      Lifestyle    Exercise at least 150 minutes a week (an average of 30 minutes a day, 5 days a week). This will help you control your weight and prevent disease.    Limit alcohol to one drink per day.    No smoking.     Wear sunscreen to prevent skin cancer.    See your dentist every six months for an exam and cleaning.

## 2021-09-30 LAB
ALBUMIN SERPL-MCNC: 3.2 G/DL (ref 3.4–5)
ALP SERPL-CCNC: 61 U/L (ref 40–150)
ALT SERPL W P-5'-P-CCNC: 34 U/L (ref 0–50)
ANION GAP SERPL CALCULATED.3IONS-SCNC: 10 MMOL/L (ref 3–14)
AST SERPL W P-5'-P-CCNC: 22 U/L (ref 0–45)
BILIRUB SERPL-MCNC: 0.6 MG/DL (ref 0.2–1.3)
BUN SERPL-MCNC: 16 MG/DL (ref 7–30)
CALCIUM SERPL-MCNC: 8.3 MG/DL (ref 8.5–10.1)
CHLORIDE BLD-SCNC: 105 MMOL/L (ref 94–109)
CHOLEST SERPL-MCNC: 145 MG/DL
CO2 SERPL-SCNC: 22 MMOL/L (ref 20–32)
CREAT SERPL-MCNC: 0.78 MG/DL (ref 0.52–1.04)
FASTING STATUS PATIENT QL REPORTED: YES
GFR SERPL CREATININE-BSD FRML MDRD: >90 ML/MIN/1.73M2
GLUCOSE BLD-MCNC: 83 MG/DL (ref 70–99)
HDLC SERPL-MCNC: 52 MG/DL
LDLC SERPL CALC-MCNC: 78 MG/DL
NONHDLC SERPL-MCNC: 93 MG/DL
POTASSIUM BLD-SCNC: 3.9 MMOL/L (ref 3.4–5.3)
PROT SERPL-MCNC: 7.4 G/DL (ref 6.8–8.8)
SODIUM SERPL-SCNC: 137 MMOL/L (ref 133–144)
TRIGL SERPL-MCNC: 77 MG/DL

## 2021-10-01 LAB
BKR LAB AP GYN ADEQUACY: NORMAL
BKR LAB AP GYN INTERPRETATION: NORMAL
BKR LAB AP HPV REFLEX: NORMAL
BKR LAB AP PREVIOUS ABNORMAL: NORMAL
PATH REPORT.COMMENTS IMP SPEC: NORMAL
PATH REPORT.RELEVANT HX SPEC: NORMAL

## 2021-10-06 LAB
HUMAN PAPILLOMA VIRUS 16 DNA: NEGATIVE
HUMAN PAPILLOMA VIRUS 18 DNA: NEGATIVE
HUMAN PAPILLOMA VIRUS FINAL DIAGNOSIS: NORMAL
HUMAN PAPILLOMA VIRUS OTHER HR: NEGATIVE

## 2021-10-07 NOTE — ED NOTES
"[facility]  ED Nurse Handoff Report    Dora Barrera is a 40 year old female   ED Chief complaint: Chest Pain  . ED Diagnosis:   Final diagnoses:   Pleural effusion     Allergies: No Known Allergies    Code Status: Full Code  Activity level - Baseline/Home:  Independent. Activity Level - Current:   Independent. Lift room needed: No. Bariatric: No   Needed: No   Isolation: No. Infection: Not Applicable.     Vital Signs:   Vitals:    04/22/21 1406 04/22/21 1630 04/22/21 1648   BP: (!) 180/117 (!) 176/110 (!) 162/100   BP Location:  Left arm Left arm   Cuff Size:  Adult Large Adult Large   Pulse: 104 103 92   Resp: 20     Temp: 97  F (36.1  C)     TempSrc: Temporal     SpO2: 97% 95% 93%   Weight: 122.5 kg (270 lb)     Height: 1.575 m (5' 2\")         Cardiac Rhythm:  ,      Pain level:    Patient confused: No. Patient Falls Risk: No.   Elimination Status: Has not voided   Patient Report - Initial Complaint: Pt arrives seen prior to ED visit at urgency room. She saw Dr. Romo who spoke with White pt sent for possible thoracentesis. Labs drawn PTA, pt on birth control having ongoing right sided chest pain large pleural effusion noted on imaging. BP elevated, A/o x 4.   Focused Assessment: Respiratory - Respiratory WDL: .WDL except; rhythm/pattern  Rhythm/Pattern, Respiratory: dyspnea on exertion (pain with deep inspiration on right side) Breath Sounds: All Fields; RLL  RLL Breath Sounds: diminished   Head To Toe Assessment - All Lung Fields Breath Sounds: Posterior:; clear   Tests Performed: Labs, COVID, US thoracenteis  Pt was in Radiology today for right thoracentesis. Procedure performed by  Dr Crespo. There were no complications during procedure and vitals remained stable throughout. Pt tolerated procedure well, 875  cc's of dark adam colored fluid was removed from right pleural space. Specimen to lab for testing. Pt then was given written and verbal instructions. Pt left department in " stable and satisfactory condition with US technologist.   Abnormal Results: Abnormal Labs Reviewed - No abnormal labs to display    Treatments provided: US thoracentesis  Family Comments: N/A  OBS brochure/video discussed/provided to patient:  N/A  ED Medications:   Medications   lidocaine (PF) (XYLOCAINE) 1 % injection 10 mL (10 mLs Subcutaneous Given 4/22/21 1761)     Drips infusing:  No  For the majority of the shift, the patient's behavior Green. Interventions performed were N/A.    Sepsis treatment initiated: No     Patient tested for COVID 19 prior to admission: YES    ED Nurse Name/Phone Number: Sherry Lyon RN,   4:54 PM       Other

## 2021-10-21 ENCOUNTER — TRANSFERRED RECORDS (OUTPATIENT)
Dept: HEALTH INFORMATION MANAGEMENT | Facility: CLINIC | Age: 41
End: 2021-10-21
Payer: COMMERCIAL

## 2021-10-21 LAB
ALT SERPL-CCNC: 19 IU/L (ref 0–32)
AST SERPL-CCNC: 18 IU/L (ref 0–40)
CREATININE (EXTERNAL): 0.56 MG/DL (ref 0.57–1)
GFR ESTIMATED (EXTERNAL): 117 ML/MIN/1.73M2
GFR ESTIMATED (IF AFRICAN AMERICAN) (EXTERNAL): 135 ML/MIN/1.73M2
GLUCOSE (EXTERNAL): 88 MG/DL (ref 65–99)
POTASSIUM (EXTERNAL): 4.3 MMOL/L (ref 3.5–5.2)

## 2022-01-04 ENCOUNTER — NURSE TRIAGE (OUTPATIENT)
Dept: PEDIATRICS | Facility: CLINIC | Age: 42
End: 2022-01-04

## 2022-01-04 ENCOUNTER — OFFICE VISIT (OUTPATIENT)
Dept: PEDIATRICS | Facility: CLINIC | Age: 42
End: 2022-01-04
Payer: COMMERCIAL

## 2022-01-04 VITALS
DIASTOLIC BLOOD PRESSURE: 76 MMHG | BODY MASS INDEX: 49.14 KG/M2 | WEIGHT: 273 LBS | RESPIRATION RATE: 16 BRPM | OXYGEN SATURATION: 97 % | TEMPERATURE: 97.3 F | HEART RATE: 85 BPM | SYSTOLIC BLOOD PRESSURE: 112 MMHG

## 2022-01-04 DIAGNOSIS — R73.03 PREDIABETES: ICD-10-CM

## 2022-01-04 DIAGNOSIS — R00.2 PALPITATIONS: Primary | ICD-10-CM

## 2022-01-04 LAB
ANION GAP SERPL CALCULATED.3IONS-SCNC: 7 MMOL/L (ref 3–14)
BASOPHILS # BLD AUTO: 0 10E3/UL (ref 0–0.2)
BASOPHILS NFR BLD AUTO: 0 %
BUN SERPL-MCNC: 20 MG/DL (ref 7–30)
CALCIUM SERPL-MCNC: 8.8 MG/DL (ref 8.5–10.1)
CHLORIDE BLD-SCNC: 105 MMOL/L (ref 94–109)
CO2 SERPL-SCNC: 28 MMOL/L (ref 20–32)
CREAT SERPL-MCNC: 0.68 MG/DL (ref 0.52–1.04)
EOSINOPHIL # BLD AUTO: 0.2 10E3/UL (ref 0–0.7)
EOSINOPHIL NFR BLD AUTO: 1 %
ERYTHROCYTE [DISTWIDTH] IN BLOOD BY AUTOMATED COUNT: 14.9 % (ref 10–15)
GFR SERPL CREATININE-BSD FRML MDRD: >90 ML/MIN/1.73M2
GLUCOSE BLD-MCNC: 107 MG/DL (ref 70–99)
HBA1C MFR BLD: 6.3 % (ref 0–5.6)
HCT VFR BLD AUTO: 40.6 % (ref 35–47)
HGB BLD-MCNC: 12.8 G/DL (ref 11.7–15.7)
LYMPHOCYTES # BLD AUTO: 3.2 10E3/UL (ref 0.8–5.3)
LYMPHOCYTES NFR BLD AUTO: 22 %
MCH RBC QN AUTO: 27.6 PG (ref 26.5–33)
MCHC RBC AUTO-ENTMCNC: 31.5 G/DL (ref 31.5–36.5)
MCV RBC AUTO: 88 FL (ref 78–100)
MONOCYTES # BLD AUTO: 0.7 10E3/UL (ref 0–1.3)
MONOCYTES NFR BLD AUTO: 5 %
NEUTROPHILS # BLD AUTO: 10.3 10E3/UL (ref 1.6–8.3)
NEUTROPHILS NFR BLD AUTO: 71 %
PLATELET # BLD AUTO: 547 10E3/UL (ref 150–450)
POTASSIUM BLD-SCNC: 3.7 MMOL/L (ref 3.4–5.3)
RBC # BLD AUTO: 4.63 10E6/UL (ref 3.8–5.2)
SODIUM SERPL-SCNC: 140 MMOL/L (ref 133–144)
TSH SERPL DL<=0.005 MIU/L-ACNC: 1.77 MU/L (ref 0.4–4)
WBC # BLD AUTO: 14.4 10E3/UL (ref 4–11)

## 2022-01-04 PROCEDURE — 84443 ASSAY THYROID STIM HORMONE: CPT | Performed by: PHYSICIAN ASSISTANT

## 2022-01-04 PROCEDURE — 36415 COLL VENOUS BLD VENIPUNCTURE: CPT | Performed by: PHYSICIAN ASSISTANT

## 2022-01-04 PROCEDURE — 83036 HEMOGLOBIN GLYCOSYLATED A1C: CPT | Performed by: PHYSICIAN ASSISTANT

## 2022-01-04 PROCEDURE — 85025 COMPLETE CBC W/AUTO DIFF WBC: CPT | Performed by: PHYSICIAN ASSISTANT

## 2022-01-04 PROCEDURE — 99214 OFFICE O/P EST MOD 30 MIN: CPT | Performed by: PHYSICIAN ASSISTANT

## 2022-01-04 PROCEDURE — 93000 ELECTROCARDIOGRAM COMPLETE: CPT | Performed by: PHYSICIAN ASSISTANT

## 2022-01-04 PROCEDURE — 80048 BASIC METABOLIC PNL TOTAL CA: CPT | Performed by: PHYSICIAN ASSISTANT

## 2022-01-04 NOTE — PROGRESS NOTES
"  Assessment & Plan     Palpitations  Proceed with labs for further evaluation.  If labs normal, proceed with holter.   - EKG 12-lead complete w/read - Clinics  - Basic metabolic panel  (Ca, Cl, CO2, Creat, Gluc, K, Na, BUN); Future  - TSH with free T4 reflex; Future  - CBC with platelets and differential; Future  - Basic metabolic panel  (Ca, Cl, CO2, Creat, Gluc, K, Na, BUN)  - TSH with free T4 reflex  - CBC with platelets and differential  - Leadless EKG Monitor 3 to 7 Days; Future    Prediabetes  Labs pending.  - Hemoglobin A1c; Future  - Hemoglobin A1c    SANDEE Majano [facility] [place] is a 41 year old who presents for the following health issues:    History of anxiety, HTN, pleural effusion, GERD.     Forgot to take potassium in the last three months.     HPI   palpitations  Onset/Duration: a few days  Occurs every 15 min--describes as a \"skip, flutter, pause\" yesterday  Instantaneous  Improved today  Description:   Location: none  Character: none  Radiation: none  Duration: a few days   Intensity: moderate  Progression of Symptoms: same  Accompanying Signs & Symptoms:  Shortness of breath: no  Sweating: no  Nausea/vomiting: no  Lightheadedness: YES, a few times  Palpitations: YES- pt states that she is more aware of heart beating and almost feels that it is slower  Fever/Chills: no  Cough: no           Heartburn: no  History:   Family history of heart disease: YES, dad--CAD  Tobacco use: no  Previous similar symptoms: no  Precipitating factors:   Worse with exertion: no  Worse with deep breaths: no           Related to eating: no           Better with burping: no  Alleviating factors: none  Therapies tried and outcome: none    History of pre-diabetes.    Review of Systems   Constitutional, HEENT, cardiovascular, pulmonary, gi and gu systems are negative, except as otherwise noted.      Objective    /76   Pulse 85   Temp 97.3  F (36.3  C)   Resp " 16   Wt 123.8 kg (273 lb)   SpO2 97%   BMI 49.14 kg/m    Body mass index is 49.14 kg/m .  Physical Exam   GENERAL: alert and no distress  EYES: Eyes grossly normal to inspection, PERRL and conjunctivae and sclerae normal  HENT: ear canals and TM's normal, nose and mouth without ulcers or lesions  NECK: no adenopathy  RESP: lungs clear to auscultation - no rales, rhonchi or wheezes  CV: regular rate and rhythm, normal S1 S2, no S3 or S4    Results for orders placed or performed in visit on 01/04/22   Basic metabolic panel  (Ca, Cl, CO2, Creat, Gluc, K, Na, BUN)     Status: Abnormal   Result Value Ref Range    Sodium 140 133 - 144 mmol/L    Potassium 3.7 3.4 - 5.3 mmol/L    Chloride 105 94 - 109 mmol/L    Carbon Dioxide (CO2) 28 20 - 32 mmol/L    Anion Gap 7 3 - 14 mmol/L    Urea Nitrogen 20 7 - 30 mg/dL    Creatinine 0.68 0.52 - 1.04 mg/dL    Calcium 8.8 8.5 - 10.1 mg/dL    Glucose 107 (H) 70 - 99 mg/dL    GFR Estimate >90 >60 mL/min/1.73m2   TSH with free T4 reflex     Status: Normal   Result Value Ref Range    TSH 1.77 0.40 - 4.00 mU/L   Hemoglobin A1c     Status: Abnormal   Result Value Ref Range    Hemoglobin A1C 6.3 (H) 0.0 - 5.6 %   CBC with platelets and differential     Status: Abnormal   Result Value Ref Range    WBC Count 14.4 (H) 4.0 - 11.0 10e3/uL    RBC Count 4.63 3.80 - 5.20 10e6/uL    Hemoglobin 12.8 11.7 - 15.7 g/dL    Hematocrit 40.6 35.0 - 47.0 %    MCV 88 78 - 100 fL    MCH 27.6 26.5 - 33.0 pg    MCHC 31.5 31.5 - 36.5 g/dL    RDW 14.9 10.0 - 15.0 %    Platelet Count 547 (H) 150 - 450 10e3/uL    % Neutrophils 71 %    % Lymphocytes 22 %    % Monocytes 5 %    % Eosinophils 1 %    % Basophils 0 %    Absolute Neutrophils 10.3 (H) 1.6 - 8.3 10e3/uL    Absolute Lymphocytes 3.2 0.8 - 5.3 10e3/uL    Absolute Monocytes 0.7 0.0 - 1.3 10e3/uL    Absolute Eosinophils 0.2 0.0 - 0.7 10e3/uL    Absolute Basophils 0.0 0.0 - 0.2 10e3/uL   CBC with platelets and differential     Status: Abnormal    Narrative     The following orders were created for panel order CBC with platelets and differential.  Procedure                               Abnormality         Status                     ---------                               -----------         ------                     CBC with platelets and d...[[id number]]  Abnormal            Final result                 Please view results for these tests on the individual orders.

## 2022-01-04 NOTE — LETTER
January 4, 2022      Dora Barrera  [address]  [place] [address]        To Whom It May Concern:    Dora Barrera  was seen on 1/4/21.  Please allow her to work from home the remainder of the day.         Sincerely,        Gosia Russell PA-C

## 2022-01-04 NOTE — CONFIDENTIAL NOTE
"Huddled with Dr. Mimi Zafar for patient to be seen in clinic.   If any red flag symptoms develop, patient should go to the ER.     Patient reporting palpitations starting occasionally over the weekend with increase in occurrence starting yesterday.       All day yesterday, every 15 minutes she felt her heart go \"boom boom\".   Today its happening every 30 minutes.   Symptoms do not change or worsen with exertion or rest.     Has been feeling hot.   Feels that her heart rate is slower, not fast.   Developed right shoulder pain, located on the top of shoulder. Has felt this before after surgery. Increased today. Painful to move.     Denies chest, dizziness, sob, lightheadedness, weakness, confusion.     Stopped her potassium 3 months ago.         "

## 2022-01-04 NOTE — TELEPHONE ENCOUNTER
Left voicemail asking patient to call me back directly.     Patient sent this [employer] message. Would like to Triage further.       To: CHUY SB2/3/4 TEAM B (NIKOLE)      From: Dora Barrera      Created: 1/4/2022 7:48 AM        *-*-*This message has not been handled.*-*-*    Morning! I have been aware of my heart this weekend and yesterday with a skipped beat type thing. Also I haven t taken the potassium since my annual I realized and wondering if that is an influence and I should also have labs done. Plus lots of stress at work since December so all could be influencing this awareness of my heart. Should I start w labs? I am working on [place]. Thanks Dora

## 2022-01-08 ENCOUNTER — MYC MEDICAL ADVICE (OUTPATIENT)
Dept: PEDIATRICS | Facility: CLINIC | Age: 42
End: 2022-01-08

## 2022-01-08 NOTE — LETTER
January 14, 2022      Dora Barrera  [address] [place] [address]      Dear Dora,    Given you tested positive for covid 19 I would not recommend repeating a PCR test at this point as it can remain positive for up to 90 days. I would follow the [facility] Guidelines as to when/how to return to work and and employer-specific requests for rapid antigen testing.     From the [facility]: [email or web address]/sick.html#mask  How long to stay home  To find out how long you need to stay home (isolate), read the category below that describes you:    People who can wear a well-fitting mask  Wear a well-fitting mask for 10 days when around others, even at home. The 10 days start the day after you got symptoms. If you do not have symptoms, the 10 days start the day after you got tested.    If you have symptoms:    Stay home until all three of the following are true:    It has been five days since you first felt sick. Day zero is the day your symptoms started. Day one is the first full day after your symptoms started.  You have had no fever (your temperature is 100.4 degrees Fahrenheit or lower) for at least 24 hours, without using medicine that lowers fevers.  You feel better. Your cough, shortness of breath, or other symptoms are better.  You can resume most activities on day six. If you do not feel better at the end of five days, continue to stay home until all the above are true.    If you do not have symptoms:    Stay home and away from others for five days after your test date. Day zero is the day you got tested.  If you develop symptoms during this time, you must start over. Day zero is the day your symptoms start. Day one is the first full day after your symptoms started. Refer to the above section above on what to do if you have symptoms.  After your period to stay home ends, continue to do the following for another five days:    Continue to wear a well-fitted mask, even at home.  Do  not be around others who are at risk for getting very sick from the COVID-19 virus, including older adults, those living in long-term care facilities, and people with health conditions like asthma, diabetes, heart disease, obesity, or weakened immune systems.  Do not go to places where you will need to take off your mask to take part in an activity (e.g., gyms, restaurants) and avoid eating near others at home and work.  Avoid travel. If you must travel after your period to stay home ends, wear a well-fitting mask.      RACHEL Contreras MD  Internal Medicine-Pediatrics

## 2022-01-09 ENCOUNTER — NURSE TRIAGE (OUTPATIENT)
Dept: NURSING | Facility: CLINIC | Age: 42
End: 2022-01-09

## 2022-01-09 ENCOUNTER — E-VISIT (OUTPATIENT)
Dept: FAMILY MEDICINE | Facility: CLINIC | Age: 42
End: 2022-01-09
Payer: COMMERCIAL

## 2022-01-09 DIAGNOSIS — Z20.822 SUSPECTED COVID-19 VIRUS INFECTION: Primary | ICD-10-CM

## 2022-01-09 PROCEDURE — 99421 OL DIG E/M SVC 5-10 MIN: CPT | Performed by: PHYSICIAN ASSISTANT

## 2022-01-09 NOTE — TELEPHONE ENCOUNTER
Patient calling - says there are several people at her work out ill, some of which have tested positive for COVID19.  Says she developed a sore throat Thursday.  She had an at home test for COVID19 on Friday morning that was negative.  She developed a fever Friday night.  Highest temperature has been 100.8.  Has cough and congestion that started yesterday.   Patient is asking about PCR COVID19 testing as well as influenza testing.    No difficulty breathing.  No chest pain.    Triaged to disposition of Call PCP Within 24 Hours.  Advised patient of e-visit option to connect with provider for orders on COVID19 and/or influenza testing.  Care advice and isolation recommendations given per protocol.  Advised to call back if symptoms worsen.    Ruthie Jung RN  Triage Nurse Advisor      COVID-19 testing at [facility] is by appointment only. You'll need to schedule a time to get tested. If you have symptoms (signs) of COVID, please log in to [employer] to complete an e-visit (virtual visit). This is the first step to getting tested.    If you don't have COVID symptoms and want to get tested, you should also log in to [employer] for an e-visit. This includes people who:    have had close contact with a COVID-positive person    want to be tested before or after travel    have taken part in high-risk activities    have a school testing mandate, or     were told to get tested by their care team or the health department.     A [employer] e-visit is the fastest way for you to be seen by our care team. Please choose  Next available provider  to complete an e-visit. When you choose this option, the average response time is less than one hour.  After the e-visit, you'll be able to self-schedule your test at one of our testing locations. To learn more about our testing locations or for other details, please visit our COVID-19 Resource Hub.    [employer] is also the fastest way to get your test results. You'll get your results in  i3 membrane within 3 days. If you don't use i[employer] membrane, you'll get your results in the mail in 7 to 10 days. If your test is positive and you don't view your result in [employer] within 1 business day, you'll get a phone call with your result. A positive result means that you have COVID-19.    If you have an upcoming procedure at [facility], you'll need to be tested for COVID. The test needs to happen 2 to 4 days before your procedure. If you have an upcoming procedure, we will contact you to schedule a COVID test.    If you don't have a [employer] account, please call [phone] to set up a virtual visit. You can also find community testing sites in [place] at [place]/get-tested/testing-locations. If you live in [place], please visit www.[facility].[place].gov/covid-19/community-testing.htm.    COVID 19 Nurse Triage Plan/Patient Instructions    Please be aware that novel coronavirus (COVID-19) may be circulating in the community. If you develop symptoms such as fever, cough, or SOB or if you have concerns about the presence of another infection including coronavirus (COVID-19), please contact your health care provider or visit [email or web address].     Disposition/Instructions    Virtual Visit with provider recommended. Reference Visit Selection Guide.    Thank you for taking steps to prevent the spread of this virus.  o Limit your contact with others.  o Wear a simple mask to cover your cough.  o Wash your hands well and often.    Resources    [facility]: About COVID-19: [email or web address]    CDC: What to Do If You're Sick: www.cdc.gov/coronavirus/2019-ncov/about/steps-when-sick.html    CDC: Ending Home Isolation: www.cdc.gov/coronavirus/2019-ncov/hcp/disposition-in-home-patients.html     CDC: Caring for Someone: www.cdc.gov/coronavirus/2019-ncov/if-you-are-sick/care-for-someone.html     MD: Interim Guidance for Hospital Discharge to Home:  www.health.[facility].[place]/diseases/coronavirus/hcp/hospdischarge.pdf    [facility] clinical trials (COVID-19 research studies): clinicalaffairs.[facility]/n-clinical-trials     Below are the COVID-19 hotlines at the [place] Department of Health ([facility]). Interpreters are available.   o For health questions: Call 946-220-0283 or 1-402.636.2038 (7 a.m. to 7 p.m.)  o For questions about schools and childcare: Call 724-091-0138 or 1-628.455.1975 (7 a.m. to 7 p.m.)     Reason for Disposition    [1] COVID-19 infection suspected by caller or triager AND [2] mild symptoms (cough, fever, or others) AND [3] negative COVID-19 rapid test    Additional Information    Negative: SEVERE difficulty breathing (e.g., struggling for each breath, speaks in single words)    Negative: Difficult to awaken or acting confused (e.g., disoriented, slurred speech)    Negative: Bluish (or gray) lips or face now    Negative: Shock suspected (e.g., cold/pale/clammy skin, too weak to stand, low BP, rapid pulse)    Negative: Sounds like a life-threatening emergency to the triager    Negative: [1] COVID-19 exposure AND [2] no symptoms    Negative: COVID-19 vaccine reaction suspected (e.g., fever, headache, muscle aches) occurring 1 to 3 days after getting vaccine    Negative: COVID-19 vaccine, questions about    Negative: [1] Lives with someone known to have influenza (flu test positive) AND [2] flu-like symptoms (e.g., cough, runny nose, sore throat, SOB; with or without fever)    Negative: [1] Adult with possible COVID-19 symptoms AND [2] triager concerned about severity of symptoms or other causes    Negative: COVID-19 and breastfeeding, questions about    Negative: SEVERE or constant chest pain or pressure (Exception: mild central chest pain, present only when coughing)    Negative: MODERATE difficulty breathing (e.g., speaks in phrases, SOB even at rest, pulse 100-120)    Negative: [1] Headache AND [2] stiff neck (can't touch chin to  chest)    Negative: MILD difficulty breathing (e.g., minimal/no SOB at rest, SOB with walking, pulse <100)    Negative: Chest pain or pressure    Negative: Patient sounds very sick or weak to the triager    Negative: Fever > 103 F (39.4 C)    Negative: [1] Fever > 101 F (38.3 C) AND [2] age > 60 years    Negative: [1] Fever > 100.0 F (37.8 C) AND [2] bedridden (e.g., nursing home patient, CVA, chronic illness, recovering from surgery)    Negative: [1] HIGH RISK patient AND [2] influenza is widespread in the community AND [3] ONE OR MORE respiratory symptoms: cough, sore throat, runny or stuffy nose    Negative: HIGH RISK for severe COVID complications (e.g., age > 64 years, obesity with BMI > 25, pregnant, chronic lung disease or other chronic medical condition)  (Exception: Already seen by PCP and no new or worsening symptoms.)    Negative: [1] HIGH RISK patient AND [2] influenza exposure within the last 7 days AND [3] ONE OR MORE respiratory symptoms: cough, sore throat, runny or stuffy nose    Protocols used: CORONAVIRUS (COVID-19) DIAGNOSED OR [id number] 8.25.2021

## 2022-01-09 NOTE — LETTER
January 9, 2022      Dora Barrera  [address] [place] [address]      To Whom It May Concern:  Please excuse patient until they receive a negative COVID test. Upon negative test, they may return.     Sincerely,        James Fitzgerald PA-C

## 2022-01-09 NOTE — PATIENT INSTRUCTIONS
Dora,      Based on your responses, you may have coronavirus (COVID-19). This illness can cause fever, cough and trouble breathing. Many people get a mild case and get better on their own. Some people can get very sick.    Will I be tested for COVID-19?  We would like to test you for COVID-19 virus. I have placed orders for this test.     To schedule: go to your [employer] home page and scroll down to the section that says  You have an appointment that needs to be scheduled  and click the large green button that says  Schedule Now  and follow the steps to find the next available openings.    If you are unable to complete these [employer] scheduling steps, please call [phone] to schedule your testing.     Return to work/school/ guidance:  Please let your workplace manager and staffing office know when your isolation ends.       If you receive a positive COVID-19 test result, follow the guidance of the those who are giving you the results. Usually the return to work is 10 days from symptom onset or positive test date, (or in some cases 20 days if you are immunocompromised). If your symptoms started after your positive test, the 10 days should start when your symptoms started.   o If you work at [employer], you must also be cleared by Employee Occupational Health and Safety to return to work.      If you receive a negative COVID-19 test result and did not have a high risk exposure to someone with a known positive COVID-19 test, you can return to work once you're free of fever for 24 hours without fever-reducing medication and your symptoms are improving or resolved.    If you receive a negative COVID-19 test and had a high-risk exposure to someone who has tested positive for COVID-19 then you can return to work 14 days after your last contact with the positive individual. Follow quarantine guidance given by your doctor or public health officials.     Sign up for Get[employer] Loop:  We know it's scary to hear  that you might have COVID-19. We want to track your symptoms to make sure you're okay over the next 2 weeks. Please look for an email from [employer]--this is a free, online program that we'll use to keep in touch. To sign up, follow the link in the email you will receive. Learn more at [email or web address]    How can I take care of myself?  Over the counter medications may help with your symptoms like congestion, cough, chills, or fever.   There are not many effective prescription treatments for early COVID-19. Hydroxychloroquine, ivermectin, and azithromycin are not effective or recommended for COVID-19.    If your symptoms started in the last 10 days, you may be able to receive a treatment with monoclonal antibodies. This treatment can lower your risk of severe illness and going to the hospital. It is given through an IV or under your skin (subcutaneous) and must be given at an infusion center. You must be 12 or older, weight at least 88 pounds, and have a positive COVID-19 test.     If you would like to sign up to be considered to receive the monoclonal antibody medicine, please complete a participation form through the [facility] here: MNRAP ([email or web address]://www.health.[facility].[place].us/diseases/coronavirus/mnrap.html). You may also call the [facility] COVID-19 Public Hotline at 1-611.435.4498 (open Mon-Fri: 9am-7pm and Sat: 10am-6pm).     Not all people who are eligible will receive the medicine, since supply is limited. You will be contacted in the next 1 to 2 business days only if you are selected. If you do not receive a call, you have not been selected to receive the medicine. If you have any questions about this medication, please contact your primary care provider. For more information, see https://www.health.[facility].[place]/diseases/coronavirus/meds.pdf      Get lots of rest. Drink extra fluids (unless a doctor has told you not to)    Take Tylenol (acetaminophen) or ibuprofen for fever or  pain. If you have liver or kidney problems, ask your family doctor if it's okay to take Tylenol o ibuprofen    Take over the counter medications for your symptoms, as directed by your doctor. You may also talk to your pharmacist.      If you have other health problems (like cancer, heart failure, an organ transplant or severe kidney disease): Call your specialty clinic if you don't feel better in the next 2 days.    Know when to call 911. Emergency warning signs include:  o Trouble breathing or shortness of breath  o Pain or pressure in the chest that doesn't go away  o Feeling confused like you haven't felt before, or not being able to wake up  o Bluish-colored lips or face    Where can I get more information?     [employer] - About COVID-19: [email or web address]."[email or web address]/[email or web address]/     CDC - What to Do If You're Sick:     www.cdc.gov/coronavirus/2019-ncov/about/steps-when-sick.html    CDC - Ending Home Isolation:  https://www.cdc.gov/coronavirus/2019-ncov/your-health/quarantine-isolation.html    CDC - Caring for Someone:  www.cdc.gov/coronavirus/2019-ncov/if-you-are-sick/care-for-someone.html    [facility] clinical trials (COVID-19 research studies): clinicalaffairs.[facility]-clinical-trials    Below are the COVID-19 hotlines at the [facility] ([facility]). Interpreters are available.  o For health questions: Call 071-527-7431 or 1-202.367.7659 (7 a.m. to 7 p.m.)  o For questions about schools and childcare: Call 664-116-8490 or 1-594.540.8694 (7 a.m. to 7 p.m.)

## 2022-01-11 ENCOUNTER — LAB (OUTPATIENT)
Dept: FAMILY MEDICINE | Facility: CLINIC | Age: 42
End: 2022-01-11
Attending: PHYSICIAN ASSISTANT
Payer: COMMERCIAL

## 2022-01-11 DIAGNOSIS — Z20.822 SUSPECTED COVID-19 VIRUS INFECTION: ICD-10-CM

## 2022-01-11 LAB — SARS-COV-2 RNA RESP QL NAA+PROBE: POSITIVE

## 2022-01-11 PROCEDURE — U0005 INFEC AGEN DETEC AMPLI PROBE: HCPCS

## 2022-01-11 PROCEDURE — U0003 INFECTIOUS AGENT DETECTION BY NUCLEIC ACID (DNA OR RNA); SEVERE ACUTE RESPIRATORY SYNDROME CORONAVIRUS 2 (SARS-COV-2) (CORONAVIRUS DISEASE [COVID-19]), AMPLIFIED PROBE TECHNIQUE, MAKING USE OF HIGH THROUGHPUT TECHNOLOGIES AS DESCRIBED BY CMS-2020-01-R: HCPCS

## 2022-01-11 NOTE — PROGRESS NOTES
COVID-19 PCR test completed. Patient handout For Patients Who Have Been Tested for Covid-19 (Coronavirus) was given to the patient, which includes test result notification process.    
Home

## 2022-03-30 ENCOUNTER — OFFICE VISIT (OUTPATIENT)
Dept: PEDIATRICS | Facility: CLINIC | Age: 42
End: 2022-03-30
Payer: COMMERCIAL

## 2022-03-30 VITALS
OXYGEN SATURATION: 98 % | HEART RATE: 101 BPM | WEIGHT: 278 LBS | TEMPERATURE: 98 F | BODY MASS INDEX: 49.26 KG/M2 | DIASTOLIC BLOOD PRESSURE: 72 MMHG | RESPIRATION RATE: 16 BRPM | HEIGHT: 63 IN | SYSTOLIC BLOOD PRESSURE: 118 MMHG

## 2022-03-30 DIAGNOSIS — K90.49 PROTEIN LOSING ENTEROPATHY: ICD-10-CM

## 2022-03-30 DIAGNOSIS — R73.03 PREDIABETES: Primary | ICD-10-CM

## 2022-03-30 DIAGNOSIS — E66.01 MORBID OBESITY (H): ICD-10-CM

## 2022-03-30 DIAGNOSIS — E77.8 HYPOPROTEINEMIA (H): ICD-10-CM

## 2022-03-30 DIAGNOSIS — Z13.220 SCREENING CHOLESTEROL LEVEL: ICD-10-CM

## 2022-03-30 LAB — HBA1C MFR BLD: 6.1 % (ref 0–5.6)

## 2022-03-30 PROCEDURE — 36415 COLL VENOUS BLD VENIPUNCTURE: CPT | Performed by: INTERNAL MEDICINE

## 2022-03-30 PROCEDURE — 80061 LIPID PANEL: CPT | Performed by: INTERNAL MEDICINE

## 2022-03-30 PROCEDURE — 83036 HEMOGLOBIN GLYCOSYLATED A1C: CPT | Performed by: INTERNAL MEDICINE

## 2022-03-30 PROCEDURE — 99213 OFFICE O/P EST LOW 20 MIN: CPT | Performed by: INTERNAL MEDICINE

## 2022-03-30 RX ORDER — TOPIRAMATE 25 MG/1
TABLET, FILM COATED ORAL
Qty: 90 TABLET | Refills: 1 | Status: SHIPPED | OUTPATIENT
Start: 2022-03-30 | End: 2022-05-13

## 2022-03-30 NOTE — PATIENT INSTRUCTIONS
I'm glad to see you!    You are worth it and doing things to help even if it doesn't always feel like it.     1. Restarting topamax. We'll do a virtual visit in 6 weeks to see how this is going.   2. Referral to Weight Management Clinic - they do have a [place] location! You will need to call them to schedule. Ask how much can be done virtually.   3. Referral for counseling. [facility] will call you but can also check out [facility] in [place] ([email or web address]).     My team will call in a few weeks to see how you're doing and if we can help schedule any of your appointments.

## 2022-03-30 NOTE — Clinical Note
Please call in 2 weeks - ?schedule with therapy, ?schedule with weight management clinic (can they do virtual)?, started topamax and remember to take it? Thanks! EDUARDO

## 2022-03-30 NOTE — PROGRESS NOTES
"  Assessment & Plan       ICD-10-CM    1. Prediabetes  R73.03 Hemoglobin A1c     Hemoglobin A1c   2. BMI > 40.0 (H)  E66.01 Comprehensive Weight Management     topiramate (TOPAMAX) 25 MG tablet   3. Screening cholesterol level  Z13.220 Lipid panel reflex to direct LDL Fasting     Lipid panel reflex to direct LDL Fasting   4. Protein losing enteropathy - being worked up by CHARLIE SANTIAGO  K90.49    5. 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 Small steps, one day at a time. I do think she would do well w/ a structured program - hopefully [facility] location will work better. Restart topamax, f/up virtual visit.     Still following with CHARLIE SANTIAGO - just observing for now. She feels like this may be related to her diet.     --------------------------  PATIENT INSTRUCTIONS    Patient Instructions   I'm glad to see you!    You are worth it and doing things to help even if it doesn't always feel like it.     1. Restarting topamax. We'll do a virtual visit in 6 weeks to see how this is going.   2. Referral to Weight Management Clinic - they do have a [place] location! You will need to call them to schedule. Ask how much can be done virtually.   3. Referral for counseling. [facility] will call you but can also check out [facility] in [place] ([email or web address]).     My team will call in a few weeks to see how you're doing and if we can help schedule any of your appointments.       ------------------------             BMI:   Estimated body mass index is 50.04 kg/m  as calculated from the following:    Height as of this encounter: 1.588 m (5' 2.5\").    Weight as of this encounter: 126.1 kg (278 lb).           Return in about 6 weeks (around 5/11/2022) for Follow Up, Virtual Visit - Video.    Jeff Contreras MD  [facility] John is a 41 year old who presents for the following health issues     History of Present Illness       Reason for visit:  " Follow up    She eats 0-1 servings of fruits and vegetables daily.She consumes 0 sweetened beverage(s) daily.She exercises with enough effort to increase her heart rate 9 or less minutes per day.  She exercises with enough effort to increase her heart rate 3 or less days per week.   She is taking medications regularly.     From Sept 2021  (E66.01) BMI > 40.0 (H)  Wt Readings from Last 4 Encounters:   03/30/22 126.1 kg (278 lb)   01/04/22 123.8 kg (273 lb)   09/29/21 123 kg (271 lb 3.2 oz)   06/09/21 121 kg (266 lb 12.8 oz)   - Feels ready to commit to this. I do think she would do well w/ structured program. Has worked with Lauren Bloch before. ? Gastric bypass but would want her to talk to her GI prior.  Plan: COMPREHENSIVE METABOLIC PANEL, Comprehensive         Weight Management     (I10) Benign essential hypertension  BP Readings from Last 6 Encounters:   03/30/22 118/72   01/04/22 112/76   09/29/21 112/72   07/03/21 (!) 130/92   06/14/21 132/83   06/09/21 (!) 142/90     Plan: Albumin Random Urine Quantitative with Creat         Ratio, COMPREHENSIVE METABOLIC PANEL,         lisinopril-hydrochlorothiazide (ZESTORETIC)         20-12.5 MG tablet, potassium chloride         (KLOR-CON) 20 MEQ packet     (R73.03) Prediabetes        Lab Results   Component Value Date     A1C 5.9 09/29/2021     A1C 5.4 06/25/2020      Plan: HEMOGLOBIN A1C     (K21.00) Gastroesophageal reflux disease with esophagitis, unspecified whether hemorrhage  - omeprazole 20 mg bid     (F41.9) Anxiety  Decreased to 10 mg daily and doing well.   PHQ 5/28/2020 9/25/2020 9/28/2021   PHQ-9 Total Score 2 5 4   Q9: Thoughts of better off dead/self-harm past 2 weeks Not at all Not at all Not at all      JAQUELINE-7 SCORE 5/28/2020 9/25/2020 9/28/2021   Total Score - - 2 (minimal anxiety)   Total Score 3 4 2      Plan: escitalopram (LEXAPRO) 10 MG tablet     (B00.9) HSV-2 (herpes simplex virus 2) infection  Rare outbreak w/ stress.  Plan: valACYclovir (VALTREX)  "500 MG tablet     (K90.49) Protein losing enteropathy - being worked up by [facility]  Biopsy results reviewed.   Has f/up appt with [facility] next month.   Plan: COMPREHENSIVE METABOLIC PANEL, CBC with         platelets    - life has been really stressful  - new job is super stressful, long hours, toxic communication  - wants to stay for at least a year - prior job was there for 13 years  - is interested in changing job, may slow down shortly  - now working in [facility] not doable for weight management clinic.   - last week sought out nutritional weight and wellness - not enough people to partake in 12 week program    Feeling pretty disappointed in herself.     Binging, no purging.   Feels like she is wearing her problems on the outside for everyone to see.     Wonders about counseling - feels ready.    Review of Systems   Constitutional, HEENT, cardiovascular, pulmonary, gi and gu systems are negative, except as otherwise noted.      Objective    /72 (Cuff Size: Adult Large)   Pulse 101   Temp 98  F (36.7  C) (Tympanic)   Resp 16   Ht 1.588 m (5' 2.5\")   Wt 126.1 kg (278 lb)   SpO2 98%   BMI 50.04 kg/m    Body mass index is 50.04 kg/m .  Physical Exam   GENERAL: healthy, alert and no distress  PSYCH: mentation appears normal, tearful at times, slightly flat.                "

## 2022-03-31 LAB
CHOLEST SERPL-MCNC: 144 MG/DL
FASTING STATUS PATIENT QL REPORTED: YES
HDLC SERPL-MCNC: 59 MG/DL
LDLC SERPL CALC-MCNC: 71 MG/DL
NONHDLC SERPL-MCNC: 85 MG/DL
TRIGL SERPL-MCNC: 69 MG/DL

## 2022-04-13 ENCOUNTER — TELEPHONE (OUTPATIENT)
Dept: PEDIATRICS | Facility: CLINIC | Age: 42
End: 2022-04-13

## 2022-04-13 NOTE — TELEPHONE ENCOUNTER
Called patient to follow-up from most recent visit, no answer. LVM requesting a call back directly to [facility] extension.     Sugar Sinclair, G  [phone]

## 2022-04-18 NOTE — TELEPHONE ENCOUNTER
Called patient to follow-up from most recent visit, no answer. LVM requesting a call back directly to [facility] extension.      Sugar Sinclair, G  [phone]

## 2022-04-18 NOTE — TELEPHONE ENCOUNTER
Sounds good - we have an appt next month and can see how the topamax is going - we can go up on the dose if helpful.     RACHEL Contreras MD  Internal Medicine-Pediatrics

## 2022-04-18 NOTE — TELEPHONE ENCOUNTER
Patient returned call, had spoke to [employer] weight management, no [place] location. Called [facility], got on a wait list with them. Wait list will be up to a couple months. They will help with eating disorders and anxiety. They will cover multiple conditions    Topamax is going well, no side effects and she is remember to take it. Has a cell phone alarm.    Patient will call insurance to see if she is covered for other weight management clinics.    Called [place] scheduling, [place] location closed awhile ago. Patient is aware of the 4 other locations and would rather check with her insurance to see if their is something closer to home.     Routing to PCP for update.     Sugar Sinclair, PERLA  [phone]

## 2022-04-21 NOTE — TELEPHONE ENCOUNTER
Called patient to see if she was able to find another clinic, no answer. M requesting a call back directly to [facility].     Sugar Sinclair, PERLA  [phone]

## 2022-04-27 NOTE — TELEPHONE ENCOUNTER
Called patient, has not been able to call insurance yet. Patient stated she would do that and agreed to follow-up call in a week.     She has although signed up for healthy for life meals. They provider pre-made breakfast, lunch, and dinner. Stated that patient feels better eating these. No weight loss noted, but knows it is healthier then what she was doing before. She is eating less fast food and eating out less as well.    Sugar Sinclair, [facility]  [phone]

## 2022-05-04 ENCOUNTER — MYC MEDICAL ADVICE (OUTPATIENT)
Dept: PEDIATRICS | Facility: CLINIC | Age: 42
End: 2022-05-04

## 2022-05-04 DIAGNOSIS — F41.9 ANXIETY: ICD-10-CM

## 2022-05-04 RX ORDER — ESCITALOPRAM OXALATE 10 MG/1
20 TABLET ORAL DAILY
Qty: 90 TABLET | Refills: 3 | Status: SHIPPED | OUTPATIENT
Start: 2022-05-04 | End: 2022-12-23 | Stop reason: DRUGHIGH

## 2022-05-05 NOTE — TELEPHONE ENCOUNTER
Called patient to follow-up, no answer. LVM requesting a call back directly to [facility] extension. Will discuss further during upcoming visit on May 13th.     Sugar Sinclair PERLA  [phone]

## 2022-05-12 NOTE — PROGRESS NOTES
"Dora is a 42 year old who is being evaluated via a billable video visit.      Video Start Time: 7:09 AM    Assessment & Plan       ICD-10-CM    1. Morbid obesity (H)  E66.01 phentermine (ADIPEX-P) 37.5 MG tablet   2. History of pleural effusion  Z87.09 XR Chest 2 Views     Didn't feel well on topamax - try phentermine now that BP really well controlled.    --------------------------  PATIENT INSTRUCTIONS    Patient Instructions   Great to see you!    Mental Health  - Sugar will call you and help schedule with a counselor for [facility] - just having it on the calendar is important!  - keep the lexapro at 20 mg daily    Healthy weightt  - wean off the topamax - go down to 50 mg daily for one week and then 25 mg daily for one week and then stop.   - we may add this as a second medication in the future but we've found that it alone isn't going to work for your body  - starting phentermine - anything past 12 weeks is off label  - watch your bp - call if > 135/90 consistently    Lungs  - I ordered a chest xray - call Sugar next week if things are not better and she'll help you schedule this      --------------------------             BMI:   Estimated body mass index is 50.04 kg/m  as calculated from the following:    Height as of 3/30/22: 1.588 m (5' 2.5\").    Weight as of 3/30/22: 126.1 kg (278 lb).           No follow-ups on file.    Jeff Contreras MD  [facility]   Dora is a 42 year old who presents for the following health issues     HPI     Last visit 3/30/22  You are worth it and doing things to help even if it doesn't always feel like it.      1. Restarting topamax. We'll do a virtual visit in 6 weeks to see how this is going.   2. Referral to [facility] - they do have a [place] location! You will need to call them to schedule. Ask how much can be done virtually.   3. Referral for counseling. [facility] will call you but can also check out [facility] in " [place] ([email or web address]).      My team will call in a few weeks to see how you're doing and if we can help schedule any of your appointments.     Upped lexapro to 20 mg daiy on 5/4/22.    Mental health  Appreciated the follow up calls - this was helpful.   Mentally probably would have hleped to just have something on the calendar.  On the [facility] waiting list  Yesterday made some inquiry calls    Feels like she's crying more  Emotions  Not sure if it's the topamax  Does feel more spacy and slower     Hasn't made much difference on the scale.   Adjusted eating 4/6 weeks  50% better because she did [employer] Meals - give you meals for each meal    PHENTERMINE (Adipex): approved in 1959 for appetite suppression. It has stimulant effects and cannot be used with Ritalin, Concerta, or other stimulants. It is not addictive although it's chemically related to amphetamines. Amphetamines are addictive. The most common side effects are dry mouth, increased energy and concentration, increased pulse, and constipation. You should not take phentermine if you have glaucoma, hyperthyroidism, or uncontrolled/untreated hypertension.  $24-$30 for 90 tablets    # history of pleural effusion  Wondering if the fluid may be back in her lung  A little bit of breathing different  Slight pain       Review of Systems   Constitutional, HEENT, cardiovascular, pulmonary, gi and gu systems are negative, except as otherwise noted.      Objective           Vitals:  No vitals were obtained today due to virtual visit.    Physical Exam   GENERAL: Healthy, alert and no distress  EYES: Eyes grossly normal to inspection.  No discharge or erythema, or obvious scleral/conjunctival abnormalities.  RESP: No audible wheeze, cough, or visible cyanosis.  No visible retractions or increased work of breathing.    SKIN: Visible skin clear. No significant rash, abnormal pigmentation or lesions.  NEURO: Cranial nerves grossly intact.   Mentation and speech appropriate for age.  PSYCH: Mentation appears normal, affect normal/bright, judgement and insight intact, normal speech and appearance well-groomed.        Video-Visit Details    Type of service:  Video Visit    Video End Time:7:27 AM    Originating Location (pt. Location): Home    Distant Location (provider location):  [facility]     Platform used for Video Visit: [employer]

## 2022-05-13 ENCOUNTER — VIRTUAL VISIT (OUTPATIENT)
Dept: PEDIATRICS | Facility: CLINIC | Age: 42
End: 2022-05-13
Payer: COMMERCIAL

## 2022-05-13 DIAGNOSIS — E66.01 MORBID OBESITY (H): Primary | ICD-10-CM

## 2022-05-13 DIAGNOSIS — Z87.09 HISTORY OF PLEURAL EFFUSION: ICD-10-CM

## 2022-05-13 PROCEDURE — 99214 OFFICE O/P EST MOD 30 MIN: CPT | Mod: 95 | Performed by: INTERNAL MEDICINE

## 2022-05-13 RX ORDER — PHENTERMINE HYDROCHLORIDE 37.5 MG/1
37.5 TABLET ORAL
Qty: 30 TABLET | Refills: 1 | Status: SHIPPED | OUTPATIENT
Start: 2022-05-13 | End: 2022-07-25

## 2022-05-13 NOTE — PATIENT INSTRUCTIONS
Great to see you!    Mental Health  - Sugar will call you and help schedule with a counselor for [facility] - just having it on the calendar is important!  - keep the lexapro at 20 mg daily    Healthy weightt  - wean off the topamax - go down to 50 mg daily for one week and then 25 mg daily for one week and then stop.   - we may add this as a second medication in the future but we've found that it alone isn't going to work for your body  - starting phentermine - anything past 12 weeks is off label  - watch your bp - call if > 135/90 consistently    Lungs  - I ordered a chest xray - call Sugar next week if things are not better and she'll help you schedule this

## 2022-07-25 DIAGNOSIS — E66.01 MORBID OBESITY (H): ICD-10-CM

## 2022-07-25 RX ORDER — PHENTERMINE HYDROCHLORIDE 37.5 MG/1
37.5 TABLET ORAL
Qty: 30 TABLET | Refills: 1 | Status: SHIPPED | OUTPATIENT
Start: 2022-07-25 | End: 2022-09-09

## 2022-07-25 NOTE — TELEPHONE ENCOUNTER
Can we do a VV in a month to see if she has lost weigth on the medication?    BP Readings from Last 6 Encounters:   03/30/22 118/72   01/04/22 112/76   09/29/21 112/72   07/03/21 (!) 130/92   06/14/21 132/83   06/09/21 (!) 142/90     Please also have her stop by the pharmacy for a BP check (does she have to schedule this?).    RACHEL Contreras MD  Internal Medicine-Pediatrics

## 2022-08-04 ENCOUNTER — OFFICE VISIT (OUTPATIENT)
Dept: URGENT CARE | Facility: URGENT CARE | Age: 42
End: 2022-08-04
Payer: COMMERCIAL

## 2022-08-04 ENCOUNTER — MYC MEDICAL ADVICE (OUTPATIENT)
Dept: PEDIATRICS | Facility: CLINIC | Age: 42
End: 2022-08-04

## 2022-08-04 VITALS
SYSTOLIC BLOOD PRESSURE: 130 MMHG | TEMPERATURE: 98.6 F | HEART RATE: 92 BPM | OXYGEN SATURATION: 96 % | RESPIRATION RATE: 18 BRPM | DIASTOLIC BLOOD PRESSURE: 88 MMHG | BODY MASS INDEX: 50.09 KG/M2 | WEIGHT: 278.3 LBS

## 2022-08-04 DIAGNOSIS — S29.012A STRAIN OF MID-BACK, INITIAL ENCOUNTER: Primary | ICD-10-CM

## 2022-08-04 PROCEDURE — 99213 OFFICE O/P EST LOW 20 MIN: CPT | Performed by: PHYSICIAN ASSISTANT

## 2022-08-04 ASSESSMENT — ENCOUNTER SYMPTOMS
DYSURIA: 0
FEVER: 0
COUGH: 0
DIFFICULTY URINATING: 0

## 2022-08-04 NOTE — PROGRESS NOTES
Assessment & Plan:        ICD-10-CM    1. Strain of mid-back, initial encounter  S29.012A          Plan/Clinical Decision Making:      Patient has tenderness right mid back and pain with ROM.   Normal exam otherwise.    Can use ice, stretches, ibuprofen.     Return if symptoms worsen or fail to improve in 5-7 days.     At the end of the encounter, I discussed results, diagnosis, medications. Discussed red flags for immediate return to clinic/ER, as well as indications for follow up if no improvement. Patient understood and agreed to plan. Patient was stable for discharge.        Zeinab Henderson PA-C on 8/4/2022 at 2:57 PM          Subjective:     HPI:    Dora is a 42 year old female who presents to clinic today for the following health issues:  Chief Complaint   Patient presents with     Back Pain     HPI    Patient complains of right mid back.   Pain started yesterday.   No injury or trauma.   Has some pain with movement.   No pain radiating down legs.   No numbness, no saddle paresthesias.     History obtained from the patient.    Review of Systems   Constitutional: Negative for fever.   Respiratory: Negative for cough.    Genitourinary: Negative for difficulty urinating and dysuria.       Patient Active Problem List   Diagnosis     Anxiety     BMI > 40.0 (H)     Benign essential hypertension     Elevated fasting glucose     Epidermal cyst     Gastroesophageal reflux disease with esophagitis     Family history of colon cancer - start colonoscopies at age 46     Protein losing enteropathy - being worked up by [facility]     Pleural effusion     Diffuse cystic mastopathy     Headache     Other acne     Hiatal hernia - small     Hypoproteinemia (H)        Past Medical History:   Diagnosis Date     Anxiety      Gastroesophageal reflux disease     UGI:09/09/20, Re-evaluate in 2 months.     Hypertension      Status post endoscopy 09/08/2020    Mild GERD and low protein.       Social History     Tobacco Use     Smoking  status: Never Smoker     Smokeless tobacco: Never Used   Substance Use Topics     Alcohol use: Yes     Comment: occ             Objective:     Vitals:    08/04/22 1522   BP: 130/88   BP Location: Right arm   Patient Position: Chair   Cuff Size: Adult Large   Pulse: 92   Resp: 18   Temp: 98.6  F (37  C)   TempSrc: Oral   SpO2: 96%   Weight: 126.2 kg (278 lb 4.8 oz)         Physical Exam   EXAM:   Pleasant, alert, appropriate appearance. NAD.  Head Exam: Normocephalic, atraumatic.  Eye Exam:  non icteric/injection.    Ear Exam: TMs grey without bulging. Normal canals.  Normal pinna.  Nose Exam: Normal external nose.    OroPharynx Exam:  Moist mucous membranes. No erythema, pharynx without exudate or hypertrophy.  Neck/Thyroid Exam:  No LAD.  Tenderness under chin.   Chest/Respiratory Exam: CTAB.  Cardiovascular Exam: RRR. No murmur or rubs.  ABD: soft, Non-tender, no rebound/guarding.  No masses/organomegaly.  Ext/musculoskeletal: right mid back with tenderness on palpation, pain with ROM in that area.   Neuro: CN II-XII intact grossly intact.  Skin: no rash or lesion.      Results:  No results found for any visits on 08/04/22.

## 2022-08-04 NOTE — TELEPHONE ENCOUNTER
Call placed to pt to get more information    Pt has had this feeling before  See OV note from 5/13/22    Appt made for tomorrow with SDP  Pt verbalized understanding and agrees to the plan    Thank you  Lester Teixeira RN on 8/4/2022 at 11:50 AM

## 2022-08-15 ENCOUNTER — MYC MEDICAL ADVICE (OUTPATIENT)
Dept: PEDIATRICS | Facility: CLINIC | Age: 42
End: 2022-08-15

## 2022-08-24 ENCOUNTER — ANCILLARY PROCEDURE (OUTPATIENT)
Dept: MAMMOGRAPHY | Facility: CLINIC | Age: 42
End: 2022-08-24
Attending: INTERNAL MEDICINE
Payer: COMMERCIAL

## 2022-08-24 DIAGNOSIS — Z12.31 VISIT FOR SCREENING MAMMOGRAM: ICD-10-CM

## 2022-08-24 PROCEDURE — 77067 SCR MAMMO BI INCL CAD: CPT | Mod: TC | Performed by: RADIOLOGY

## 2022-09-08 ENCOUNTER — MYC MEDICAL ADVICE (OUTPATIENT)
Dept: PEDIATRICS | Facility: CLINIC | Age: 42
End: 2022-09-08

## 2022-09-09 ENCOUNTER — VIRTUAL VISIT (OUTPATIENT)
Dept: PEDIATRICS | Facility: CLINIC | Age: 42
End: 2022-09-09
Payer: COMMERCIAL

## 2022-09-09 DIAGNOSIS — E66.01 MORBID OBESITY (H): ICD-10-CM

## 2022-09-09 DIAGNOSIS — I10 BENIGN ESSENTIAL HYPERTENSION: ICD-10-CM

## 2022-09-09 DIAGNOSIS — M72.2 PLANTAR FASCIITIS: ICD-10-CM

## 2022-09-09 DIAGNOSIS — F41.9 ANXIETY: Primary | ICD-10-CM

## 2022-09-09 DIAGNOSIS — R73.01 ELEVATED FASTING GLUCOSE: ICD-10-CM

## 2022-09-09 PROCEDURE — 99214 OFFICE O/P EST MOD 30 MIN: CPT | Mod: TEL | Performed by: INTERNAL MEDICINE

## 2022-09-09 RX ORDER — LISINOPRIL AND HYDROCHLOROTHIAZIDE 12.5; 2 MG/1; MG/1
1 TABLET ORAL DAILY
Qty: 90 TABLET | Refills: 1 | Status: SHIPPED | OUTPATIENT
Start: 2022-09-09 | End: 2023-01-16

## 2022-09-09 RX ORDER — POTASSIUM CHLORIDE 1.5 G/1.58G
20 POWDER, FOR SOLUTION ORAL DAILY
Qty: 100 EACH | Refills: 1 | Status: SHIPPED | OUTPATIENT
Start: 2022-09-09 | End: 2022-09-20

## 2022-09-09 NOTE — PROGRESS NOTES
"Dora is a 42 year old who is being evaluated via a billable telephone visit.      Assessment & Plan       ICD-10-CM    1. Anxiety  F41.9 Adult Mental Health  Referral   2. BMI > 40.0 (H)  E66.01 Med Therapy Management Referral     Comprehensive metabolic panel (BMP + Alb, Alk Phos, ALT, AST, Total. Bili, TP)   3. Benign essential hypertension  I10 Comprehensive metabolic panel (BMP + Alb, Alk Phos, ALT, AST, Total. Bili, TP)     Albumin Random Urine Quantitative with Creat Ratio     lisinopril-hydrochlorothiazide (ZESTORETIC) 20-12.5 MG tablet     potassium chloride (KLOR-CON) 20 MEQ packet   4. Elevated fasting glucose  R73.01 Hemoglobin A1c   5. Plantar fasciitis  M72.2      Overall doing much better from MH Perspective. Does want to do counseling which I am in total support of= likely with [facility].     Discussed comprehensive weight management vs MTM - would like to work with MT. Tried phentermine and didn't lose weight but did help curb appetite. Possibly consider combo phentermine/topamax for some binge eating as well.     BP stable.     --------------------------  PATIENT INSTRUCTIONS    Patient Instructions   Give [facility] a call - I put in a new referral, [facility] will call, can just take the number.     Non-fasting labs before I see you can - can be anytime.     I will connect you with [facility] ([facility] Pharmacist) for medical weight loss.    Covid booster and flu shot this fall.     Plantar fascitis - information attached, call me if not starting to get better in the next month and I can put in a Podiatry referral.     My team will call you and get you on my schedule for this winter.       --------------------------               BMI:   Estimated body mass index is 50.09 kg/m  as calculated from the following:    Height as of 3/30/22: 1.588 m (5' 2.5\").    Weight as of 8/4/22: 126.2 kg (278 lb 4.8 oz).           Return in about 4 months (around 1/9/2023).    Jeff Contreras MD  [facility]   Dora is a 42 year old, presenting for the following health issues:  No chief complaint on file.      HPI     Last VV 5/13/22  Mental Health  - Sugar will call you and help schedule with a counselor for [facility] - just having it on the calendar is important!  - keep the lexapro at 20 mg daily     Healthy weightt  - wean off the topamax - go down to 50 mg daily for one week and then 25 mg daily for one week and then stop.   - we may add this as a second medication in the future but we've found that it alone isn't going to work for your body  - starting phentermine - anything past 12 weeks is off label  - watch your bp - call if > 135/90 consistently     Lungs  - I ordered a chest xray - call Sugar next week if things are not better and she'll help you schedule this    Got a new job with the [employer], met a man and he has dogs too (now they have 3), she bought a new car. Overall feeling really happy.     Signed up to go w/ aunts and 2 cousins on a River Cruise in [place] in December - her first true international experience.     # Mental health  - changes in life have been really helpful  - does recall therapy reached out but this was right when she switched jobs  - does think for overall health it's important  - she wants to try [facility]    # Weight  - feels like this is more tackleable now   - off the topamax, felt more energy off the phentermine.  - phentermine didn't help w/ weight loss - but noticed a change in the hunger quotient.  - wants to stay working with [employer] [employer] [facility]  - boyfriend is ready to do this too - walking, healthy eating. He likes her for who she is now.  - bp stayed okay with phentermine.     # Rash  - see MyChart  - better this am    # Plantar fascitis  - pain right by her heel    Review of Systems         Objective           Vitals:  No vitals were obtained today due to virtual visit.    Physical Exam   healthy, alert and no distress  PSYCH: Alert  and oriented times 3; coherent speech, normal   rate and volume, able to articulate logical thoughts, able   to abstract reason, no tangential thoughts, no hallucinations   or delusions  Her affect is normal  RESP: No cough, no audible wheezing, able to talk in full sentences  Remainder of exam unable to be completed due to telephone visits        Phone call duration: 19 minutes

## 2022-09-09 NOTE — PATIENT INSTRUCTIONS
Give [facility] a call - I put in a new referral, [facility] will call, can just take the number.     Non-fasting labs before I see you can - can be anytime.     I will connect you with [facility] ([facility] Pharmacist) for medical weight loss.    Covid booster and flu shot this fall.     Plantar fascitis - information attached, call me if not starting to get better in the next month and I can put in a Podiatry referral.     My team will call you and get you on my schedule for this winter.

## 2022-09-15 ENCOUNTER — LAB (OUTPATIENT)
Dept: LAB | Facility: CLINIC | Age: 42
End: 2022-09-15
Payer: COMMERCIAL

## 2022-09-15 DIAGNOSIS — I10 BENIGN ESSENTIAL HYPERTENSION: ICD-10-CM

## 2022-09-15 DIAGNOSIS — E66.01 MORBID OBESITY (H): ICD-10-CM

## 2022-09-15 DIAGNOSIS — R73.01 ELEVATED FASTING GLUCOSE: ICD-10-CM

## 2022-09-15 LAB — HBA1C MFR BLD: 6.1 % (ref 0–5.6)

## 2022-09-15 PROCEDURE — 36415 COLL VENOUS BLD VENIPUNCTURE: CPT

## 2022-09-15 PROCEDURE — 80053 COMPREHEN METABOLIC PANEL: CPT

## 2022-09-15 PROCEDURE — 83036 HEMOGLOBIN GLYCOSYLATED A1C: CPT

## 2022-09-15 PROCEDURE — 82043 UR ALBUMIN QUANTITATIVE: CPT

## 2022-09-16 LAB
ALBUMIN SERPL-MCNC: 3 G/DL (ref 3.4–5)
ALP SERPL-CCNC: 52 U/L (ref 40–150)
ALT SERPL W P-5'-P-CCNC: 25 U/L (ref 0–50)
ANION GAP SERPL CALCULATED.3IONS-SCNC: 7 MMOL/L (ref 3–14)
AST SERPL W P-5'-P-CCNC: 19 U/L (ref 0–45)
BILIRUB SERPL-MCNC: 0.2 MG/DL (ref 0.2–1.3)
BUN SERPL-MCNC: 18 MG/DL (ref 7–30)
CALCIUM SERPL-MCNC: 8.6 MG/DL (ref 8.5–10.1)
CHLORIDE BLD-SCNC: 105 MMOL/L (ref 94–109)
CO2 SERPL-SCNC: 26 MMOL/L (ref 20–32)
CREAT SERPL-MCNC: 0.64 MG/DL (ref 0.52–1.04)
CREAT UR-MCNC: 96 MG/DL
GFR SERPL CREATININE-BSD FRML MDRD: >90 ML/MIN/1.73M2
GLUCOSE BLD-MCNC: 89 MG/DL (ref 70–99)
MICROALBUMIN UR-MCNC: 10 MG/L
MICROALBUMIN/CREAT UR: 10.42 MG/G CR (ref 0–25)
POTASSIUM BLD-SCNC: 3.9 MMOL/L (ref 3.4–5.3)
PROT SERPL-MCNC: 7.2 G/DL (ref 6.8–8.8)
SODIUM SERPL-SCNC: 138 MMOL/L (ref 133–144)

## 2022-09-20 ENCOUNTER — VIRTUAL VISIT (OUTPATIENT)
Dept: PHARMACY | Facility: CLINIC | Age: 42
End: 2022-09-20
Payer: COMMERCIAL

## 2022-09-20 DIAGNOSIS — R73.01 ELEVATED FASTING GLUCOSE: ICD-10-CM

## 2022-09-20 DIAGNOSIS — E66.01 MORBID OBESITY (H): Primary | ICD-10-CM

## 2022-09-20 DIAGNOSIS — I10 BENIGN ESSENTIAL HYPERTENSION: ICD-10-CM

## 2022-09-20 DIAGNOSIS — F41.9 ANXIETY: ICD-10-CM

## 2022-09-20 DIAGNOSIS — F32.A DEPRESSION, UNSPECIFIED DEPRESSION TYPE: ICD-10-CM

## 2022-09-20 DIAGNOSIS — K21.00 GASTROESOPHAGEAL REFLUX DISEASE WITH ESOPHAGITIS, UNSPECIFIED WHETHER HEMORRHAGE: ICD-10-CM

## 2022-09-20 DIAGNOSIS — E87.6 HYPOKALEMIA: ICD-10-CM

## 2022-09-20 PROCEDURE — 99605 MTMS BY PHARM NP 15 MIN: CPT | Performed by: PHARMACIST

## 2022-09-20 PROCEDURE — 99607 MTMS BY PHARM ADDL 15 MIN: CPT | Performed by: PHARMACIST

## 2022-09-20 RX ORDER — SEMAGLUTIDE 1.34 MG/ML
INJECTION, SOLUTION SUBCUTANEOUS
Qty: 1.5 ML | Refills: 0 | Status: SHIPPED | OUTPATIENT
Start: 2022-09-20 | End: 2022-10-18

## 2022-09-20 RX ORDER — FAMOTIDINE 20 MG/1
20 TABLET, FILM COATED ORAL DAILY
Qty: 90 TABLET | Refills: 0 | Status: SHIPPED | OUTPATIENT
Start: 2022-09-20 | End: 2022-12-21

## 2022-09-20 NOTE — PROGRESS NOTES
Medication Therapy Management (MTM) Encounter    ASSESSMENT:                            Medication Adherence/Access: No issues identified    Class III Obesity (BMI 40 or more): we discussed therapeutic option such as Contrave vs Qysmia, which given her tolerability in the past I would avoid Qysmia at this time. With her elevated A1c, we will trial GLP-1 RA at this time. See below GERD - she wants to trial off PPI but suggest she hold as we stabilize GLP first.    Prediabetes: GLP1 RA therapy will help with blood sugar control.    Hypertension/hypokalemia: Patient is not meeting blood pressure goal of < 130/80mmHg. Will monitor. Her potassium as been stable, recent lab and she confirmed she was off potassium for > 2 weeks prior to the check.    Anxiety and Depression: encouraged patient to out reach to [email or web address] program.    GERD: Chronic PPI use places patient at an increased risk of C. Diff, hypomagnesemia and lower bone mineral density, these risks were reviewed with the patient. Patient does want to come off PPI but will start GLP-1 RA first and then consider taper. For now we will have her start famotidine for ongoing break through symptoms.       PLAN:                            Patient to:  Continue omeprazole 20mg daily at this time. Future we will work on coming off.  Start famotidine 20mg daily for GERD.    Start Ozempic for weight loss - voucher given for trial to see if you tolerate.  0.25 mg weekly for 4 weeks, then 0.5 mg weekly.    Great idea to get set up with [email or web address]    Great idea to start meal prep again.    Follow-up: Return in about 4 weeks (around 10/18/2022) for Medication Therapy Telephone Visit.    SUBJECTIVE/OBJECTIVE:                          Dora Barrera is a 42 year old female contacted via secure video for an initial visit. She was referred to me from Dr. Contreras for weight management      Reason for visit: she had side effects from previous agents.    Allergies/ADRs:  "Reviewed in chart  Past Medical History: Reviewed in chart  Tobacco: She reports that she has never smoked. She has never used smokeless tobacco.  Alcohol: 3 times a week, thinks with covid has increased.     Medication Adherence/Access:   Patient uses pill box(es) and denies any issues.    Class III Obesity (BMI 40 or more): tapered off topiramate due to sedation. The phentermine did help with cravings but when she was off of it she felt like she was crashing and she does not like this. She works from home and wants to work on prepping more meals. Breakfast she been consistent making egg cups. Lunch is more variable buying meals. Dinner they are making more meals at home now.  Prepping meals she thinks will help with her anxiety of planning.  She has tried [employer] tracked calories but was more of a chore having to remember but it did help her which was 6-8 years ago. Has tried weight watchers for many years.  [employer] through her previous employer.   Tried phentermine and didn't lose weight but did help curb appetite.   She is also considering [facility] for Binge Eating and general mental health counseling.  Dinner meal is the most challenging for her due to larger portion. She wants to work on more quality snacks and feels 2-3 p does snack. Snacks in between meals. She does feel hungry and sometimes can he habit.   Failed medications include:   - topiramate cause too much sedation and minimal benefit  - the phentermine helped with energy but when she ran out of the medication cause her to \"crash\" and it did help with her appetite suppression. Likely per chart up her 37.5 mg dose.   Wt Readings from Last 4 Encounters:   08/04/22 278 lb 4.8 oz (126.2 kg)   03/30/22 278 lb (126.1 kg)   01/04/22 273 lb (123.8 kg)   09/29/21 271 lb 3.2 oz (123 kg)     Estimated body mass index is 50.09 kg/m  as calculated from the following:    Height as of 3/30/22: 5' 2.5\" (1.588 m).    Weight as of 8/4/22: 278 lb 4.8 oz (126.2 " kg).    Prediabetes: she reports family history of diabetes. She does not want to develop diabetes therefore wants to lose weight.  Lab Results   Component Value Date    A1C 6.1 09/15/2022    A1C 6.1 03/30/2022    A1C 6.3 01/04/2022       Anxiety and depression: New job was a huge a positive change in her life. Supportive partner and more dogs.   Her mom passed away 2 years ago so stayed 20 mg daily. In the past she would reduce the Lexapro from 20 mg to 10 mg daily. Does want to do counseling through [facility] but had a long wait list and needs to follow-up on this again.. overall she feeling good and no side effects from current regimen.  PHQ-9 SCORE 5/28/2020 9/25/2020 9/28/2021   PHQ-9 Total Score [facility] - - 4 (Minimal depression)   PHQ-9 Total Score 2 5 4     JAQUELINE-7 SCORE 5/28/2020 9/25/2020 9/28/2021   Total Score - - 2 (minimal anxiety)   Total Score 3 4 2     Hypertension/hypokalemia: Current medications include lisinopril-hydrochlorothiazide 20-12.5 mg in the morning.    Has 12-18 oz cup of coffee a day. Does not check blood pressure at home. She does have blood pressure monitor.   BP Readings from Last 3 Encounters:   08/04/22 130/88   03/30/22 118/72   01/04/22 112/76     Potassium   Date Value Ref Range Status   09/15/2022 3.9 3.4 - 5.3 mmol/L Final   06/29/2021 3.2 (L) 3.4 - 5.3 mmol/L Final       GERD: Current medications include: Prilosec (omeprazole) 20 mg once daily. She was getting heartburn and acid reflux and the omeprazole helped a lot but sometimes gets breakthrough still.   Tums or Pepto bismol.       Today's Vitals: There were no vitals taken for this visit.  ----------------      I spent 55 minutes with this patient today. All changes were made via collaborative practice agreement with Jeff Contreras MD. A copy of the visit note was provided to the patient's provider(s).    The patient was sent via [employer] a summary of these recommendations.     Oliva Maddox,  PharmD  Medication Therapy Management Pharmacist    Telemedicine Visit Details  Type of service:  Video Conference via [employer]  Start Time: 10am  End Time: 1055pm  Originating Location (patient location): Home  Distant Location (provider location):  [facility]     Medication Therapy Recommendations  Gastroesophageal reflux disease with esophagitis    Current Medication: famotidine (PEPCID) 20 MG tablet   Rationale: Synergistic therapy - Needs additional medication therapy - Indication   Recommendation: Start Medication   Status: Accepted per CPA         Morbid obesity (H)    Current Medication: semaglutide (OZEMPIC, 0.25 OR 0.5 MG/DOSE,) 2 MG/1.5ML SOPN pen   Rationale: Untreated condition - Needs additional medication therapy - Indication   Recommendation: Start Medication   Status: Accepted per CPA

## 2022-09-23 NOTE — PATIENT INSTRUCTIONS
"Recommendations from today's MTM visit:                                                      Continue omeprazole 20mg daily at this time. Future we will work on coming off.  Start famotidine 20mg daily for GERD.    Start Ozempic for weight loss - voucher given for trial to see if you tolerate.  0.25 mg weekly for 4 weeks, then 0.5 mg weekly.    Great idea to get set up with [facility]    Great idea to start meal prep again.    Follow-up: No follow-ups on file.    It was great speaking with you today.  I value your experience and would be very thankful for your time in providing feedback in our clinic survey. In the next few days, you may receive an email or text message from [email or web address] with a link to a survey related to your  clinical pharmacist.\"     To schedule another MTM appointment, please call the clinic directly or you may call the MTM scheduling line at [phone] or toll-free at [phone].     My Clinical Pharmacist's contact information:                                                      Please feel free to contact me with any questions or concerns you have.      Oliva Maddox, PharmD  Medication Therapy Management Pharmacist     "

## 2022-10-18 ENCOUNTER — VIRTUAL VISIT (OUTPATIENT)
Dept: PHARMACY | Facility: CLINIC | Age: 42
End: 2022-10-18
Payer: COMMERCIAL

## 2022-10-18 DIAGNOSIS — Z53.9 ERRONEOUS ENCOUNTER--DISREGARD: Primary | ICD-10-CM

## 2022-10-18 NOTE — PROGRESS NOTES
Medication Therapy Management (MTM) Encounter    ASSESSMENT:                            Medication Adherence/Access: {adherencechoices:696036}    Class III Obesity (BMI 40 or more): we discussed therapeutic option such as Contrave vs Qysmia, which given her tolerability in the past I would avoid Qysmia at this time. With her elevated A1c, we will trial GLP-1 RA at this time. See below GERD - she wants to trial off PPI but suggest she hold as we stabilize GLP first.    Prediabetes: GLP1 RA therapy will help with blood sugar control.    Hypertension/hypokalemia: Patient is not meeting blood pressure goal of < 130/80mmHg. Will monitor. Her potassium as been stable, recent lab and she confirmed she was off potassium for > 2 weeks prior to the check.    Anxiety and Depression: encouraged patient to out reach to [facility] program.    GERD: Chronic PPI use places patient at an increased risk of C. Diff, hypomagnesemia and lower bone mineral density, these risks were reviewed with the patient. Patient does want to come off PPI but will start GLP-1 RA first and then consider taper. For now we will have her start famotidine for ongoing break through symptoms.     PLAN:                            ***    Follow-up: {followuptest2:946577}    SUBJECTIVE/OBJECTIVE:                          Dora Barrera is a 42 year old female { :991365} for {mtmvisit:130915}     Reason for visit: ***.    Patient to:  Continue omeprazole 20mg daily at this time. Future we will work on coming off.  Start famotidine 20mg daily for GERD.    Start Ozempic for weight loss - voucher given for trial to see if you tolerate.  0.25 mg weekly for 4 weeks, then 0.5 mg weekly.    Great idea to get set up with [facility]    Great idea to start meal prep again.      Allergies/ADRs: Reviewed in chart  Past Medical History: Reviewed in chart  Tobacco: She reports that she has never smoked. She has never used smokeless tobacco.  Alcohol: 3 times a week,  "thinks with covid has increased.     Medication Adherence/Access:   Patient uses pill box(es) and denies any issues.    Class III Obesity (BMI 40 or more):       tapered off topiramate due to sedation. The phentermine did help with cravings but when she was off of it she felt like she was crashing and she does not like this. She works from home and wants to work on prepping more meals. Breakfast she been consistent making egg cups. Lunch is more variable buying meals. Dinner they are making more meals at home now.  Prepping meals she thinks will help with her anxiety of planning.  She has tried [employer] tracked calories but was more of a chore having to remember but it did help her which was 6-8 years ago. Has tried weight watchers for many years.  [place] through her previous employer.   Tried phentermine and didn't lose weight but did help curb appetite.   She is also considering [facility] for Binge Eating and general mental health counseling.  Dinner meal is the most challenging for her due to larger portion. She wants to work on more quality snacks and feels 2-3 p does snack. Snacks in between meals. She does feel hungry and sometimes can he habit.   Failed medications include:   - topiramate cause too much sedation and minimal benefit  - the phentermine helped with energy but when she ran out of the medication cause her to \"crash\" and it did help with her appetite suppression. Likely per chart up her 37.5 mg dose.   Wt Readings from Last 4 Encounters:   08/04/22 278 lb 4.8 oz (126.2 kg)   03/30/22 278 lb (126.1 kg)   01/04/22 273 lb (123.8 kg)   09/29/21 271 lb 3.2 oz (123 kg)     Estimated body mass index is 50.09 kg/m  as calculated from the following:    Height as of 3/30/22: 5' 2.5\" (1.588 m).    Weight as of 8/4/22: 278 lb 4.8 oz (126.2 kg).    Prediabetes: she reports family history of diabetes. She does not want to develop diabetes therefore wants to lose weight.  Lab Results   Component Value Date "    A1C 6.1 09/15/2022    A1C 6.1 03/30/2022    A1C 6.3 01/04/2022     Vaccines:  Most Recent Immunizations   Administered Date(s) Administered     COVID-19,PF,Pfizer (12+ Yrs) 04/30/2021     COVID-19,PF,Pfizer 12+ Yrs (2022 and After) 02/05/2022     FLU 6-35 months 10/08/2009     Flu, Unspecified 10/16/2019     [id number] Novel Flu 10/01/2009     Influenza (H1N1) 01/20/2010     Influenza (IIV3) PF 10/24/2012     Influenza Vaccine IM > 6 months Valent IIV4 (Alfuria,Fluzone) 09/29/2021     Influenza Vaccine, 6+MO IM (QUADRIVALENT W/PRESERVATIVES) 10/16/2019     Tdap (Adacel,Boostrix) 09/29/2021     Tdap (Adult) Unspecified Formulation 10/21/2010                   Anxiety and depression: New job was a huge a positive change in her life. Supportive partner and more dogs.   Her mom passed away 2 years ago so stayed 20 mg daily. In the past she would reduce the Lexapro from 20 mg to 10 mg daily. Does want to do counseling through [facility] but had a long wait list and needs to follow-up on this again.. overall she feeling good and no side effects from current regimen.  PHQ-9 SCORE 5/28/2020 9/25/2020 9/28/2021   PHQ-9 Total Score MyChart - - 4 (Minimal depression)   PHQ-9 Total Score 2 5 4     JAQUELINE-7 SCORE 5/28/2020 9/25/2020 9/28/2021   Total Score - - 2 (minimal anxiety)   Total Score 3 4 2     Hypertension/hypokalemia: Current medications include lisinopril-hydrochlorothiazide 20-12.5 mg in the morning.    Has 12-18 oz cup of coffee a day. Does not check blood pressure at home. She does have blood pressure monitor.   BP Readings from Last 3 Encounters:   08/04/22 130/88   03/30/22 118/72   01/04/22 112/76     Potassium   Date Value Ref Range Status   09/15/2022 3.9 3.4 - 5.3 mmol/L Final   06/29/2021 3.2 (L) 3.4 - 5.3 mmol/L Final       GERD: Current medications include: Prilosec (omeprazole) 20 mg once daily. She was getting heartburn and acid reflux and the omeprazole helped a lot but sometimes gets breakthrough  "still.   Tums or Pepto bismol.       Today's Vitals: There were no vitals taken for this visit.  ----------------  {SARAH?:049470}    I spent {mt total time 3:764050} with this patient today{MTMpartdbillingquestion:310424}. { :771415}. A copy of the visit note was provided to the patient's provider(s).    The patient {GIVEN/NOT GIVEN:739039::\"was given\"} a summary of these recommendations. {covisit:829876}    ***    Telemedicine Visit Details  Type of service:  {telemedvisitmtm:674225::\"Telephone visit\"}  Start Time: {video/phone visit start time:152948}  End Time: {video/phone visit end time:152948}  Originating Location (pt. Location): {video visit patient location:493131::\"Home\"}  {PROVIDER LOCATION On-site should be selected for visits conducted from your clinic location or adjoining [facility] hospital, academic office, or other nearby [facility] building. Off-site should be selected for all other provider locations, including home:091373}    Distant Location (provider location):  {virtual location provider:629732}  Provider has received verbal consent for a visit from the patient? {YES-NO  Default Yes:4444::\"Yes\"}     Medication Therapy Recommendations  No medication therapy recommendations to display     "

## 2022-12-19 DIAGNOSIS — R73.03 PREDIABETES: ICD-10-CM

## 2022-12-19 DIAGNOSIS — E66.01 MORBID OBESITY (H): ICD-10-CM

## 2022-12-19 NOTE — TELEPHONE ENCOUNTER
Auto-fax request:    semaglutide (OZEMPIC, 0.25 OR 0.5 MG/DOSE,) 2 MG/1.5ML SOPN pen (Discontinued)

## 2022-12-19 NOTE — TELEPHONE ENCOUNTER
semaglutide (OZEMPIC, 0.25 OR 0.5 MG/DOSE,) 2 MG/1.5ML SOPN pen 1.5 mL 1 10/18/2022 11/15/2022 --   Sig - Route: Inject 0.5 mg Subcutaneous once a week for 28 days - Subcutaneous   Sent to pharmacy as: Ozempic (0.25 or 0.5 MG/DOSE) 2 MG/1.5ML Subcutaneous Solution Pen-injector (semaglutide)   Class: E-Prescribe   Order: [id number]   E-Prescribing Status: Receipt confirmed by pharmacy (10/18/2022 11:53 AM CDT)     Refill request skye'd up and sent to refill pool.    Vikki SOTO RN

## 2022-12-20 DIAGNOSIS — K21.00 GASTROESOPHAGEAL REFLUX DISEASE WITH ESOPHAGITIS, UNSPECIFIED WHETHER HEMORRHAGE: ICD-10-CM

## 2022-12-20 NOTE — TELEPHONE ENCOUNTER
Routing refill request to provider for review/approval because:  Medication is active not on med list    [place] Registered Nurse  [facility]

## 2022-12-20 NOTE — TELEPHONE ENCOUNTER
Routing refill request to provider for review/approval because:  Drug not active on patient's medication list    Galilea Ring RN  PAL (Patient Advocate Liason)  [facility]

## 2022-12-21 RX ORDER — FAMOTIDINE 20 MG/1
20 TABLET, FILM COATED ORAL DAILY
Qty: 90 TABLET | Refills: 0 | Status: SHIPPED | OUTPATIENT
Start: 2022-12-21 | End: 2023-01-16

## 2022-12-22 NOTE — TELEPHONE ENCOUNTER
Called and LVM for patient. I would suggest refilling 0.5 mg dose and then follow-up on Jan with PCP could consider further titration to 1mg dose if appropriate.    Oliva Maddox, PharmD  Medication Therapy Management Pharmacist

## 2022-12-23 RX ORDER — SEMAGLUTIDE 1.34 MG/ML
1 INJECTION, SOLUTION SUBCUTANEOUS WEEKLY
Qty: 9 ML | Refills: 0 | Status: SHIPPED | OUTPATIENT
Start: 2022-12-23 | End: 2023-03-27

## 2022-12-23 RX ORDER — ESCITALOPRAM OXALATE 20 MG/1
20 TABLET ORAL DAILY
COMMUNITY
Start: 2022-10-14 | End: 2023-01-16

## 2022-12-23 RX ORDER — SEMAGLUTIDE 1.34 MG/ML
0.5 INJECTION, SOLUTION SUBCUTANEOUS WEEKLY
Qty: 1.5 ML | Refills: 1 | OUTPATIENT
Start: 2022-12-23

## 2022-12-23 NOTE — TELEPHONE ENCOUNTER
Patient is tolerating 0.5 mg dose but no weight loss. Will increase to 1mg Rx, sent per cpa. Patient has follow-up with primary care provider.    Oliva Maddox, PharmD  Medication Therapy Management Pharmacist

## 2022-12-26 ENCOUNTER — HEALTH MAINTENANCE LETTER (OUTPATIENT)
Age: 42
End: 2022-12-26

## 2023-01-16 ENCOUNTER — OFFICE VISIT (OUTPATIENT)
Dept: PEDIATRICS | Facility: CLINIC | Age: 43
End: 2023-01-16
Payer: COMMERCIAL

## 2023-01-16 VITALS
TEMPERATURE: 98.6 F | DIASTOLIC BLOOD PRESSURE: 86 MMHG | WEIGHT: 272.1 LBS | HEART RATE: 95 BPM | SYSTOLIC BLOOD PRESSURE: 122 MMHG | OXYGEN SATURATION: 95 % | HEIGHT: 63 IN | BODY MASS INDEX: 48.21 KG/M2

## 2023-01-16 DIAGNOSIS — E77.8 HYPOPROTEINEMIA (H): ICD-10-CM

## 2023-01-16 DIAGNOSIS — F41.9 ANXIETY: ICD-10-CM

## 2023-01-16 DIAGNOSIS — E66.01 MORBID OBESITY (H): ICD-10-CM

## 2023-01-16 DIAGNOSIS — K21.00 GASTROESOPHAGEAL REFLUX DISEASE WITH ESOPHAGITIS, UNSPECIFIED WHETHER HEMORRHAGE: ICD-10-CM

## 2023-01-16 DIAGNOSIS — K90.49 PROTEIN LOSING ENTEROPATHY: ICD-10-CM

## 2023-01-16 DIAGNOSIS — Z12.31 ENCOUNTER FOR SCREENING MAMMOGRAM FOR BREAST CANCER: ICD-10-CM

## 2023-01-16 DIAGNOSIS — B00.9 HSV-2 (HERPES SIMPLEX VIRUS 2) INFECTION: ICD-10-CM

## 2023-01-16 DIAGNOSIS — Z00.00 ROUTINE GENERAL MEDICAL EXAMINATION AT A HEALTH CARE FACILITY: Primary | ICD-10-CM

## 2023-01-16 DIAGNOSIS — R73.01 ELEVATED FASTING GLUCOSE: ICD-10-CM

## 2023-01-16 DIAGNOSIS — I10 BENIGN ESSENTIAL HYPERTENSION: ICD-10-CM

## 2023-01-16 PROCEDURE — 99214 OFFICE O/P EST MOD 30 MIN: CPT | Mod: 25 | Performed by: INTERNAL MEDICINE

## 2023-01-16 PROCEDURE — 0124A COVID-19 VACCINE BIVALENT BOOSTER 12+ (PFIZER): CPT | Performed by: INTERNAL MEDICINE

## 2023-01-16 PROCEDURE — 99396 PREV VISIT EST AGE 40-64: CPT | Performed by: INTERNAL MEDICINE

## 2023-01-16 PROCEDURE — 91312 COVID-19 VACCINE BIVALENT BOOSTER 12+ (PFIZER): CPT | Performed by: INTERNAL MEDICINE

## 2023-01-16 RX ORDER — LISINOPRIL AND HYDROCHLOROTHIAZIDE 12.5; 2 MG/1; MG/1
1 TABLET ORAL DAILY
Qty: 90 TABLET | Refills: 3 | Status: SHIPPED | OUTPATIENT
Start: 2023-01-16 | End: 2024-02-06

## 2023-01-16 RX ORDER — FAMOTIDINE 20 MG/1
20 TABLET, FILM COATED ORAL DAILY
Qty: 90 TABLET | Refills: 0 | Status: SHIPPED | OUTPATIENT
Start: 2023-01-16 | End: 2023-06-19

## 2023-01-16 RX ORDER — VALACYCLOVIR HYDROCHLORIDE 500 MG/1
500 TABLET, FILM COATED ORAL 2 TIMES DAILY
Qty: 40 TABLET | Refills: 0 | Status: SHIPPED | OUTPATIENT
Start: 2023-01-16 | End: 2024-05-15

## 2023-01-16 RX ORDER — ESCITALOPRAM OXALATE 20 MG/1
20 TABLET ORAL DAILY
Qty: 90 TABLET | Refills: 3 | Status: SHIPPED | OUTPATIENT
Start: 2023-01-16 | End: 2023-04-04 | Stop reason: DRUGHIGH

## 2023-01-16 SDOH — ECONOMIC STABILITY: FOOD INSECURITY: WITHIN THE PAST 12 MONTHS, THE FOOD YOU BOUGHT JUST DIDN'T LAST AND YOU DIDN'T HAVE MONEY TO GET MORE.: NEVER TRUE

## 2023-01-16 SDOH — ECONOMIC STABILITY: INCOME INSECURITY: IN THE LAST 12 MONTHS, WAS THERE A TIME WHEN YOU WERE NOT ABLE TO PAY THE MORTGAGE OR RENT ON TIME?: NO

## 2023-01-16 SDOH — ECONOMIC STABILITY: FOOD INSECURITY: WITHIN THE PAST 12 MONTHS, YOU WORRIED THAT YOUR FOOD WOULD RUN OUT BEFORE YOU GOT MONEY TO BUY MORE.: NEVER TRUE

## 2023-01-16 SDOH — HEALTH STABILITY: PHYSICAL HEALTH: ON AVERAGE, HOW MANY MINUTES DO YOU ENGAGE IN EXERCISE AT THIS LEVEL?: 0 MIN

## 2023-01-16 SDOH — ECONOMIC STABILITY: TRANSPORTATION INSECURITY
IN THE PAST 12 MONTHS, HAS LACK OF TRANSPORTATION KEPT YOU FROM MEETINGS, WORK, OR FROM GETTING THINGS NEEDED FOR DAILY LIVING?: NO

## 2023-01-16 SDOH — ECONOMIC STABILITY: TRANSPORTATION INSECURITY
IN THE PAST 12 MONTHS, HAS THE LACK OF TRANSPORTATION KEPT YOU FROM MEDICAL APPOINTMENTS OR FROM GETTING MEDICATIONS?: NO

## 2023-01-16 SDOH — HEALTH STABILITY: PHYSICAL HEALTH: ON AVERAGE, HOW MANY DAYS PER WEEK DO YOU ENGAGE IN MODERATE TO STRENUOUS EXERCISE (LIKE A BRISK WALK)?: 0 DAYS

## 2023-01-16 SDOH — ECONOMIC STABILITY: INCOME INSECURITY: HOW HARD IS IT FOR YOU TO PAY FOR THE VERY BASICS LIKE FOOD, HOUSING, MEDICAL CARE, AND HEATING?: NOT HARD AT ALL

## 2023-01-16 ASSESSMENT — SOCIAL DETERMINANTS OF HEALTH (SDOH)
ARE YOU MARRIED, WIDOWED, DIVORCED, SEPARATED, NEVER MARRIED, OR LIVING WITH A PARTNER?: LIVING WITH PARTNER
DO YOU BELONG TO ANY CLUBS OR ORGANIZATIONS SUCH AS CHURCH GROUPS UNIONS, FRATERNAL OR ATHLETIC GROUPS, OR SCHOOL GROUPS?: NO
HOW OFTEN DO YOU GET TOGETHER WITH FRIENDS OR RELATIVES?: ONCE A WEEK
HOW OFTEN DO YOU ATTEND CHURCH OR RELIGIOUS SERVICES?: 1 TO 4 TIMES PER YEAR
IN A TYPICAL WEEK, HOW MANY TIMES DO YOU TALK ON THE PHONE WITH FAMILY, FRIENDS, OR NEIGHBORS?: TWICE A WEEK

## 2023-01-16 ASSESSMENT — ENCOUNTER SYMPTOMS: BREAST MASS: 0

## 2023-01-16 ASSESSMENT — PAIN SCALES - GENERAL: PAINLEVEL: NO PAIN (0)

## 2023-01-16 ASSESSMENT — LIFESTYLE VARIABLES
HOW OFTEN DO YOU HAVE SIX OR MORE DRINKS ON ONE OCCASION: NEVER
AUDIT-C TOTAL SCORE: 2
HOW MANY STANDARD DRINKS CONTAINING ALCOHOL DO YOU HAVE ON A TYPICAL DAY: 1 OR 2
HOW OFTEN DO YOU HAVE A DRINK CONTAINING ALCOHOL: 2-4 TIMES A MONTH
SKIP TO QUESTIONS 9-10: 1

## 2023-01-16 NOTE — PROGRESS NOTES
SUBJECTIVE:   CC: Dora is an 42 year old who presents for preventive health visit.     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 dad doesn't love him  - [employer] into [employer]    # Mental health  - lexapro 20  - got bad in October but got off the wait list at [facility]  - weekly therapy  - dietician just dropped off in the last month    # BMI > 40  Wt Readings from Last 4 Encounters:   01/16/23 123.4 kg (272 lb 1.6 oz)   08/04/22 126.2 kg (278 lb 4.8 oz)   03/30/22 126.1 kg (278 lb)   01/04/22 123.8 kg (273 lb)     - working with Oliva -   - [employer]  - ozempic now at 1mg.   --- not having any GI side effects  - hasn't noticed a huge weight loss    # GERD with eophagitis  - was going to work on coming off ppi once on GLP1  - pepcid 20 daily   - omeprazoel 20 daily    # History of genital hsv  - changed from daily to prn,, not having outbreaks.  - valtrex prn    # HTN  - lisionpril-hydrochlorothiazide 20-12.5    # Hypoproteinemia   - plan is back to GI if any issues.    # HCM  - pap due 2024    Today's PHQ-2 Score:   PHQ-2 ( 1999 Pfizer) 1/16/2023   Q1: Little interest or pleasure in doing things 0   Q2: Feeling down, depressed or hopeless 1   PHQ-2 Score 1   PHQ-2 Total Score (12-17 Years)- Positive if 3 or more points; Administer PHQ-A if positive -   Q1: Little interest or pleasure in doing things Not at all   Q2: Feeling down, depressed or hopeless Several days   PHQ-2 Score 1     Social History      Tobacco Use     Smoking status: Never     Smokeless tobacco: Never   Substance Use Topics     Alcohol use: Yes     Comment: occ     If you drink alcohol do you typically have >3 drinks per day or >7 drinks per week? No    Alcohol Use 1/16/2023   Prescreen: >3 drinks/day or >7 drinks/week? No   Prescreen: >3 drinks/day or >7 drinks/week? -     Reviewed orders with patient.  Reviewed health maintenance and updated orders accordingly - Yes  Lab work is in process    Breast Cancer Screening:    FHS-7:   Breast CA Risk Assessment (FHS-7) 8/11/2021 8/24/2022   Did any of your first-degree relatives have breast or ovarian cancer? No No   Did any of your relatives have bilateral breast cancer? No No   Did any man in your family have breast cancer? No No   Did any woman in your family have breast and ovarian cancer? No No   Did any woman in your family have breast cancer before age 50 y? No No   Do you have 2 or more relatives with breast and/or ovarian cancer? No No   Do you have 2 or more relatives with breast and/or bowel cancer? No Yes       Mammogram Screening - Offered annual screening and updated Health Maintenance for mutual plan based on risk factor consideration    Pertinent mammograms are reviewed under the imaging tab.    History of abnormal Pap smear: YES - SUE 2/3 on biopsy - PAP/HPV co-testing at 12, 24 months.  If two negative results repeat co-testing in 3 years, if negative then routine screening.  PAP / HPV Latest Ref Rng & Units 9/29/2021   PAP   Negative for Intraepithelial Lesion or Malignancy (NILM)   HPV16 Negative Negative   HPV18 Negative Negative   HRHPV Negative Negative          Review of Systems   Breasts:  Negative for tenderness, breast mass and discharge.   Genitourinary: Negative for pelvic pain, vaginal bleeding and vaginal discharge.        OBJECTIVE:   /86 (BP Location: Right arm, Patient Position: Sitting, Cuff Size: Adult Large)   Pulse 95   Temp 98.6  F (37  C) (Tympanic)   " Ht 1.588 m (5' 2.5\")   Wt 123.4 kg (272 lb 1.6 oz)   LMP 12/28/2022 (Exact Date)   SpO2 95%   BMI 48.97 kg/m    Physical Exam  GENERAL: healthy, alert and no distress  EYES: Eyes grossly normal to inspection, PERRL and conjunctivae and sclerae normal  HENT: ear canals and TM's normal, nose and mouth without ulcers or lesions  NECK: no adenopathy, no asymmetry, masses, or scars and thyroid normal to palpation  RESP: lungs clear to auscultation - no rales, rhonchi or wheezes  CV: regular rate and rhythm, normal S1 S2, no S3 or S4, no murmur, click or rub, no peripheral edema and peripheral pulses strong  ABDOMEN: soft, nontender, no hepatosplenomegaly, no masses and bowel sounds normal  MS: no gross musculoskeletal defects noted, no edema  SKIN: no suspicious lesions or rashes  NEURO: Normal strength and tone, mentation intact and speech normal  PSYCH: mentation appears normal, affect normal/bright    Diagnostic Test Results:  Labs reviewed in Epic    ASSESSMENT/PLAN:   (Z00.00) Routine general medical examination at a health care facility  (primary encounter diagnosis)  Overall doing well.   - utd vaccines  - q1 mammo by choice  - pap utd  Plan: COVID-19,PF,PFIZER BOOSTER BIVALENT (12+YRS)    (E66.01) BMI > 40.0 (H)  Comment: Working with Oliva. Upped ozempic to 1mg. Hasn't seen a lot of change yet. Tolerating.     (I10) Benign essential hypertension  BP Readings from Last 6 Encounters:   01/16/23 122/86   08/04/22 130/88   03/30/22 118/72   01/04/22 112/76   09/29/21 112/72   07/03/21 (!) 130/92     Plan: lisinopril-hydrochlorothiazide (ZESTORETIC)         20-12.5 MG tablet    (R73.01) Elevated fasting glucose  Lab Results   Component Value Date    A1C 6.1 09/15/2022    A1C 6.1 03/30/2022    A1C 6.3 01/04/2022    A1C 5.9 09/29/2021    A1C 5.4 06/25/2020     Plan: Hemoglobin A1c    (F41.9) Anxiety  Overall okay. Putting in work with weekly therapy - this has been helpful.   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 Plan to f/up with GI if develops any symptoms.     (K21.00) Gastroesophageal reflux disease with esophagitis, unspecified whether hemorrhage  Taking both while titrating ozempic. If needing both for some time will recommend egd.   Plan: famotidine (PEPCID) 20 MG tablet, omeprazole         (PRILOSEC) 20 MG DR capsule    (B00.9) HSV-2 (herpes simplex virus 2) infection  Changed from daily to prn.   Plan: valACYclovir (VALTREX) 500 MG tablet    (Z12.31) Encounter for screening mammogram for breast cancer  Plan: [place] Screening Digital Bilateral      COUNSELING:  Reviewed preventive health counseling, as reflected in patient instructions      BMI:   Estimated body mass index is 48.97 kg/m  as calculated from the following:    Height as of this encounter: 1.588 m (5' 2.5\").    Weight as of this encounter: 123.4 kg (272 lb 1.6 oz).   Weight management plan: Discussed healthy diet and exercise guidelines      She reports that she has never smoked. She has never used smokeless tobacco.    Jeff Contreras MD  [facility] [place]  "

## 2023-01-16 NOTE — Clinical Note
Arrives ambulatory to the ED. States was in a psych facility three weeks ago and was prescribed lexapro trazodone and prozac. Pt has had a headache for the past week that has progressively gotten worse and today around 3pm began having auditory and visual hallucinations. Pt states the \"voices are telling me to do bad things. \"    Pt denies SI and HI. Doing well but just went up to 1mg on ozempic. Can you reach out with when you want to see her next - nothing scheduled yet. Thanks! EDUARDO

## 2023-01-16 NOTE — PROGRESS NOTES
# Mental health  - lexapro 20    # BMI > 40  - working with Oliva  [facility] [employer]  - [employer]    # GERD with eophagitis  - was going to work on coming off ppi once on GLP1  - pepcid 20 daily   - omeprazoel 20 daily    # History of cold sores  - valtrex prn    # HTN  - lisionpril-hydrochlorothiazide 20-12.5    # HCM  - pap due 2024

## 2023-01-16 NOTE — PATIENT INSTRUCTIONS
I'll have Oliva reach out with when she wants to see you.     You're putting in the work - your energy is better!    Preventive Health Recommendations  Female Ages 40 to 49    Yearly exam:   See your health care provider every year in order to  Review health changes.   Discuss preventive care.    Review your medicines if your doctor prescribed any.    Get a Pap test every three years (unless you have an abnormal result and your provider advises testing more often).    If you get Pap tests with HPV test, you only need to test every 5 years, unless you have an abnormal result. You do not need a Pap test if your uterus was removed (hysterectomy) and you have not had cancer.    You should be tested each year for STDs (sexually transmitted diseases), if you're at risk.   Ask your doctor if you should have a mammogram.    Have a colonoscopy (test for colon cancer) if someone in your family has had colon cancer or polyps before age 50.     Have a cholesterol test every 5 years.     Have a diabetes test (fasting glucose) after age 45. If you are at risk for diabetes, you should have this test every 3 years.    Shots: Get a flu shot each year. Get a tetanus shot every 10 years.     Nutrition:   Eat at least 5 servings of fruits and vegetables each day.  Eat whole-grain bread, whole-wheat pasta and brown rice instead of white grains and rice.  Get adequate Calcium and Vitamin D.      Lifestyle  Exercise at least 150 minutes a week (an average of 30 minutes a day, 5 days a week). This will help you control your weight and prevent disease.  Limit alcohol to one drink per day.  No smoking.   Wear sunscreen to prevent skin cancer.  See your dentist every six months for an exam and cleaning.

## 2023-03-24 DIAGNOSIS — R73.03 PREDIABETES: ICD-10-CM

## 2023-03-24 DIAGNOSIS — E66.01 MORBID OBESITY (H): ICD-10-CM

## 2023-03-24 NOTE — LETTER
April 3, 2023      Dora Barrera  [address]  [place] [address]        Dear Dora,       We are contacting you to notify you that you are due for a MUMTAZ Morales.     Appointments can be scheduled via [employer] or by calling [phone] for scheduling assistance.    Thank you for using [facility] for your medical concerns      Sincerely,        Jeff Contreras MD

## 2023-03-27 RX ORDER — SEMAGLUTIDE 1.34 MG/ML
1 INJECTION, SOLUTION SUBCUTANEOUS WEEKLY
Qty: 9 ML | Refills: 0 | Status: SHIPPED | OUTPATIENT
Start: 2023-03-27 | End: 2023-06-27 | Stop reason: DRUGHIGH

## 2023-03-27 NOTE — TELEPHONE ENCOUNTER
Due to see Oliva Maddox in April - can we help schedule?  A1c prior - orders in.    Lab Results   Component Value Date    A1C 6.1 09/15/2022    A1C 6.1 03/30/2022    A1C 6.3 01/04/2022    A1C 5.9 09/29/2021    A1C 5.4 06/25/2020     RACHEL Contreras MD  Internal Medicine-Pediatrics

## 2023-03-27 NOTE — TELEPHONE ENCOUNTER
Routing request to provider for review/approval because:  Labs not current:  A1c    Visit is up to date. RN will issue one time 90 day tisha refill. Please advise on labs.   Alfonzo MUÑIZ RN 3/27/2023 at 3:00 PM

## 2023-04-04 ENCOUNTER — VIRTUAL VISIT (OUTPATIENT)
Dept: PHARMACY | Facility: CLINIC | Age: 43
End: 2023-04-04
Payer: COMMERCIAL

## 2023-04-04 DIAGNOSIS — R73.03 PREDIABETES: ICD-10-CM

## 2023-04-04 DIAGNOSIS — K21.00 GASTROESOPHAGEAL REFLUX DISEASE WITH ESOPHAGITIS, UNSPECIFIED WHETHER HEMORRHAGE: ICD-10-CM

## 2023-04-04 DIAGNOSIS — E66.01 MORBID OBESITY (H): ICD-10-CM

## 2023-04-04 DIAGNOSIS — F32.A DEPRESSION, UNSPECIFIED DEPRESSION TYPE: ICD-10-CM

## 2023-04-04 DIAGNOSIS — F41.9 ANXIETY: Primary | ICD-10-CM

## 2023-04-04 PROCEDURE — 99605 MTMS BY PHARM NP 15 MIN: CPT | Mod: VID | Performed by: PHARMACIST

## 2023-04-04 PROCEDURE — 99607 MTMS BY PHARM ADDL 15 MIN: CPT | Mod: VID | Performed by: PHARMACIST

## 2023-04-04 NOTE — PROGRESS NOTES
Medication Therapy Management (MTM) Encounter    ASSESSMENT:                            Medication Adherence/Access: No issues identified    Class III Obesity (BMI 40 or more)/Prediabetes: patient may continue to optimize the Ozempic to max dose. However, I also think improving her underlying anxiety and depression will help her motivation and improvement in eating behaviors as well.     Anxiety and Depression: needs improvement, PHQ-9 and JAQUELINE-7 worse, > 5. She does benefit from change in ssri to fluoxetine.     GERD: patient may trial down on PPI but caution if worsened from Ozempic increase she resume again.    PLAN:                            Patient to:  1. In the next month, if you can  Ozempic 2 mg dose, then you can transition from 1 mg weekly to 2 mg weekly dose.  2. Ok to stop omeprazole. If you have more reflux, then resume omeprazole.  3. Decrease Lexapro to 10mg (0.5 tablet) daily for a 1 week, then stop.  4. Start right away fluoxetine/Prozac 20mg daily, after 4 weeks increase to 40mg daily.  5. Answer baseline phq-9 and jaqueline-7 sent via [employer] after our visit.     Follow-up:   Follow-up: [employer] with questions again ~ 5/4  Virtual or in clinic appointment ~6/4    SUBJECTIVE/OBJECTIVE:                          Dora Barrera is a 42 year old female contacted via secure video for a follow-up visit.  Today's visit is a follow-up MTM visit from 10/18/2022     Reason for visit: Ozempic follow-up     Allergies/ADRs: Reviewed in chart  Past Medical History: Reviewed in chart  Tobacco: She reports that she has never smoked. She has never used smokeless tobacco.  Alcohol: not currently using regularly    Medication Adherence/Access: no issues reported      Class III Obesity (BMI 40 or more)/prediabetes: Ozempic 1 mg     She reports that her heartburn has been improving with time. Her appetite did decreasing with dose increase in Ozempic. Somewhat smaller portions. However, still has challenges with  "stress eating and choosing healthy food choices. She has had more stress lately.  Prepping meals she thinks will help with her anxiety of planning.  Failed medications include:   - topiramate cause too much sedation and minimal benefit  - the phentermine helped with energy but when she ran out of the medication cause her to \"crash\" and it did help with her appetite suppression. Likely per chart up her 37.5 mg dose.   Wt Readings from Last 4 Encounters:   01/16/23 272 lb 1.6 oz (123.4 kg)   08/04/22 278 lb 4.8 oz (126.2 kg)   03/30/22 278 lb (126.1 kg)   01/04/22 273 lb (123.8 kg)     Estimated body mass index is 48.97 kg/m  as calculated from the following:    Height as of 1/16/23: 5' 2.5\" (1.588 m).    Weight as of 1/16/23: 272 lb 1.6 oz (123.4 kg).     Lab Results   Component Value Date    A1C 6.1 09/15/2022    A1C 6.1 03/30/2022    A1C 6.3 01/04/2022       Anxiety and depression:  Lexapro 20 mg daily    She is working with a therapist weekly with [facility].  Not meeting with nutritionist regularly at this time from [facility].  She has noticed more anxiety and some sadness.       9/25/2020     3:00 PM 9/28/2021     9:16 PM 4/6/2023     5:12 PM   PHQ-9 SCORE   PHQ-9 Total Score MyChart  4 (Minimal depression) 11 (Moderate depression)   PHQ-9 Total Score 5 4 11         9/25/2020     3:00 PM 9/28/2021     9:17 PM 4/6/2023     5:13 PM   JAQUELINE-7 SCORE   Total Score  2 (minimal anxiety) 11 (moderate anxiety)   Total Score 4 2 11       GERD: Current medications include: famotidine 20mg daily and Prilosec (omeprazole) 20 mg once daily. She was getting heartburn and acid reflux but has improved. She is wondering if she should continue to work her way off of omeprazole.    Today's Vitals: There were no vitals taken for this visit.  ----------------      I spent 25 minutes with this patient today. All changes were made via collaborative practice agreement with Jeff Contreras MD. A copy of the visit note was " provided to the patient's provider(s).    A summary of these recommendations was sent via [email or web address].    Oliva Maddox, PharmD  Medication Therapy Management Pharmacist      Telemedicine Visit Details  Type of service:  Video Conference via [employer]  Start Time: 1p  End Time: 1:25p     Medication Therapy Recommendations  Anxiety    Current Medication: escitalopram (LEXAPRO) 20 MG tablet (Discontinued)   Rationale: Condition refractory to medication - Ineffective medication - Effectiveness   Recommendation: Change Medication - FLUoxetine 20 MG tablet   Status: Accepted per CPA         Gastroesophageal reflux disease with esophagitis    Current Medication: omeprazole (PRILOSEC) 20 MG DR capsule   Rationale: Dose too high - Dosage too high - Safety   Recommendation: Decrease Frequency   Status: Accepted per CPA         Prediabetes    Current Medication: Semaglutide, 2 MG/DOSE, (OZEMPIC) 8 MG/3ML pen   Rationale: Dose too low - Dosage too low - Effectiveness   Recommendation: Increase Dose   Status: Accepted per CPA

## 2023-04-09 NOTE — PATIENT INSTRUCTIONS
"Recommendations from today's MTM visit:                                                      In the next month, if you can  Ozempic 2 mg dose, then you can transition from 1 mg weekly to 2 mg weekly dose.  Ok to stop omeprazole. If you have more reflux, then resume omeprazole.  Decrease Lexapro to 10mg (0.5 tablet) daily for a 1 week, then stop.  Start right away fluoxetine/Prozac 20mg daily, after 4 weeks increase to 40mg daily.  Answer baseline phq-9 and nilesh-7 sent via [email or web address] after our visit.     Follow-up:   Follow-up: [email or web address] with questions again ~ 5/4  Virtual or in clinic appointment ~6/4    It was great speaking with you today.  I value your experience and would be very thankful for your time in providing feedback in our clinic survey. In the next few days, you may receive an email or text message from [employer] with a link to a survey related to your  clinical pharmacist.\"     To schedule another MT appointment, please call the clinic directly or you may call the MTM scheduling line at [phone] or toll-free at [phone].     My Clinical Pharmacist's contact information:                                                      Please feel free to contact me with any questions or concerns you have.      Oliva Maddox, PharmD  Medication Therapy Management Pharmacist     "

## 2023-05-30 DIAGNOSIS — F41.9 ANXIETY: ICD-10-CM

## 2023-05-30 DIAGNOSIS — F32.A DEPRESSION, UNSPECIFIED DEPRESSION TYPE: ICD-10-CM

## 2023-06-01 NOTE — TELEPHONE ENCOUNTER
Routing refill request to provider for review/approval because:  Failing PHQ9    Natasha Salmeron, RN

## 2023-06-02 NOTE — TELEPHONE ENCOUNTER
Yes on fluoxetine now. Refilled and due for visit.    Sweetie Canas, PharmD, [facility]  Medication Therapy Management Provider, [facility]  Pager: [phone]

## 2023-06-17 DIAGNOSIS — F41.9 ANXIETY: ICD-10-CM

## 2023-06-17 DIAGNOSIS — K21.00 GASTROESOPHAGEAL REFLUX DISEASE WITH ESOPHAGITIS, UNSPECIFIED WHETHER HEMORRHAGE: ICD-10-CM

## 2023-06-17 DIAGNOSIS — F32.A DEPRESSION, UNSPECIFIED DEPRESSION TYPE: ICD-10-CM

## 2023-06-19 RX ORDER — FLUOXETINE 40 MG/1
40 CAPSULE ORAL DAILY
Qty: 90 CAPSULE | Refills: 1 | Status: CANCELLED | OUTPATIENT
Start: 2023-06-19

## 2023-06-19 RX ORDER — FAMOTIDINE 20 MG/1
20 TABLET, FILM COATED ORAL DAILY
Qty: 90 TABLET | Refills: 1 | Status: SHIPPED | OUTPATIENT
Start: 2023-06-19 | End: 2024-02-06

## 2023-06-19 NOTE — TELEPHONE ENCOUNTER
Omeprazole/Famotidine - Prescription approved per [facility] Refill Protocol.    Fluoxetine - no Rx protocol triggering - please advise on 90# supply request as well as any refills.        9/25/2020     3:00 PM 9/28/2021     9:16 PM 4/6/2023     5:12 PM   PHQ   PHQ-9 Total Score 5 4 11   Q9: Thoughts of better off dead/self-harm past 2 weeks Not at all Not at all Not at all

## 2023-06-19 NOTE — TELEPHONE ENCOUNTER
"Per patient to me via mychart \"Thanks! I did however stop taking it after 2 weeks of taking the 1 pill per day. 1 week with 1/2 lexapro and Prozac and 1 week only the 1 Prozac. I felt way too different and anxiety and emotions worse. I stopped and resumed 1/2 lexapro 1 week - felt way better and then back to the full lexapro.\"    Removed rx.     I recommend patient follow-up with me to update her Lexapro resumption. Patient read my mychart and has not scheduled.  Routing to Patient Advocate Liaison. Patient did not schedule any follow-up. Please confirm which medication (Lexapro) dose and how she is doing on the medication. She is due for a follow-up for mental (if not with medication therapy management then should follow-up with primary care provider/extender). I can refill rx until the next appointment so that she does not run out.    Oliva Maddox, PharmD  Medication Therapy Management Pharmacist    "

## 2023-06-20 NOTE — TELEPHONE ENCOUNTER
Attempted to reach patient, LVMTCB. Per message below, Please confirm which medication (Lexapro) dose and how she is doing on the medication. Patient is due for follow up appointment with either MTM or PCP/extender to discuss medication.     Alfonzo MUÑIZ RN 6/20/2023 at 1:26 PM

## 2023-06-26 NOTE — TELEPHONE ENCOUNTER
Routing refill request to provider for review/approval because:  Med not on pt's medlist  Lester Teixeira RN on 6/26/2023 at 1:51 PM

## 2023-06-26 NOTE — TELEPHONE ENCOUNTER
She is taking 20 mg Lexapro. Patient states that she is doing good on that dose.    Reports Prozac made her feel worse and weird.     Assisted to schedule for tomorrow with MTM.     OK Woodard on 6/26/2023 at 12:24 PM

## 2023-06-27 ENCOUNTER — OFFICE VISIT (OUTPATIENT)
Dept: PHARMACY | Facility: CLINIC | Age: 43
End: 2023-06-27
Payer: COMMERCIAL

## 2023-06-27 ENCOUNTER — LAB (OUTPATIENT)
Dept: LAB | Facility: CLINIC | Age: 43
End: 2023-06-27
Payer: COMMERCIAL

## 2023-06-27 VITALS
BODY MASS INDEX: 49.23 KG/M2 | WEIGHT: 273.5 LBS | HEART RATE: 100 BPM | OXYGEN SATURATION: 94 % | SYSTOLIC BLOOD PRESSURE: 122 MMHG | DIASTOLIC BLOOD PRESSURE: 84 MMHG

## 2023-06-27 DIAGNOSIS — E66.01 MORBID OBESITY (H): ICD-10-CM

## 2023-06-27 DIAGNOSIS — F32.A DEPRESSION, UNSPECIFIED DEPRESSION TYPE: Primary | ICD-10-CM

## 2023-06-27 DIAGNOSIS — R73.01 ELEVATED FASTING GLUCOSE: ICD-10-CM

## 2023-06-27 DIAGNOSIS — R73.03 PREDIABETES: ICD-10-CM

## 2023-06-27 LAB — HBA1C MFR BLD: 5.9 % (ref 0–5.6)

## 2023-06-27 PROCEDURE — 99606 MTMS BY PHARM EST 15 MIN: CPT | Performed by: PHARMACIST

## 2023-06-27 PROCEDURE — 36415 COLL VENOUS BLD VENIPUNCTURE: CPT

## 2023-06-27 PROCEDURE — 99607 MTMS BY PHARM ADDL 15 MIN: CPT | Performed by: PHARMACIST

## 2023-06-27 PROCEDURE — 83036 HEMOGLOBIN GLYCOSYLATED A1C: CPT

## 2023-06-27 RX ORDER — ESCITALOPRAM OXALATE 20 MG/1
20 TABLET ORAL DAILY
Qty: 90 TABLET | Refills: 1 | Status: SHIPPED | OUTPATIENT
Start: 2023-06-27 | End: 2024-02-06

## 2023-06-27 NOTE — PROGRESS NOTES
"Medication Therapy Management (MTM) Encounter    ASSESSMENT:                            Medication Adherence/Access: No issues identified    Class III Obesity (BMI 40 or more)/prediabetes: patient is interested in know how Ozempic has helped her blood sugar, will check an a1c. Reasonable to see if her insurance covers the tirzepatide, if so would change her Ozempic to tirzepatide and titrate with monitor for greater weight loss.    Anxiety and depression: stable on Lexapro.    PLAN:                            Sent rx for tirzepatide to see if covered -->  Not covered for prediabetes   patient to stay on Ozempic 2 mg weekly  Encouraged lifestyle changes, patient to consider [employer] class.   A1c check today.    Follow-up: Return in about 6 months (around 12/27/2023) for Medication Therapy Management Visit.    SUBJECTIVE/OBJECTIVE:                          Dora Barrera is a 43 year old female coming in for a follow-up visit.  Today's visit is a follow-up MTM visit from 4/4/2023.     Reason for visit: follow-up on Ozempic.    Allergies/ADRs: Reviewed in chart  Past Medical History: Reviewed in chart  Tobacco: She reports that she has never smoked. She has never used smokeless tobacco.  Alcohol: not currently using regularly    Medication Adherence/Access: no issues reported      Class III Obesity (BMI 40 or more)/prediabetes: Ozempic 2 mg weekly.     She reports no significant different in the increase of Ozempic from 1 mg to 2 mg. She worked on meal prepping and stress eating but recently got back from a roadtrip so that was different than her normal routine. She is interest in working on increasing her activity. She often does better with exercise classes then just going to the gym. Does best when she has something to follow.  Failed medications include:   - topiramate cause too much sedation and minimal benefit  - the phentermine helped with energy but when she ran out of the medication cause her to \"crash\" " "and it did help with her appetite suppression. Likely per chart up her 37.5 mg dose.   Wt Readings from Last 4 Encounters:   06/27/23 273 lb 8 oz (124.1 kg)   01/16/23 272 lb 1.6 oz (123.4 kg)   08/04/22 278 lb 4.8 oz (126.2 kg)   03/30/22 278 lb (126.1 kg)     Estimated body mass index is 49.23 kg/m  as calculated from the following:    Height as of 1/16/23: 5' 2.5\" (1.588 m).    Weight as of this encounter: 273 lb 8 oz (124.1 kg).     Lab Results   Component Value Date    A1C 6.1 09/15/2022    A1C 6.1 03/30/2022    A1C 6.3 01/04/2022       Anxiety and depression:  Lexapro 20 mg daily    She is working with a therapist weekly with [facility].Not meeting with nutritionist regularly at this time from [facility]. She feels much better on the Lexapro than Prozac. She was more emotion.     Tried: fluoxetine - worsened mood      9/25/2020     3:00 PM 9/28/2021     9:16 PM 4/6/2023     5:12 PM   PHQ-9 SCORE   PHQ-9 Total Score MyChart  4 (Minimal depression) 11 (Moderate depression)   PHQ-9 Total Score 5 4 11         9/25/2020     3:00 PM 9/28/2021     9:17 PM 4/6/2023     5:13 PM   JAQUELINE-7 SCORE   Total Score  2 (minimal anxiety) 11 (moderate anxiety)   Total Score 4 2 11     Today's Vitals: /84   Pulse 100   Wt 273 lb 8 oz (124.1 kg)   SpO2 94%   BMI 49.23 kg/m    ----------------      I spent 30 minutes with this patient today. All changes were made via collaborative practice agreement with Jeff Contreras MD. A copy of the visit note was provided to the patient's provider(s).    A summary of these recommendations was given to the patient.    Oliva Maddox, PharmD  Medication Therapy Management Pharmacist  [phone]     Medication Therapy Recommendations  Morbid obesity (H)    Current Medication: tirzepatide (MOUNJARO) 7.5 MG/0.5ML pen (Discontinued)   Rationale: More effective medication available - Ineffective medication - Effectiveness   Recommendation: Change Medication   Status: Accepted " per CPA

## 2023-06-27 NOTE — PATIENT INSTRUCTIONS
"Recommendations from today's MTM visit:                                                      A1c recheck today.    Stay on Ozempic 2 mg weekly.    Consider [employer] theory.    Rx for tirzepatide (Mounjaro) 5mg sent to pharmacy to check on coverage    Follow-up:     It was great speaking with you today.  I value your experience and would be very thankful for your time in providing feedback in our clinic survey. In the next few days, you may receive an email or text message from [employer] with a link to a survey related to your  clinical pharmacist.\"     To schedule another MTM appointment, please call the clinic directly or you may call the MTM scheduling line at [phone] or toll-free at [phone].     My Clinical Pharmacist's contact information:                                                      Please feel free to contact me with any questions or concerns you have.      Oliva Maddox, PharmD  Medication Therapy Management Pharmacist  [phone]    "

## 2023-06-28 RX ORDER — ESCITALOPRAM OXALATE 20 MG/1
20 TABLET ORAL DAILY
Qty: 90 TABLET | Refills: 3 | Status: SHIPPED | OUTPATIENT
Start: 2023-06-28 | End: 2024-05-15

## 2023-06-29 NOTE — TELEPHONE ENCOUNTER
Prescription approved per [facility] Refill Protocol.  Melissa PACK RN, BSN

## 2023-11-06 ENCOUNTER — ANCILLARY PROCEDURE (OUTPATIENT)
Dept: MAMMOGRAPHY | Facility: CLINIC | Age: 43
End: 2023-11-06
Attending: INTERNAL MEDICINE
Payer: COMMERCIAL

## 2023-11-06 DIAGNOSIS — Z12.31 VISIT FOR SCREENING MAMMOGRAM: ICD-10-CM

## 2023-11-06 PROCEDURE — 77063 BREAST TOMOSYNTHESIS BI: CPT | Mod: TC | Performed by: RADIOLOGY

## 2023-11-06 PROCEDURE — 77067 SCR MAMMO BI INCL CAD: CPT | Mod: TC | Performed by: RADIOLOGY

## 2023-11-16 DIAGNOSIS — R73.03 PREDIABETES: ICD-10-CM

## 2023-11-17 ENCOUNTER — MYC MEDICAL ADVICE (OUTPATIENT)
Dept: PEDIATRICS | Facility: CLINIC | Age: 43
End: 2023-11-17
Payer: COMMERCIAL

## 2023-11-20 NOTE — TELEPHONE ENCOUNTER
Nurse called and left a message informing patient that he is not eligible for the Paxlovid protocol due to it being over 5 days from start of symptoms (11/13/23). Nurse advised patient to call [phone] if she wanted to request other treatment options or ask questions about covid.     Ulises Menchaca RN BSN  [facility]

## 2023-12-06 ENCOUNTER — E-VISIT (OUTPATIENT)
Dept: PEDIATRICS | Facility: CLINIC | Age: 43
End: 2023-12-06
Payer: COMMERCIAL

## 2023-12-06 ENCOUNTER — MYC MEDICAL ADVICE (OUTPATIENT)
Dept: PEDIATRICS | Facility: CLINIC | Age: 43
End: 2023-12-06
Payer: COMMERCIAL

## 2023-12-06 DIAGNOSIS — G43.829 MENSTRUAL MIGRAINE WITHOUT STATUS MIGRAINOSUS, NOT INTRACTABLE: Primary | ICD-10-CM

## 2023-12-06 PROCEDURE — 99421 OL DIG E/M SVC 5-10 MIN: CPT | Performed by: INTERNAL MEDICINE

## 2023-12-08 RX ORDER — NAPROXEN 500 MG/1
TABLET ORAL
Qty: 60 TABLET | Refills: 3 | Status: SHIPPED | OUTPATIENT
Start: 2023-12-08 | End: 2024-08-20

## 2023-12-08 NOTE — PATIENT INSTRUCTIONS
Thank you for choosing us for your care. I have placed an order for a prescription so that you can start treatment. View your full visit summary for details by clicking on the link below. Your pharmacist will able to address any questions you may have about the medication.     If you're not feeling better within 5-7 days, please schedule an appointment.  You can schedule an appointment right here in [facility], or call [phone]  If the visit is for the same symptoms as your eVisit, we'll refund the cost of your eVisit if seen within seven days.

## 2024-01-04 ENCOUNTER — PATIENT OUTREACH (OUTPATIENT)
Dept: CARE COORDINATION | Facility: CLINIC | Age: 44
End: 2024-01-04
Payer: COMMERCIAL

## 2024-01-18 ENCOUNTER — PATIENT OUTREACH (OUTPATIENT)
Dept: CARE COORDINATION | Facility: CLINIC | Age: 44
End: 2024-01-18
Payer: COMMERCIAL

## 2024-02-05 DIAGNOSIS — I10 BENIGN ESSENTIAL HYPERTENSION: ICD-10-CM

## 2024-02-06 ENCOUNTER — VIRTUAL VISIT (OUTPATIENT)
Dept: PHARMACY | Facility: CLINIC | Age: 44
End: 2024-02-06

## 2024-02-06 DIAGNOSIS — R73.03 PREDIABETES: Primary | ICD-10-CM

## 2024-02-06 DIAGNOSIS — G44.209 TENSION HEADACHE: ICD-10-CM

## 2024-02-06 DIAGNOSIS — E66.01 MORBID OBESITY (H): ICD-10-CM

## 2024-02-06 PROCEDURE — 99606 MTMS BY PHARM EST 15 MIN: CPT | Mod: 95 | Performed by: PHARMACIST

## 2024-02-06 PROCEDURE — 99607 MTMS BY PHARM ADDL 15 MIN: CPT | Mod: 95 | Performed by: PHARMACIST

## 2024-02-06 RX ORDER — TIRZEPATIDE 5 MG/.5ML
5 INJECTION, SOLUTION SUBCUTANEOUS
Qty: 2 ML | Refills: 0 | Status: SHIPPED | OUTPATIENT
Start: 2024-02-06 | End: 2024-03-06

## 2024-02-06 RX ORDER — LISINOPRIL AND HYDROCHLOROTHIAZIDE 12.5; 2 MG/1; MG/1
1 TABLET ORAL DAILY
Qty: 90 TABLET | Refills: 3 | OUTPATIENT
Start: 2024-02-06

## 2024-02-06 RX ORDER — LISINOPRIL AND HYDROCHLOROTHIAZIDE 12.5; 2 MG/1; MG/1
1 TABLET ORAL DAILY
Qty: 90 TABLET | Refills: 3 | Status: SHIPPED | OUTPATIENT
Start: 2024-02-06 | End: 2024-05-15

## 2024-02-06 NOTE — PROGRESS NOTES
"Medication Therapy Management (MTM) Encounter    ASSESSMENT:                            Medication Adherence/Access: No issues identified    Class III Obesity (BMI 40 or more)/prediabetes:   Patient may benefit from change to tirzepatide for greater weight loss potential. BMI remains > 30.     Headache:  improved    PLAN:                            Change Ozempic to tirzepatide 5 mg weekly.    Follow-up: Return in about 1 month (around 3/6/2024) for from changingto tirzepatide.    SUBJECTIVE/OBJECTIVE:                          Dora Barrera is a 43 year old female contacted via secure video for a follow-up visit from 6/27/2023.       Reason for visit: weight management.    Allergies/ADRs: Reviewed in chart  Past Medical History: Reviewed in chart  Tobacco: She reports that she has never smoked. She has never used smokeless tobacco.  Alcohol: not currently using regularly    Medication Adherence/Access: no issues reported    Class III Obesity (BMI 40 or more)/prediabetes:   Ozempic 2 mg weekly.   She doesn't think the Ozempic is supressing her appetite as much and weight has maintained the same. She is interesting changing to another agent.   Failed medications include:   - topiramate cause too much sedation and minimal benefit  - the phentermine helped with energy but when she ran out of the medication cause her to \"crash\" and it did help with her appetite suppression. Likely per chart up her 37.5 mg dose.   Wt Readings from Last 4 Encounters:   06/27/23 273 lb 8 oz (124.1 kg)   01/16/23 272 lb 1.6 oz (123.4 kg)   08/04/22 278 lb 4.8 oz (126.2 kg)   03/30/22 278 lb (126.1 kg)     Estimated body mass index is 49.23 kg/m  as calculated from the following:    Height as of 1/16/23: 5' 2.5\" (1.588 m).    Weight as of 6/27/23: 273 lb 8 oz (124.1 kg).    Lab Results   Component Value Date    A1C 5.9 06/27/2023    A1C 6.1 09/15/2022    A1C 6.1 03/30/2022    A1C 6.3 01/04/2022    A1C 5.9 09/29/2021    A1C 5.4 06/25/2020 "         Headache:   Naproxen twice daily with onset of menses  She reports this has been working well without side effects.     Today's Vitals: There were no vitals taken for this visit.  ----------------      I spent 20 minutes with this patient today. All changes were made via collaborative practice agreement with Jeff Contreras MD. A copy of the visit note was provided to the patient's provider(s).    A summary of these recommendations was sent via [employer].    James MoralesD  Medication Therapy Management Pharmacist    Telemedicine Visit Details  Type of service:  Video Conference via [employer]  Start Time:  11:40 AM  End Time:  12:00PM     Medication Therapy Recommendations  Morbid obesity (H)    Current Medication: tirzepatide (MOUNJARO) 5 MG/0.5ML pen   Rationale: More effective medication available - Ineffective medication - Effectiveness   Recommendation: Change Medication   Status: Accepted per CPA

## 2024-02-11 NOTE — PATIENT INSTRUCTIONS
"Recommendation(s) from today's visit:                                                      Change Ozempic to tirzepatide 5 mg weekly.    Follow-up: Return in about 1 month (around 3/6/2024) for from changingto tirzepatide.    My Clinical Pharmacist's contact information:                                                      Oliva Maddox, PharmD  Medication therapy management clinical pharmacist    It was great speaking with you today.  I value your experience and would be very thankful for your time in providing feedback in our clinic survey. In the next few days, you may receive an email or text message from [employer] with a link to a survey related to your  clinical pharmacist.\"     To schedule another MTM appointment, please call the clinic directly [phone] or you may call the MTM scheduling line at [phone].    "

## 2024-02-24 DIAGNOSIS — R73.03 PREDIABETES: ICD-10-CM

## 2024-02-26 NOTE — TELEPHONE ENCOUNTER
I think she changed from Ozempic to tirzepatide - please confirm w/ pt and then can route back to me to complete refill process.     RACHEL Contreras MD  Internal Medicine-Pediatrics

## 2024-02-28 NOTE — TELEPHONE ENCOUNTER
Attempted to reach pt to relay provider message below. Left VM to call back and speak to a triage nurse.  James Carr, RN on 2/28/2024 at 11:55 AM

## 2024-02-28 NOTE — TELEPHONE ENCOUNTER
Patient is working with medication therapy management (MTM), Oliva, regarding switch from Ozempic to Mounjaro.    Has two pens left then will contact Oliva regarding titration up to next dose.    Please remove Ozempic from current medication list.    Kaycee Sarmiento RN

## 2024-03-06 DIAGNOSIS — R73.03 PREDIABETES: ICD-10-CM

## 2024-03-06 RX ORDER — TIRZEPATIDE 5 MG/.5ML
5 INJECTION, SOLUTION SUBCUTANEOUS
Qty: 2 ML | Refills: 0 | Status: SHIPPED | OUTPATIENT
Start: 2024-03-06 | End: 2024-04-24

## 2024-04-14 ENCOUNTER — HEALTH MAINTENANCE LETTER (OUTPATIENT)
Age: 44
End: 2024-04-14

## 2024-04-16 ENCOUNTER — TELEPHONE (OUTPATIENT)
Dept: PEDIATRICS | Facility: CLINIC | Age: 44
End: 2024-04-16
Payer: COMMERCIAL

## 2024-04-16 DIAGNOSIS — R73.03 PREDIABETES: ICD-10-CM

## 2024-04-16 RX ORDER — TIRZEPATIDE 5 MG/.5ML
5 INJECTION, SOLUTION SUBCUTANEOUS
Qty: 2 ML | Refills: 0 | Status: CANCELLED | OUTPATIENT
Start: 2024-04-16

## 2024-04-17 NOTE — TELEPHONE ENCOUNTER
Per MTM 3/8 - did she move to 7.5 weekly? Has she been taking it for at least 4 months? If so and doing well I can send in the 10 mg script.    Since Oliva isn't covered can schedule VV w/ me for medication check in mid/late May.     RACHEL Contreras MD  Internal Medicine-Pediatrics

## 2024-04-17 NOTE — TELEPHONE ENCOUNTER
RN called and spoke with patient. Patient has not been able to find 7.5mg dose at pharmacy, has been taking 5mg for about 2 months.     Does not feel like she is getting any benefit/changes from 5 mg, is not suppressing appetite. Has 1 dose left.    Today, patient will call different pharmacies to inquire about 7.5mg stock and if she cannot find any, she would like to go off of medication completely because she feels no benefit. Denies side effects.     Patient will mychart if she is able to find 7.5mg dose, or if she will be going off medication.     Patient scheduled for VV to discuss alternative options/med check 5/31/24 at 7am with Dr Diane MD.     Tiffanie JIMENEZ RN on 4/17/2024 at 10:15 AM     Routing to PCP for update.

## 2024-04-18 NOTE — TELEPHONE ENCOUNTER
The 5 mg is still a pretty low dose so I'd like to try the 7.5 at least before stopping it completely. Let me know what pharmacy she'd like it sent to.     RACHEL Contreras MD  Internal Medicine-Pediatrics

## 2024-04-19 NOTE — TELEPHONE ENCOUNTER
Closing this encounter- follow up will be routed in [facility] as that is where patient has now been communicating about the medication.     Tiffanie JIMENEZ RN on 4/19/2024 at 11:07 AM

## 2024-05-02 ENCOUNTER — TELEPHONE (OUTPATIENT)
Dept: PEDIATRICS | Facility: CLINIC | Age: 44
End: 2024-05-02

## 2024-05-02 NOTE — TELEPHONE ENCOUNTER
Prior Authorization Retail Medication Request    Medication/Dose: Semaglutide-Weight Management (WEGOVY) 0.5 MG/0.5ML pen  Diagnosis and ICD code (if different than what is on RX):  E66.01  New/renewal/insurance change PA/secondary ins. PA:  Previously Tried and Failed:  NA   Rationale:  Patient needs to treat morbid obesity.     Insurance   Primary: [facility]   Insurance ID:    [id number]     Secondary (if applicable):NA   Insurance ID:  NA    Pharmacy Information (if different than what is on RX)  Name:  [facility]   Phone:  [phone]  Fax:[phone]

## 2024-05-13 ENCOUNTER — MYC MEDICAL ADVICE (OUTPATIENT)
Dept: PEDIATRICS | Facility: CLINIC | Age: 44
End: 2024-05-13
Payer: COMMERCIAL

## 2024-05-13 ENCOUNTER — MYC REFILL (OUTPATIENT)
Dept: PHARMACY | Facility: CLINIC | Age: 44
End: 2024-05-13
Payer: COMMERCIAL

## 2024-05-13 DIAGNOSIS — E66.01 MORBID OBESITY (H): ICD-10-CM

## 2024-05-13 NOTE — TELEPHONE ENCOUNTER
Duplicate message, see myc 5/13/24 with response from [facility].    Alfonzo MUÑIZ RN 5/13/2024 at 11:25 AM

## 2024-05-15 ENCOUNTER — VIRTUAL VISIT (OUTPATIENT)
Dept: PHARMACY | Facility: CLINIC | Age: 44
End: 2024-05-15

## 2024-05-15 DIAGNOSIS — E66.01 MORBID OBESITY (H): ICD-10-CM

## 2024-05-15 DIAGNOSIS — F41.9 ANXIETY: Primary | ICD-10-CM

## 2024-05-15 DIAGNOSIS — G43.829 MENSTRUAL MIGRAINE WITHOUT STATUS MIGRAINOSUS, NOT INTRACTABLE: ICD-10-CM

## 2024-05-15 PROCEDURE — 99607 MTMS BY PHARM ADDL 15 MIN: CPT | Mod: 93 | Performed by: PHARMACIST

## 2024-05-15 PROCEDURE — 99606 MTMS BY PHARM EST 15 MIN: CPT | Mod: 93 | Performed by: PHARMACIST

## 2024-05-15 RX ORDER — PROPRANOLOL HYDROCHLORIDE 20 MG/1
20 TABLET ORAL 2 TIMES DAILY PRN
Qty: 90 TABLET | Refills: 0 | Status: SHIPPED | OUTPATIENT
Start: 2024-05-15 | End: 2024-06-27

## 2024-05-15 RX ORDER — BUPROPION HYDROCHLORIDE 150 MG/1
150 TABLET ORAL EVERY MORNING
Qty: 90 TABLET | Refills: 0 | Status: SHIPPED | OUTPATIENT
Start: 2024-05-15 | End: 2024-08-11

## 2024-05-15 RX ORDER — LISINOPRIL AND HYDROCHLOROTHIAZIDE 12.5; 2 MG/1; MG/1
1 TABLET ORAL DAILY
Qty: 90 TABLET | Refills: 3 | Status: SHIPPED | OUTPATIENT
Start: 2024-05-15 | End: 2024-09-20

## 2024-05-15 RX ORDER — ESCITALOPRAM OXALATE 20 MG/1
20 TABLET ORAL DAILY
Qty: 90 TABLET | Refills: 1 | Status: SHIPPED | OUTPATIENT
Start: 2024-05-15 | End: 2024-09-20

## 2024-05-15 NOTE — PROGRESS NOTES
Medication Therapy Management (MTM) Encounter    ASSESSMENT:                            Medication Adherence/Access: No issues identified    Mental Health  - anxiety:   Discussed options to change selective serotonin reuptake inhibitor or addition buspirone or bupropion. We opted to add on bupropion even though more commonly for depression due to her concern for sedation (needs more activating of an agent) and weight gain concern. While we wait for medication to become more effective patient can trial as needed beta-blocker (which may be help for migraine and avoid sedation risk that hydroxyzine would have for as needed agent).    Weight Management:  Recommend patient start Wegovy, reviewed with patient the difference of Wegovy vs Ozempic (brand name FDA indication). Patient is interested in restarting to avoid risk of type 2 diabetes.     Menstrual migraines:   Patient to trial beta-blocker propranolol instead of naproxen to see if more effective. Patient was concerns with long-term use of naproxen.     PLAN:                            Try propranolol 20 mg twice daily as needed for migraine/anxiety instead of the naproxen.  See which works better.    Add bupropion  mg in the morning.     Refilled other medications.    Start Wegovy 0.5 mg weekly.     Follow-up: Return in about 6 weeks (around 6/25/2024) for Medication Therapy Management Visit.    SUBJECTIVE/OBJECTIVE:                          Dora Barrera is a 44 year old female called for a follow-up visit.       Reason for visit: anxiety and weight management.    Allergies/ADRs: Reviewed in chart  Past Medical History: Reviewed in chart  Tobacco: She reports that she has never smoked. She has never used smokeless tobacco.  Alcohol: not currently using    Medication Adherence/Access: The patient fills medications at [facility]: NO, fills medications at [facility].    Mental Health  - anxiety:  Lexapro 20 mg daily  She is having more anxiety from situations  "that are coming up. She does not feel full panic attack but feels it is on a verge of becoming a panic attack. She feels her medication is not working as well as it can. She is worried about trying new medication and getting fatigue. She feels her anxiety does effect her mood.   She has worked with therapist in the past  Tried: fluoxetine - worsened mood + fatigue       Weight Management   She has been off Ozempic/Wegovy for a month now. Patient without type 2 diabetes but was on Ozempic for some time before insurance stopped covering.   She doesn't think the Ozempic is supressing her appetite as much and weight has maintained the same. She is interesting changing to another agent.   Diet: avoids juice. But admits regularly pop has been more frequent.   Failed medications include:   - topiramate cause too much sedation and minimal benefit  - the phentermine helped with energy but when she ran out of the medication cause her to \"crash\" and it did help with her appetite suppression. Likely per chart up her 37.5 mg dose.      Wt Readings from Last 4 Encounters:   06/27/23 273 lb 8 oz (124.1 kg)   01/16/23 272 lb 1.6 oz (123.4 kg)   08/04/22 278 lb 4.8 oz (126.2 kg)   03/30/22 278 lb (126.1 kg)     Estimated body mass index is 49.23 kg/m  as calculated from the following:    Height as of 1/16/23: 5' 2.5\" (1.588 m).    Weight as of 6/27/23: 273 lb 8 oz (124.1 kg).    Menstrual migraines:   Naproxen 500 mg twice daily at the onset of her menses  She will take 2 days prior, a few days in between and then 2-3 days after. She is wondering if there are other options. She doesn't want to go back on hormone therapy due to worries safety issues as she approaches menopause age.     Today's Vitals: There were no vitals taken for this visit.  ----------------    I spent 30 minutes with this patient today. All changes were made via collaborative practice agreement with Jeff Contreras MD. A copy of the visit note was " provided to the patient's provider(s).    A summary of these recommendations was sent via [employer].    Oliva Maddox, PharmD  Medication Therapy Management Pharmacist    Telemedicine Visit Details  Type of service:  Telephone visit  Start Time:  1:01p  End Time:  1:31p     Medication Therapy Recommendations  Anxiety    Current Medication: buPROPion (WELLBUTRIN XL) 150 MG 24 hr tablet   Rationale: Synergistic therapy - Needs additional medication therapy - Indication   Recommendation: Start Medication   Status: Accepted per CPA         Menstrual migraine without status migrainosus, not intractable    Current Medication: naproxen (NAPROSYN) 500 MG tablet   Rationale: Patient prefers not to take - Adherence - Adherence   Recommendation: Change Medication - propranolol 20 MG tablet   Status: Accepted per CPA         Morbid obesity (H)    Current Medication: Semaglutide-Weight Management (WEGOVY) 0.5 MG/0.5ML pen   Rationale: Does not understand instructions - Adherence - Adherence   Recommendation: Provide Adherence Intervention   Status: Patient Agreed - Adherence/Education

## 2024-05-16 NOTE — TELEPHONE ENCOUNTER
PA Initiation    Medication: WEGOVY 0.5 MG/0.5ML SC SOAJ  Insurance Company: [employer] - Phone [phone] Fax [phone]  Pharmacy Filling the Rx: [id number] - [place], [place] - [address]  AT [address]  Filling Pharmacy Phone: [phone]  Filling Pharmacy Fax:    Start Date: 5/16/2024

## 2024-05-17 NOTE — PATIENT INSTRUCTIONS
"Recommendation(s) from today's visit:                                                      Try propranolol 20 mg twice daily as needed for migraine/anxiety instead of the naproxen.  See which works better. I would also take 2 days prior to start of cycle.   Propranolol can help anxiety as well.     Add bupropion  mg in the morning.     Start Wegovy 0.5 mg weekly.     Refilled other medications.    Follow-up: Return in about 6 weeks (around 6/25/2024) for Medication Therapy Management Visit. Reach out sooner if you have side effects/concerns!    My Clinical Pharmacist's contact information:                                                      Oliva Maddox, PharmD  Medication therapy management clinical pharmacist    It was great speaking with you today.  I value your experience and would be very thankful for your time in providing feedback in our clinic survey. In the next few days, you may receive an email or text message from [employer] with a link to a survey related to your  clinical pharmacist.\"     To schedule another MTM appointment, please call the clinic directly [phone] or you may call the MTM scheduling line at [phone].    "

## 2024-05-17 NOTE — TELEPHONE ENCOUNTER
Prior Authorization Approval    Medication: WEGOVY 0.5 MG/0.5ML SC SOAJ  Authorization Effective Date: 5/17/2024  Authorization Expiration Date: 12/17/2024  Insurance Company: [employer] - Phone [phone] Fax [phone]  Which Pharmacy is filling the prescription: [id number] - [place], [place] - [address]  AT [address]  Pharmacy Notified: YES  Patient Notified: Instructed pharmacy to notify patient once order is ready.

## 2024-05-30 NOTE — PROGRESS NOTES
Dora is a 44 year old who is being evaluated via a billable video visit.          Assessment & Plan       ICD-10-CM    1. Anxiety  F41.9       2. Menstrual migraine without status migrainosus, not intractable  G43.829       3. Morbid obesity (H)  E66.01 Comprehensive metabolic panel      4. Daytime sleepiness  R40.0 Adult Sleep Eval & Management  Referral      5. Vitamin D deficiency  E55.9 Vitamin D Deficiency      6. Elevated fasting glucose  R73.01 Hemoglobin A1c      7. Screening cholesterol level  Z13.220 Lipid panel reflex to direct LDL Fasting        Overall doing well.     Tolerated wegovy 0.5 -> likely will be able to increase to 1mg next month. She hasn't had several consistent months of GLPs so I'm hopeful we'll have a better sense in Sept.     Agree w/ wellbutrin and propranolol - too soon to see a difference but no s/e. Discussed starting propranolol prior to period.     Daytime sleepiness, neck girth, some AM headaches -> sleep study.     The longitudinal plan of care for the diagnosis(es)/condition(s) as documented were addressed during this visit. Due to the added complexity in care, I will continue to support Dora in the subsequent management and with ongoing continuity of care.          Subjective   Dora is a 44 year old, presenting for the following health issues:  No chief complaint on file.    HPI     Last OV with me 1/2023; recently saw MTM    (F41.9) Anxiety  - therapy taking a break   - recently decided to add on wellbutrin to serotonin specific reuptake inhibitor, can trial prn bb (to avoid sedation w/ hydroxyzine)  - no s/e but haven't seen anything yet    (E66.01) BMI > 40.0 (H)  Wt Readings from Last 4 Encounters:   06/27/23 124.1 kg (273 lb 8 oz)   01/16/23 123.4 kg (272 lb 1.6 oz)   08/04/22 126.2 kg (278 lb 4.8 oz)   03/30/22 126.1 kg (278 lb)     - started wegovy 5/15/24 (was previously on saxenda) - first shot went well.   - hasn't been able to   - was on ozempic  previously - helped with appetite      (I10) Benign essential hypertension  BP Readings from Last 6 Encounters:   06/27/23 122/84   01/16/23 122/86   08/04/22 130/88   03/30/22 118/72   01/04/22 112/76   09/29/21 112/72     Plan: lisinopril-hydrochlorothiazide (ZESTORETIC)         20-12.5 MG tablet     (R73.01) Elevated fasting glucose  Lab Results   Component Value Date    A1C 5.9 06/27/2023    A1C 6.1 09/15/2022    A1C 6.1 03/30/2022    A1C 6.3 01/04/2022    A1C 5.9 09/29/2021    A1C 5.4 06/25/2020       # Menstrual migraines  - previously used Excedrin  - trial BB vs naproxen to see if more effective        Objective           Vitals:  No vitals were obtained today due to virtual visit.    Physical Exam   GENERAL: alert and no distress  EYES: Eyes grossly normal to inspection.  No discharge or erythema, or obvious scleral/conjunctival abnormalities.  RESP: No audible wheeze, cough, or visible cyanosis.    SKIN: Visible skin clear. No significant rash, abnormal pigmentation or lesions.  NEURO: Cranial nerves grossly intact.  Mentation and speech appropriate for age.  PSYCH: Appropriate affect, tone, and pace of words          Video-Visit Details    Type of service:  Video Visit   Originating Location (pt. Location): Home    Distant Location (provider location):  On-site  Platform used for Video Visit: Jhoan  Signed Electronically by: Jeff Contreras MD

## 2024-05-31 ENCOUNTER — VIRTUAL VISIT (OUTPATIENT)
Dept: PEDIATRICS | Facility: CLINIC | Age: 44
End: 2024-05-31
Payer: COMMERCIAL

## 2024-05-31 DIAGNOSIS — E55.9 VITAMIN D DEFICIENCY: ICD-10-CM

## 2024-05-31 DIAGNOSIS — G43.829 MENSTRUAL MIGRAINE WITHOUT STATUS MIGRAINOSUS, NOT INTRACTABLE: ICD-10-CM

## 2024-05-31 DIAGNOSIS — Z13.220 SCREENING CHOLESTEROL LEVEL: ICD-10-CM

## 2024-05-31 DIAGNOSIS — R40.0 DAYTIME SLEEPINESS: ICD-10-CM

## 2024-05-31 DIAGNOSIS — E66.01 MORBID OBESITY (H): ICD-10-CM

## 2024-05-31 DIAGNOSIS — R73.01 ELEVATED FASTING GLUCOSE: ICD-10-CM

## 2024-05-31 DIAGNOSIS — F41.9 ANXIETY: Primary | ICD-10-CM

## 2024-05-31 PROCEDURE — 99213 OFFICE O/P EST LOW 20 MIN: CPT | Mod: 95 | Performed by: INTERNAL MEDICINE

## 2024-05-31 PROCEDURE — G2211 COMPLEX E/M VISIT ADD ON: HCPCS | Mod: 95 | Performed by: INTERNAL MEDICINE

## 2024-06-27 DIAGNOSIS — G43.829 MENSTRUAL MIGRAINE WITHOUT STATUS MIGRAINOSUS, NOT INTRACTABLE: ICD-10-CM

## 2024-06-27 RX ORDER — PROPRANOLOL HYDROCHLORIDE 20 MG/1
20 TABLET ORAL 2 TIMES DAILY PRN
Qty: 180 TABLET | Refills: 1 | Status: SHIPPED | OUTPATIENT
Start: 2024-06-27 | End: 2024-09-20

## 2024-08-11 DIAGNOSIS — E66.01 MORBID OBESITY (H): ICD-10-CM

## 2024-08-11 DIAGNOSIS — F41.9 ANXIETY: ICD-10-CM

## 2024-08-12 RX ORDER — BUPROPION HYDROCHLORIDE 150 MG/1
150 TABLET ORAL EVERY MORNING
Qty: 90 TABLET | Refills: 0 | Status: SHIPPED | OUTPATIENT
Start: 2024-08-12 | End: 2024-08-20

## 2024-08-19 ASSESSMENT — SLEEP AND FATIGUE QUESTIONNAIRES
HOW LIKELY ARE YOU TO NOD OFF OR FALL ASLEEP WHILE LYING DOWN TO REST IN THE AFTERNOON WHEN CIRCUMSTANCES PERMIT: MODERATE CHANCE OF DOZING
HOW LIKELY ARE YOU TO NOD OFF OR FALL ASLEEP WHILE SITTING INACTIVE IN A PUBLIC PLACE: WOULD NEVER DOZE
HOW LIKELY ARE YOU TO NOD OFF OR FALL ASLEEP IN A CAR, WHILE STOPPED FOR A FEW MINUTES IN TRAFFIC: WOULD NEVER DOZE
HOW LIKELY ARE YOU TO NOD OFF OR FALL ASLEEP WHILE WATCHING TV: SLIGHT CHANCE OF DOZING
HOW LIKELY ARE YOU TO NOD OFF OR FALL ASLEEP WHILE SITTING AND TALKING TO SOMEONE: WOULD NEVER DOZE
HOW LIKELY ARE YOU TO NOD OFF OR FALL ASLEEP WHILE SITTING QUIETLY AFTER LUNCH WITHOUT ALCOHOL: MODERATE CHANCE OF DOZING
HOW LIKELY ARE YOU TO NOD OFF OR FALL ASLEEP WHILE SITTING AND READING: MODERATE CHANCE OF DOZING
HOW LIKELY ARE YOU TO NOD OFF OR FALL ASLEEP WHEN YOU ARE A PASSENGER IN A CAR FOR AN HOUR WITHOUT A BREAK: SLIGHT CHANCE OF DOZING

## 2024-08-19 NOTE — PROGRESS NOTES
DARIEN,Outpatient Sleep Medicine Consultation:      Name: Dora Barrera MRN# [id number]   Age: 44 year old YOB: 1980     Date of Consultation: August 20, 2024  Consultation is requested by: Jeff Contreras MD  [address] [place] [address] Jeff Contreras  Primary care provider: Jeff Contrears       Reason for Sleep Consult:     Dora Barrera is sent by Jeff Contreras for a sleep consultation regarding daytime sleepiness.    Patient s Reason for visit  Dora Barrera main reason for visit: Trouble sleeping, always exhausted, sometimes wheeze and snore  Patient states problem(s) started: On and off during afult years but more prevelant this year  Dora Barrera's goals for this visit: Ensure i do not have sleep apnea since continually exhauted and put on weight           Assessment and Plan:     Impression/Plan:    ICD-10-CM    1. Daytime sleepiness  R40.0 HST - Home Sleep Apnea Test - Noxturnal, T-3 Returnable     Adult Sleep Eval & Management  Referral      2. Benign essential hypertension  I10 HST - Home Sleep Apnea Test - Noxturnal, T-3 Returnable      3. Elevated fasting glucose  R73.01 HST - Home Sleep Apnea Test - Noxturnal, T-3 Returnable      4. Anxiety  F41.9 HST - Home Sleep Apnea Test - Noxturnal, T-3 Returnable      5. BMI > 40.0 (H)  E66.01 HST - Home Sleep Apnea Test - Noxturnal, T-3 Returnable      6. Menstrual migraine without status migrainosus, not intractable  G43.829 HST - Home Sleep Apnea Test - Noxturnal, T-3 Returnable        Plans for Dora Barrera include a home sleep study.  Dora scores 6/8 on STOP-BANG scale.  She scored positive for fatigue, apneas, blood pressure, BMI, and neck circumference.    Dora Barrera has comorbidities of hypertension (I10), elevated fasting glucose (R73.01), GERD (K21.00), anxiety (F41.9), morbid obesity (E66.01).    Recommend that Dora follow-up in the sleep  medicine clinic approximately 2 weeks after completing her sleep study so we may discuss her results and determine the trajectory of her plan of care.  Recommend that Dora also optimize her sleep schedule as well as her sleep hygiene practices to mitigate any further sleep disruption.  Recommend that Dora employ safe driving practices such as not driving a motor vehicle should she become drowsy.  Recommend weight management to BMI of 30.0 or less    60 minutes spent with patient, all of which were spent face-to-face counseling, consulting, coordinating plan of care.      MAURICE Morgan CNP         History of Present Illness:     Dora Barrera presents to the sleep medicine clinic with concerns of poor sleep quality, sense of daytime sleepiness    Dora Barrera has a medical history pertinent for hypertension, morbid obesity, anxiety, menstrual migraines, GERD, elevated fasting glucose level.    Wegovy: Just moved up to higher dose: 1.7    Menstrual migraines: Propranolol    Lexapro: Takes  in am    Prediabetic:     Weights, blood pressures, hemoglobin A1c's noted from last office visit dated 5/31/2024.  (E66.01) BMI > 40.0 (H)      Wt Readings from Last 4 Encounters:   06/27/23 124.1 kg (273 lb 8 oz)   01/16/23 123.4 kg (272 lb 1.6 oz)   08/04/22 126.2 kg (278 lb 4.8 oz)   03/30/22 126.1 kg (278 lb)       (I10) Benign essential hypertension      BP Readings from Last 6 Encounters:   06/27/23 122/84   01/16/23 122/86   08/04/22 130/88   03/30/22 118/72   01/04/22 112/76   09/29/21 112/72      Plan: lisinopril-hydrochlorothiazide (ZESTORETIC)         20-12.5 MG tablet     (R73.01) Elevated fasting glucose        Lab Results   Component Value Date     A1C 5.9 06/27/2023     A1C 6.1 09/15/2022     A1C 6.1 03/30/2022     A1C 6.3 01/04/2022     A1C 5.9 09/29/2021     A1C 5.4 06/25/2020     Dora describes moderate insomnia, both sleep initiation as well as sleep maintenance.  She notes she has 3  awakenings during the night for snort arousals, elimination needs, and pet needs.    She suffers from socially disruptive snoring, snort arousals, morning headaches, morning mouth dryness, stuffy nose upon awakening, nocturnal GERD, multiple nocturnal awakenings, frequent nocturnal leg movements, bruxism, shortness of breath with activity, joint pains at night, depression and anxiety.      Tonsils: In    Neck Circumference:2-3XL shirt    Graham Sleepiness Scale  Total score - Graham:8   (Less than 10 normal)     Insomnia Severity Scale  SEBASTIAN Total Score: 15  (normal 0-7, mild 8-14, moderate 15-21, severe 22-28)    STOP-BANG score 6/8 indicating a high pretest probability for sleep apnea.  We discussed basic pathophysiology of central sleep apnea, and that of obstructive sleep apnea.  We did also discuss implications of untreated sleep apnea.  And reviewed surgical as well as nonsurgical treatments for sleep apnea.  Dora's father has obstructive sleep apnea and uses a CPAP machine.    Social History:  Has a bed partner  Works in an office setting, accounting    Past Sleep Evaluations:  None    SLEEP-WAKE SCHEDULE:     Work/School Days: Patient goes to school/work: Yes   Usually gets into bed at 10-11  Takes patient about 30min to fall asleep  Has trouble falling asleep 3 nights per week  dog rituals, bed partner up- has health issues  Wakes up in the middle of the night 2 times.  Wakes up due to Snorting self awake;External stimuli (bed partner, pets, noise, etc);Use the bathroom  She has trouble falling back asleep 3 times a week.   It usually takes 30min to get back to sleep  Patient is usually up at 6-630a  Uses alarm: Yes    Weekends/Non-work Days/All Other Days:  Usually gets into bed at 10-11p   Takes patient about 30min to fall asleep  Patient is usually up at 9-10a  Uses alarm: No    Sleep Need  Patient gets  7-8hrs sleep on average Sometimes deep, sometimes restless  Patient thinks she needs about 9-10hrs  sleep    Dora Barrera prefers to sleep in this position(s): Side;Stomach   Patient states they do the following activities in bed: Read;Use phone, computer, or tablet    Naps  Patient takes a purposeful nap 2 times a week and naps are usually 1-3 hrs in duration  She feels better after a nap: Yes  She dozes off unintentionally 0 days per week  Patient has had a driving accident or near-miss due to sleepiness/drowsiness: No      SLEEP DISRUPTIONS:    Breathing/Snoring  Patient snores:Yes  Other people complain about her snoring: No  Patient has been told she stops breathing in her sleep:Yes  She has issues with the following: Morning headaches;Morning mouth dryness;Stuffy nose when you wake up;Heartburn or reflux at night;Getting up to urinate more than once.    Movement:  Patient gets pain, discomfort, with an urge to move:  Yes restless legs symptoms  It happens when she is resting:  Yes  It happens more at night:  Yes  Patient has been told she kicks her legs at night:  No     Behaviors in Sleep:  Dora Barrera has experienced the following behaviors while sleeping: Teeth grinding  She has experienced sudden muscle weakness during the day: No  Pt denies bruxism, sleep talking, sleep walking, and dream enactment behavior. Pt denies sleep paralysis, hypnagogue and cataplexy.       Is there anything else you would like your sleep provider to know:        CAFFEINE AND OTHER SUBSTANCES:    Patient consumes caffeinated beverages per day:  2  Last caffeine use is usually: 6p  List of any prescribed or over the counter stimulants that patient takes:    List of any prescribed or over the counter sleep medication patient takes: Meletonine  List of previous sleep medications that patient has tried:    Patient drinks alcohol to help them sleep: No  Patient drinks alcohol near bedtime: No    Family History:  Patient has a family member been diagnosed with a sleep disorder: No            SCALES:    EPWORTH SLEEPINESS  SCALE         8/19/2024     4:58 PM    [place] Sleepiness Scale ( JITENDRA Zamora  [id number]<br>ESS - [place]/English - Final version - 21 Nov 07 - [facility] [place].)   Sitting and reading Moderate chance of dozing   Watching TV Slight chance of dozing   Sitting, inactive in a public place (e.g. a theatre or a meeting) Would never doze   As a passenger in a car for an hour without a break Slight chance of dozing   Lying down to rest in the afternoon when circumstances permit Moderate chance of dozing   Sitting and talking to someone Would never doze   Sitting quietly after a lunch without alcohol Moderate chance of dozing   In a car, while stopped for a few minutes in traffic Would never doze   [place] Score (MC) 8   [place] Score (Sleep) 8         INSOMNIA SEVERITY INDEX (SEBASTIAN)          8/19/2024     4:42 PM   Insomnia Severity Index (SEBASTIAN)   Difficulty falling asleep 2   Difficulty staying asleep 2   Problems waking up too early 0   How SATISFIED/DISSATISFIED are you with your CURRENT sleep pattern? 3   How NOTICEABLE to others do you think your sleep problem is in terms of impairing the quality of your life? 2   How WORRIED/DISTRESSED are you about your current sleep problem? 3   To what extent do you consider your sleep problem to INTERFERE with your daily functioning (e.g. daytime fatigue, mood, ability to function at work/daily chores, concentration, memory, mood, etc.) CURRENTLY? 3   SEBASTIAN Total Score 15       Guidelines for Scoring/Interpretation:  Total score categories:  0-7 = No clinically significant insomnia   8-14 = Subthreshold insomnia   15-21 = Clinical insomnia (moderate severity)  22-28 = Clinical insomnia (severe)  Used via courtesy of www.[place] Semiealth.va.gov with permission from Antoine Gu PhD., [facility]      STOP BANG           8/19/2024     5:00 PM   STOP BANG Questionnaire (  2008, the American Society of Anesthesiologists, Inc. [employer]   1. Snoring - Do you  "snore loudly (louder than talking or loud enough to be heard through closed doors)? No   2. Tired - Do you often feel tired, fatigued, or sleepy during daytime? Yes   3. Observed - Has anyone observed you stop breathing during your sleep? Yes   4. Blood pressure - Do you have or are you being treated for high blood pressure? Yes   5. BMI - BMI more than 35 kg/m2? Yes   6. Age - Age over 50 yr old? No   7. Neck circumference - Neck circumference greater than 40 cm? Yes   8. Gender - Gender male? No   STOP BANG Score (MC): 6 (High risk of AUGIE)         GAD7        4/6/2023     5:13 PM   JAQUELINE-7    1. Feeling nervous, anxious, or on edge 2   2. Not being able to stop or control worrying 2   3. Worrying too much about different things 2   4. Trouble relaxing 2   5. Being so restless that it is hard to sit still 1   6. Becoming easily annoyed or irritable 1   7. Feeling afraid, as if something awful might happen 1   JAQUELINE-7 Total Score 11   If you checked any problems, how difficult have they made it for you to do your work, take care of things at home, or get along with other people? Somewhat difficult         CAGE-AID         No data to display                CAGE-AID reprinted with permission from the [place] Medical Journal, KELI Jacques. and GLENROY Gutierrez, \"Conjoint screening questionnaires for alcohol and drug abuse\" [place] Medical Journal 94: 135-140, 1995.      PATIENT HEALTH QUESTIONNAIRE-9 (PHQ - 9)        4/6/2023     5:12 PM   PHQ-9 ([employer])   1.  Little interest or pleasure in doing things 1   2.  Feeling down, depressed, or hopeless 1   3.  Trouble falling or staying asleep, or sleeping too much 2   4.  Feeling tired or having little energy 2   5.  Poor appetite or overeating 2   6.  Feeling bad about yourself - or that you are a failure or have let yourself or your family down 1   7.  Trouble concentrating on things, such as reading the newspaper or watching television 1   8.  Moving or speaking so slowly that " other people could have noticed. Or the opposite - being so fidgety or restless that you have been moving around a lot more than usual 1   9.  Thoughts that you would be better off dead, or of hurting yourself in some way 0   PHQ-9 Total Score 11   6.  Feeling bad about yourself 1   7.  Trouble concentrating 1   8.  Moving slowly or restless 1   9.  Suicidal or self-harm thoughts 0   1.  Little interest or pleasure in doing things Several days   2.  Feeling down, depressed, or hopeless Several days   3.  Trouble falling or staying asleep, or sleeping too much More than half the days   4.  Feeling tired or having little energy More than half the days   5.  Poor appetite or overeating More than half the days   6.  Feeling bad about yourself Several days   7.  Trouble concentrating Several days   8.  Moving slowly or restless Several days   9.  Suicidal or self-harm thoughts Not at all   PHQ-9 via [employer] TOTAL SCORE-----> 11 (Moderate depression)   Difficulty at work, home, or with people Somewhat difficult       Developed by Camilla Lopez, Lauren Crespo, Isac Renee and colleagues, with an educational randal from [employer]. No permission required to reproduce, translate, display or distribute.        Allergies:    Allergies   Allergen Reactions    Adhesive Tape      Other reaction(s): Rash per patient minor       Medications:    Current Outpatient Medications   Medication Sig Dispense Refill    buPROPion (WELLBUTRIN XL) 150 MG 24 hr tablet Take 1 tablet (150 mg) by mouth every morning 90 tablet 0    escitalopram (LEXAPRO) 20 MG tablet Take 1 tablet (20 mg) by mouth daily Take 20 mg by mouth daily 90 tablet 1    lisinopril-hydrochlorothiazide (ZESTORETIC) 20-12.5 MG tablet Take 1 tablet by mouth daily 90 tablet 3    naproxen (NAPROSYN) 500 MG tablet Take 500 mg twice daily with meals. Start one to two days before typical onset of menses and continue for 5-7 days. 60 tablet 3    omeprazole (PRILOSEC) 20  MG DR capsule Take 1 capsule (20 mg) by mouth daily 90 capsule 3    propranolol (INDERAL) 20 MG tablet Take 1 tablet (20 mg) by mouth 2 times daily as needed (migraine) 180 tablet 1    Semaglutide-Weight Management (WEGOVY) 1 MG/0.5ML pen Inject 1 mg Subcutaneous once a week 2 mL 0    Semaglutide-Weight Management (WEGOVY) 1.7 MG/0.75ML pen Inject 1.7 mg Subcutaneous once a week 3 mL 0       Problem List:  Patient Active Problem List    Diagnosis Date Noted    Menstrual migraine without status migrainosus, not intractable 05/31/2024     Priority: Medium    Hypoproteinemia (H24) 03/30/2022     Priority: Medium    Hiatal hernia - small 09/29/2021     Priority: Medium    Protein losing enteropathy - being worked up by [place] [facility] 04/28/2021     Priority: Medium    Pleural effusion 04/28/2021     Priority: Medium     Added automatically from request for surgery [id number]      Family history of colon cancer - start colonoscopies at age 46 09/25/2020     Priority: Medium    Gastroesophageal reflux disease with esophagitis 09/10/2020     Priority: Medium    Epidermal cyst 08/24/2020     Priority: Medium     Added automatically from request for surgery [id number]      Elevated fasting glucose 04/16/2019     Priority: Medium    Anxiety 04/12/2019     Priority: Medium    BMI > 40.0 (H) 04/12/2019     Priority: Medium    Benign essential hypertension 04/12/2019     Priority: Medium    Other acne 10/12/2009     Priority: Medium     Formatting of this note might be different from the original.  Better with ocp      Diffuse cystic mastopathy 01/14/2008     Priority: Medium     Formatting of this note might be different from the original.  BREAST US 2008 no changes since 2006          Past Medical/Surgical History:  Past Medical History:   Diagnosis Date    Anxiety     Gastroesophageal reflux disease     UGI:09/09/20, Re-evaluate in 2 months.    Hypertension     Status post endoscopy 09/08/2020    Mild GERD and low protein.     Past Surgical  History:   Procedure Laterality Date    APPENDECTOMY      '97 Incidental with another procedure.    BRONCHOSCOPY FLEXIBLE N/A 4/29/2021    Procedure: Flexible bronchoscopy, airway exam and possible biopsies;  Surgeon: Cory Ennis MD;  Location: [facility] OR    CHOLECYSTECTOMY      2009    COLONOSCOPY      EXCISE MASS TRUNK Right 9/21/2020    Procedure: EXCISION RIGHT BREAST SKIN CYST;  Surgeon: Fay Mcgraw MD;  Location: [facility] OR    GYN SURGERY      Rt. ovary removed.'94, L' Ov.cyst removed '97    HC ASPIRATION OF OVARIAN CYST Left     OOPHORECTOMY Right     Ovarian cyst, torsion    PLEUROSCOPY Right 4/29/2021    Procedure: PLEUROSCOPY and pleural biopsies;  Surgeon: Cory Ennis MD;  Location: [facility] OR       Social History:  Social History     Socioeconomic History    Marital status: Single     Spouse name: Not on file    Number of children: Not on file    Years of education: Not on file    Highest education level: Not on file   Occupational History    Not on file   Tobacco Use    Smoking status: Never    Smokeless tobacco: Never   Vaping Use    Vaping status: Never Used   Substance and Sexual Activity    Alcohol use: Yes     Comment: occ    Drug use: Never    Sexual activity: Not Currently     Partners: Male     Birth control/protection: Abstinence, Condom, Pill   Other Topics Concern    Parent/sibling w/ CABG, MI or angioplasty before 65F 55M? 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        Lives by herself, Derik (dog)        Enjoys getting outside, hiking, shopping, travel              Social Determinants of Health     Financial Resource Strain: Low Risk  (1/16/2023)    Overall Financial Resource Strain (CARDIA)     Difficulty of Paying Living Expenses: Not hard at all   Food Insecurity: No Food Insecurity (1/16/2023)    Hunger Vital Sign     Worried About Running Out of Food in the Last Year: Never true     Ran Out of Food in the Last  "Year: Never true   Transportation Needs: No Transportation Needs (1/16/2023)    PRAPARE - Transportation     Lack of Transportation (Medical): No     Lack of Transportation (Non-Medical): No   Physical Activity: Inactive (1/16/2023)    Exercise Vital Sign     Days of Exercise per Week: 0 days     Minutes of Exercise per Session: 0 min   Stress: No Stress Concern Present (1/16/2023)    [place] [employer] - Occupational Stress Questionnaire     Feeling of Stress : Only a little   Social Connections: Moderately Integrated (1/16/2023)    Social Connection and Isolation Panel [NHANES]     Frequency of Communication with Friends and Family: Twice a week     Frequency of Social Gatherings with Friends and Family: Once a week     Attends Sabianist Services: 1 to 4 times per year     Active Member of Clubs or Organizations: No     Attends Club or Organization Meetings: Not on file     Marital Status: Living with partner   Interpersonal Safety: Not on file   Housing Stability: Unknown (1/16/2023)    Housing Stability Vital Sign     Unable to Pay for Housing in the Last Year: No     Number of Places Lived in the Last Year: Not on file     Unstable Housing in the Last Year: No       Family History:  Family History   Problem Relation Age of Onset    Hypertension Mother     Kidney Cancer Mother 72        Kidney    Heart Disease Father     Diabetes Father     Hypertension Father     Other - See Comments Maternal Grandmother         Breast lump    Colon Cancer Maternal Grandfather         in his 70s    Breast Cancer No family hx of     Ovarian Cancer No family hx of        Review of Systems:  A complete review of systems reviewed by me is negative with the exeption of what has been mentioned in the history of present illness.        Physical Examination:  Vitals: Ht 1.575 m (5' 2\")   Wt 127 kg (280 lb)   BMI 51.21 kg/m    BMI= Body mass index is 51.21 kg/m .       Physical Exam   Constitutional: She appears " "healthy. No distress.   HENT:   Nose: No nasal discharge.   Mouth/Throat: Oropharynx is clear.   Eyes: Conjunctivae are normal.   Pulmonary/Chest: Effort normal.   Musculoskeletal:      Cervical back: Normal range of motion and neck supple.   Neurological: She is alert and oriented to person, place, and time.        Physical examination is limited by the nature of a virtual visit      All Labs Personally Reviewed               Data: All pertinent previous laboratory data reviewed     Recent Labs   Lab Test 09/15/22  1151 01/04/22  1415    140   POTASSIUM 3.9 3.7   CHLORIDE 105 105   CO2 26 28   ANIONGAP 7 7   GLC 89 107*   BUN 18 20   CR 0.64 0.68   SORAYA 8.6 8.8       Recent Labs   Lab Test 01/04/22  1415   WBC 14.4*   RBC 4.63   HGB 12.8   HCT 40.6   MCV 88   MCH 27.6   MCHC 31.5   RDW 14.9   *       Recent Labs   Lab Test 09/15/22  1151   PROTTOTAL 7.2   ALBUMIN 3.0*   BILITOTAL 0.2   ALKPHOS 52   AST 19   ALT 25       TSH (mU/L)   Date Value   01/04/2022 1.77       No results found for: \"UAMP\", \"UBARB\", \"BENZODIAZEUR\", \"UCANN\", \"UCOC\", \"OPIT\", \"UPCP\"    No results found for: \"IRONSAT\", \"VZ09362\", \"CHAS\"    No results found for: \"PH\", \"PHARTERIAL\", \"PO2\", \"BZ4SVECFIXM\", \"SAT\", \"PCO2\", \"HCO3\", \"BASEEXCESS\", \"WAGNER\", \"BEB\"    @LABRCNTIPR(phv:4,pco2v:4,po2v:4,hco3v:4,johnna:4,o2per:4)@    Echocardiology: No results found for this or any previous visit (from the past 4320 hour(s)).        Chest x-ray: No results found for this or any previous visit from the past 365 days.      Chest CT: No results found for this or any previous visit from the past 365 days.      PFT: Most Recent B[facility]ze Pulmonary Function Testing        Bibi Castro, MAURICE CNP 8/19/2024   Sleep Medicine            "

## 2024-08-20 ENCOUNTER — VIRTUAL VISIT (OUTPATIENT)
Dept: PHARMACY | Facility: CLINIC | Age: 44
End: 2024-08-20
Payer: COMMERCIAL

## 2024-08-20 ENCOUNTER — VIRTUAL VISIT (OUTPATIENT)
Dept: SLEEP MEDICINE | Facility: CLINIC | Age: 44
End: 2024-08-20
Attending: INTERNAL MEDICINE
Payer: COMMERCIAL

## 2024-08-20 VITALS — BODY MASS INDEX: 51.53 KG/M2 | WEIGHT: 280 LBS | HEIGHT: 62 IN

## 2024-08-20 DIAGNOSIS — K21.00 GASTROESOPHAGEAL REFLUX DISEASE WITH ESOPHAGITIS, UNSPECIFIED WHETHER HEMORRHAGE: ICD-10-CM

## 2024-08-20 DIAGNOSIS — E66.01 MORBID OBESITY (H): Primary | ICD-10-CM

## 2024-08-20 DIAGNOSIS — G43.829 MENSTRUAL MIGRAINE WITHOUT STATUS MIGRAINOSUS, NOT INTRACTABLE: ICD-10-CM

## 2024-08-20 DIAGNOSIS — R40.0 DAYTIME SLEEPINESS: Primary | ICD-10-CM

## 2024-08-20 DIAGNOSIS — E66.01 MORBID OBESITY (H): ICD-10-CM

## 2024-08-20 DIAGNOSIS — F41.9 ANXIETY: ICD-10-CM

## 2024-08-20 DIAGNOSIS — R73.01 ELEVATED FASTING GLUCOSE: ICD-10-CM

## 2024-08-20 DIAGNOSIS — I10 BENIGN ESSENTIAL HYPERTENSION: ICD-10-CM

## 2024-08-20 PROCEDURE — 99606 MTMS BY PHARM EST 15 MIN: CPT | Mod: 95 | Performed by: PHARMACIST

## 2024-08-20 PROCEDURE — 99607 MTMS BY PHARM ADDL 15 MIN: CPT | Mod: 95 | Performed by: PHARMACIST

## 2024-08-20 PROCEDURE — 99205 OFFICE O/P NEW HI 60 MIN: CPT | Mod: 95 | Performed by: NURSE PRACTITIONER

## 2024-08-20 RX ORDER — BUPROPION HYDROCHLORIDE 300 MG/1
300 TABLET ORAL EVERY MORNING
Qty: 90 TABLET | Refills: 0 | Status: SHIPPED | OUTPATIENT
Start: 2024-08-20 | End: 2024-09-20

## 2024-08-20 ASSESSMENT — PAIN SCALES - GENERAL: PAINLEVEL: NO PAIN (0)

## 2024-08-20 NOTE — NURSING NOTE
Current patient location:  [address] [place] [address]    Is the patient currently in the state of [place]? YES    Visit mode:VIDEO    If the visit is dropped, the patient can be reconnected by: VIDEO VISIT: Text to cell phone:   Telephone Information:   Mobile [phone]       Will anyone else be joining the visit? NO  (If patient encounters technical issues they should call [phone]    How would you like to obtain your AVS? MyChart    Are changes needed to the allergy or medication list? No    Are refills needed on medications prescribed by this physician? NO    Rooming Documentation:  Questionnaire(s) completed      Reason for visit: Consult    Cely SALAZAR

## 2024-08-20 NOTE — PROGRESS NOTES
Added Note: Provider Note Read Only  Virtual Visit Details    Type of service:  Video Visit 7:30 AM-8:10 AM    Originating Location (pt. Location): Home    Distant Location (provider location):  Off-site  Platform used for Video Visit: [facility]

## 2024-08-20 NOTE — PATIENT INSTRUCTIONS
"Recommendation(s) from today's visit:                                                      Day of and day after Wegovy, may increase omeprazole to 40 mg.     After completing 4 weeks on Wegovy 1.7 mg, then increase to 2.4 mg weekly.    Increase bupropion  mg in the morning     Great idea to start tracking weekly weight and food diary. Record and we can discuss at follow-up.    Try to set a goal to go on a walk after dinner with your partner to increase activity.     Follow-up: Return in about 6 weeks (around 10/1/2024) for Medication Therapy Telephone Visit.    My Clinical Pharmacist's contact information:                                                      Oliva Maddox, PharmD  Medication therapy management clinical pharmacist    It was great speaking with you today.  I value your experience and would be very thankful for your time in providing feedback in our clinic survey. In the next few days, you may receive an email or text message from [employer] with a link to a survey related to your  clinical pharmacist.\"     To schedule another MTM appointment, please call the clinic directly [phone] or you may call the MTM scheduling line at [phone].    "

## 2024-08-20 NOTE — PATIENT INSTRUCTIONS
"          MY TREATMENT INFORMATION FOR SLEEP APNEA-  Dora Barrera    DOCTOR : MAURICE Morgan CNP        Am I having a home sleep study?  --->Watch the video for the device you are using:    -/drop off device-   https://www.BlockAvenue.com/watch?v=yGGFBdELGhk          Frequently asked questions:  1. What is Obstructive Sleep Apnea (AUGIE)? AUGIE is the most common type of sleep apnea. Apnea means, \"without breath.\"  Apnea is most often caused by narrowing or collapse of the upper airway as muscles relax during sleep.   Almost everyone has occasional apneas. Most people with sleep apnea have had brief interruptions at night frequently for many years.  The severity of sleep apnea is related to how frequent and severe the events are.   2. What are the consequences of AUGIE? Symptoms include: feeling sleepy during the day, snoring loudly, gasping or stopping of breathing, trouble sleeping, and occasionally morning headaches or heartburn at night.  Sleepiness can be serious and even increase the risk of falling asleep while driving. Other health consequences may include development of high blood pressure and other cardiovascular disease in persons who are susceptible. Untreated AUGIE  can contribute to heart disease, stroke and diabetes.   3. What are the treatment options? In most situations, sleep apnea is a lifelong disease that must be managed with daily therapy. Medications are not effective for sleep apnea and surgery is generally not considered until other therapies have been tried. Your treatment is your choice . Continuous Positive Airway (CPAP) works right away and is the therapy that is effective in nearly everyone. An oral device to hold your jaw forward is usually the next most reliable option. Other options include postioning devices (to keep you off your back), weight loss, and surgery including a tongue pacing device. There is more detail about some of these options below.  4. Are my sleep studies " covered by insurance? Although we will request verification of coverage, we advise you also check in advance of the study to ensure there is coverage.    Important tips for those choosing CPAP and similar devices  REMEMBER-IF YOU RECEIVE A CALL FROM  [phone]-->IT IS TO SETUP A DEVICE  For new devices, sign up for device MATEO to monitor your device for your followup visits  We encourage you to utilize the Variable mateo or website ( https://everbill.[employer]/ ) to monitor your therapy progress and share the data with your healthcare team when you discuss your sleep apnea.                                                    Know your equipment:  CPAP is continuous positive airway pressure that prevents obstructive sleep apnea by keeping the throat from collapsing while you are sleeping. In most cases, the device is  smart  and can slowly self-adjusts if your throat collapses and keeps a record every day of how well you are treated-this information is available to you and your care team.  BPAP is bilevel positive airway pressure that keeps your throat open and also assists each breath with a pressure boost to maintain adequate breathing.  Special kinds of BPAP are used in patients who have inadequate breathing from lung or heart disease. In most cases, the device is  smart  and can slowly self-adjusts to assist breathing. Like CPAP, the device keeps a record of how well you are treated.  Your mask is your connection to the device. You get to choose what feels most comfortable and the staff will help to make sure if fits. Here: are some examples of the different masks that are available: Magnetic mask aids may assist with use but there are safety issues that should be addressed when considering with magnets* ( see end of discussion).       Key points to remember on your journey with sleep apnea:  Sleep study.  PAP devices often need to be adjusted during a sleep study to show that they are effective and adjusted  right.  Good tips to remember: Try wearing just the mask during a quiet time during the day so your body adapts to wearing it. A humidifier is recommended for comfort in most cases to prevent drying of your nose and throat. Allergy medication from your provider may help you if you are having nasal congestion.  Getting settled-in. It takes more than one night for most of us to get used to wearing a mask. Try wearing just the mask during a quiet time during the day so your body adapts to wearing it. A humidifier is recommended for comfort in most cases. Our team will work with you carefully on the first day and will be in contact within 4 days and again at 2 and 4 weeks for advice and remote device adjustments. Your therapy is evaluated by the device each day.   Use it every night. The more you are able to sleep naturally for 7-8 hours, the more likely you will have good sleep and to prevent health risks or symptoms from sleep apnea. Even if you use it 4 hours it helps. Occasionally all of us are unable to use a medical therapy, in sleep apnea, it is not dangerous to miss one night.   Communicate. Call our skilled team on the number provided on the first day if your visit for problems that make it difficult to wear the device. Over 2 out of 3 patients can learn to wear the device long-term with help from our team. Remember to call our team or your sleep providers if you are unable to wear the device as we may have other solutions for those who cannot adapt to mask CPAP therapy. It is recommended that you sleep your sleep provider within the first 3 months and yearly after that if you are not having problems.   Use it for your health. We encourage use of CPAP masks during daytime quiet periods to allow your face and brain to adapt to the sensation of CPAP so that it will be a more natural sensation to awaken to at night or during naps. This can be very useful during the first few weeks or months of adapting to CPAP  though it does not help medically to wear CPAP during wakefulness and  should not be used as a strategy just to meet guidelines.  Take care of your equipment. Make sure you clean your mask and tubing using directions every day and that your filter and mask are replaced as recommended or if they are not working.     *Masks with magnets:  Updated Contraindications  Masks with magnetic components are contraindicated for use by patients where they, or anyone in close physical contact while using the mask, have the following:   Active medical implants that interact with magnets (i.e., pacemakers, implantable cardioverter defibrillators (ICD), neurostimulators, cerebrospinal fluid (CSF) shunts, insulin/infusion pumps)   Metallic implants/objects containing ferromagnetic material (i.e., aneurysm clips/flow disruption devices, embolic coils, stents, valves, electrodes, implants to restore hearing or balance with implanted magnets, ocular implants, metallic splinters in the eye)  Updated Warning  Keep the mask magnets at a safe distance of at least 6 inches (150 mm) away from implants or medical devices that may be adversely affected by magnetic interference. This warning applies to you or anyone in close physical contact with your mask. The magnets are in the frame and lower headgear clips, with a magnetic field strength of up to 400mT. When worn, they connect to secure the mask but may inadvertently detach while asleep.  Implants/medical devices, including those listed within contraindications, may be adversely affected if they change function under external magnetic fields or contain ferromagnetic materials that attract/repel to magnetic fields (some metallic implants, e.g., contact lenses with metal, dental implants, metallic cranial plates, screws, gildardo hole covers, and bone substitute devices). Consult your physician and  of your implant / other medical device for information on the potential adverse  effects of magnetic fields.    BESIDES CPAP, WHAT OTHER THERAPIES ARE THERE?    Positioning Device  Positioning devices are generally used when sleep apnea is mild and only occurs on your back.This example shows a pillow that straps around the waist. It may be appropriate for those whose sleep study shows milder sleep apnea that occurs primarily when lying flat on one's back. Preliminary studies have shown benefit but effectiveness at home may need to be verified by a home sleep test. These devices are generally not covered by medical insurance.  Examples of devices that maintain sleeping on the back to prevent snoring and mild sleep apnea.    Belt type body positioner  http://[employer].[facility]/    Electronic reminder  http://nightshifttherapy.com/            Oral Appliance  What is oral appliance therapy?  An oral appliance device fits on your teeth at night like a retainer used after having braces. The device is made by a specialized dentist and requires several visits over 1-2 months before a manufactured device is made to fit your teeth and is adjusted to prevent your sleep apnea. Once an oral device is working properly, snoring should be improved. A home sleep test may be recommended at that time if to determine whether the sleep apnea is adequately treated.       Some things to remember:  -Oral devices are often, but not always, covered by your medical insurance. Be sure to check with your insurance provider.   -If you are referred for oral therapy, you will be given a list of specialized dentists to consider or you may choose to visit the Web site of the American Academy of Dental Sleep Medicine  -Oral devices are less likely to work if you have severe sleep apnea or are extremely overweight.     More detailed information  An oral appliance is a small acrylic device that fits over the upper and lower teeth  (similar to a retainer or a mouth guard). This device slightly moves jaw forward, which moves the base of  the tongue forward, opens the airway, improves breathing for effective treat snoring and obstructive sleep apnea in perhaps 7 out of 10 people .  The best working devices are custom-made by a dental device  after a mold is made of the teeth 1, 2, 3.  When is an oral appliance indicated?  Oral appliance therapy is recommended as a first-line treatment for patients with primary snoring, mild sleep apnea, and for patients with moderate sleep apnea who prefer appliance therapy to use of CPAP4, 5. Severity of sleep apnea is determined by sleep testing and is based on the number of respiratory events per hour of sleep.   How successful is oral appliance therapy?  The success rate of oral appliance therapy in patients with mild sleep apnea is 75-80% while in patients with moderate sleep apnea it is 50-70%. The chance of success in patients with severe sleep apnea is 40-50%. The research also shows that oral appliances have a beneficial effect on the cardiovascular health of AUGIE patients at the same magnitude as CPAP therapy7.  Oral appliances should be a second-line treatment in cases of severe sleep apnea, but if not completely successful then a combination therapy utilizing CPAP plus oral appliance therapy may be effective. Oral appliances tend to be effective in a broad range of patients although studies show that the patients who have the highest success are females, younger patients, those with milder disease, and less severe obesity. 3, 6.   Finding a dentist that practices dental sleep medicine  Specific training is available through the American Academy of Dental Sleep Medicine for dentists interested in working in the field of sleep. To find a dentist who is educated in the field of sleep and the use of oral appliances, near you, visit the Web site of the American Academy of Dental Sleep Medicine.    References  1. Edwin et al. Objectively measured vs self-reported compliance during oral  appliance therapy for sleep-disordered breathing. Chest 2013; 144(5): 1341-6522.  2. Barbara, et al. Objective measurement of compliance during oral appliance therapy for sleep-disordered breathing. Thorax 2013; 68(1): 91-96.  3. Candido et al. Mandibular advancement devices in 620 men and women with AUGIE and snoring: tolerability and predictors of treatment success. Chest 2004; 125: 4718-6403.  4. Mauricio, et al. Oral appliances for snoring and AUGIE: a review. Sleep 2006; 29: 244-262.  5. Trenton et al. Oral appliance treatment for AUGIE: an update. J Clin Sleep Med 2014; 10(2): 215-227.  6. Tony et al. Predictors of OSAH treatment outcome. J Dent Res 2007; 86: 3910-3998.      Weight Loss:   Your Body mass index is 51.21 kg/m .    Being overweight does not necessarily mean you will have health consequences.  Those who have BMI over 35 or over 27 with existing medical conditions carries greater risk.   Weight loss decreases severity of sleep apnea in most people with obesity. For those with mild obesity who have developed snoring with weight gain, even 15-30 pound weight loss can improve and occasionally milder eliminate sleep apnea.  Structured and life-long dietary and health habits are necessary to lose weight and keep healthier weight levels.     The Comprehensive Weight loss program offers all aspects of weight loss strategies including two Non-Surgical Weight Loss Programs: Medical Weight Management and our 24 Week Healthy Lifestyle Program:    Medical Weight Management: You will meet with a Medical Weight Management Provider, as well as a Registered Dietician. The program may include medication therapy, dietary education, recommended exercise and physical therapy programs, monthly support group meetings, and possible psychological counseling. Follow up visits with the provider or dietician are scheduled based on your progress and needs.    24 Week Healthy Lifestyle Program: This unique program is  designed to give you the support of weekly appointments and activities thru a 24-week period. It may include all of the components of the basic program (above), with the addition of 11 individual Health  Visits, 24-week access to the [facility] website for over 700 online classes, and monthly support group meetings. This program has an out-of-pocket expense of $499 to cover the items that can not be billed to insurance (health coaches and [facility] access), and is non-refundable/non-transferable (you may be able to use a Health Savings Account; ask your HSA provider). There may be an optional meal replacement plan prescribed as well.   Surgical management achieves meaningful long-term weight loss and improvement in health risks in most patients with more severe obesity.      Sleep Apnea Surgery:    Surgery for obstructive sleep apnea is considered generally only when other therapies fail to work. Surgery may be discussed with you if you are having a difficult time tolerating CPAP and or when there is an abnormal structure that requires surgical correction.  Nose and throat surgeries often enlarge the airway to prevent collapse.  Most of these surgeries create pain for 1-2 weeks and up to half of the most common surgeries are not effective throughout life.  You should carefully discuss the benefits and drawbacks to surgery with your sleep provider and surgeon to determine if it is the best solution for you.   More information  Surgery for AUGIE is directed at areas that are responsible for narrowing or complete obstruction of the airway during sleep.  There are a wide range of procedures available to enlarge and/or stabilize the airway to prevent blockage of breathing in the three major areas where it can occur: the palate, tongue, and nasal regions.  Successful surgical treatment depends on the accurate identification of the factors responsible for obstructive sleep apnea in each person.  A personalized approach  is required because there is no single treatment that works well for everyone.  Because of anatomic variation, consultation with an examination by a sleep surgeon is a critical first step in determining what surgical options are best for each patient.  In some cases, examination during sedation may be recommended in order to guide the selection of procedures.  Patients will be counseled about risks and benefits as well as the typical recovery course after surgery. Surgery is typically not a cure for a person s AUGIE.  However, surgery will often significantly improve one s AUGIE severity (termed  success rate ).  Even in the absence of a cure, surgery will decrease the cardiovascular risk associated with OSA7; improve overall quality of life8 (sleepiness, functionality, sleep quality, etc).      Palate Procedures:  Patients with AUGIE often have narrowing of their airway in the region of their tonsils and uvula.  The goals of palate procedures are to widen the airway in this region as well as to help the tissues resist collapse.  Modern palate procedure techniques focus on tissue conservation and soft tissue rearrangement, rather than tissue removal.  Often the uvula is preserved in this procedure. Residual sleep apnea is common in patient after pharyngoplasty with an average reduction in sleep apnea events of 33%2.      Tongue Procedures:  ExamWhile patients are awake, the muscles that surround the throat are active and keep this region open for breathing. These muscles relax during sleep, allowing the tongue and other structures to collapse and block breathing.  There are several different tongue procedures available.  Selection of a tongue base procedure depends on characteristics seen on physical exam.  Generally, procedures are aimed at removing bulky tissues in this area or preventing the back of the tongue from falling back during sleep.  Success rates for tongue surgery range from 50-62%3.    Hypoglossal Nerve  Stimulation:  Hypoglossal nerve stimulation has recently received approval from the [place] Food and Drug Administration for the treatment of obstructive sleep apnea.  This is based on research showing that the system was safe and effective in treating sleep apnea6.  Results showed that the median AHI score decreased 68%, from 29.3 to 9.0. This therapy uses an implant system that senses breathing patterns and delivers mild stimulation to airway muscles, which keeps the airway open during sleep.  The system consists of three fully implanted components: a small generator (similar in size to a pacemaker), a breathing sensor, and a stimulation lead.  Using a small handheld remote, a patient turns the therapy on before bed and off upon awakening.    Candidates for this device must be greater than 18 years of age, have moderate to severe obstructive sleep apnea with less than 25% central events  (AHI between 15-65), BMI less than 35, have tried CPAP/oral appliance for at least 8 weeks without success, and have appropriate upper airway anatomy (determined by a sleep endoscopy performed by Dr. Parrish Armendariz or Dr. King Jimenez).    Nasal Procedures:  Nasal obstruction can interfere with nasal breathing during the day and night.  Studies have shown that relief of nasal obstruction can improve the ability of some patients to tolerate positive airway pressure therapy for obstructive sleep apnea1.  Treatment options include medications such as nasal saline, topical corticosteroid and antihistamine sprays, and oral medications such as antihistamines or decongestants. Non-surgical treatments can include external nasal dilators for selected patients. If these are not successful by themselves, surgery can improve the nasal airway either alone or in combination with these other options.        Combination Procedures:  Combination of surgical procedures and other treatments may be recommended, particularly if patients have more  than one area of narrowing or persistent positional disease.  The success rate of combination surgery ranges from 66-80%2,3.    References  Vanessa JIMENEZ. The Role of the Nose in Snoring and Obstructive Sleep Apnoea: An Update.  Eur Arch Otorhinolaryngol. 2011; 268: 1365-73.   Enrique SM; Arnaud JA; Randy JR; Pallanch JF; Gilberto MB; Maxine SG; Erna MCDANIELS. Surgical modifications of the upper airway for obstructive sleep apnea in adults: a systematic review and meta-analysis. SLEEP 2010;33(10):1528-5538. Trena BILLINGSLEY. Hypopharyngeal surgery in obstructive sleep apnea: an evidence-based medicine review.  Arch Otolaryngol Head Neck Surg. 2006 Feb;132(2):206-13.  Jaya YH1, Uzair Y, Bj MAURO. The efficacy of anatomically based multilevel surgery for obstructive sleep apnea. Otolaryngol Head Neck Surg. 2003 Oct;129(4):327-35.  Kezirian E, Goldberg A. Hypopharyngeal Surgery in Obstructive Sleep Apnea: An Evidence-Based Medicine Review. Arch Otolaryngol Head Neck Surg. 2006 Feb;132(2):206-13.  Amelia PJ et al. Upper-Airway Stimulation for Obstructive Sleep Apnea.  N Engl J Med. 2014 Jan 9;370(2):139-49.  Lee Y et al. Increased Incidence of Cardiovascular Disease in Middle-aged Men with Obstructive Sleep Apnea. Am J Respir Crit Care Med; 2002 166: 159-165  Delcid EM et al. Studying Life Effects and Effectiveness of Palatopharyngoplasty (SLEEP) study: Subjective Outcomes of Isolated Uvulopalatopharyngoplasty. Otolaryngol Head Neck Surg. 2011; 144: 623-631.        WHAT IF I ONLY HAVE SNORING?    Mandibular advancement devices, lateral sleep positioning, long-term weight loss and treatment of nasal allergies have been shown to improve snoring.  Exercising tongue muscles with a game (https://apps.digitalbox/us/mateo/soundly-reduce-snoring/lb3190878714) or stimulating the tongue during the day with a device (https://doi.org/10.3390/cpx17241549) have improved snoring in some  individuals.  https://www.food.de.[employer])/  https://www.sleepfoundation.org/best-anti-snoring-mouthpieces-and-mouthguards    Remember to Drive Safe... Drive Alive     Sleep health profoundly affects your health, mood, and your safety.  Thirty three percent of the population (one in three of us) is not getting enough sleep and many have a sleep disorder. Not getting enough sleep or having an untreated / undertreated sleep condition may make us sleepy without even knowing it. In fact, our driving could be dramatically impaired due to our sleep health. As your provider, here are some things I would like you to know about driving:     Here are some warning signs for impairment and dangerous drowsy driving:              -Having been awake more than 16 hours               -Looking tired               -Eyelid drooping              -Head nodding (it could be too late at this point)              -Driving for more than 30 minutes     Some things you could do to make the driving safer if you are experiencing some drowsiness:              -Stop driving and rest              -Call for transportation              -Make sure your sleep disorder is adequately treated     Some things that have been shown NOT to work when experiencing drowsiness while driving:              -Turning on the radio              -Opening windows              -Eating any  distracting  /  entertaining  foods (e.g., sunflower seeds, candy, or any other)              -Talking on the phone      Your decision may not only impact your life, but also the life of others. Please, remember to drive safe for yourself and all of us.

## 2024-08-20 NOTE — PROGRESS NOTES
Medication Therapy Management (MTM) Encounter    ASSESSMENT:                            Medication Adherence/Access: No issues identified    Mental Health - anxiety   Patient would benefit from trial up on bupropion dose to improve motivation, which is impacting her exercise and diet goals.    Weight Management and GERD  Patient would benefit from titration in semaglutide to maximum dose for greater weight loss potential. Suggest increase in PPI to help alleviate mild side effects from semaglutide therapy. I would expect with her improving her diet, longer duration on semaglutide and experiencing greater weight loss for GERD/GI side effects to improve.     PLAN:                            Day of and day after Wegovy, may increase omeprazole to 40 mg.     After completing 4 weeks on Wegovy 1.7 mg, then increase to 2.4 mg weekly.    Increase bupropion  mg in the morning     Great idea to start tracking weekly weight and food diary. Record and we can discuss at follow-up.    Try to set a goal to go on a walk after dinner with your partner to increase activity.     Follow-up: Return in about 6 weeks (around 10/1/2024) for Medication Therapy Telephone Visit.    SUBJECTIVE/OBJECTIVE:                          Dora Barrera is a 44 year old female seen for a follow-up visit.       Reason for visit: weight management.    Allergies/ADRs: Reviewed in chart  Past Medical History: Reviewed in chart  Tobacco: She reports that she has never smoked. She has been exposed to tobacco smoke. She has never used smokeless tobacco.  Alcohol: 1-4 beverages / week    Medication Adherence/Access: no issues reported    Mental Health - anxiety  Lexapro 20 mg daily  Bupropion  mg in the morning   propranolol 20 mg twice daily as needed for migraine/anxiety  She reports no side effects. She does feel the bupropion has given her a little boost but continues to struggle with low motivation and feels her anxiety could be better  "controlled. She is interested in bupropion dose adjustment.   She has worked with therapist in the past, not currently.  Tried: fluoxetine - worsened mood + fatigue     Weight Management and GERD  Wegovy 1.7 mg weekly on Friday (2 shots)  Omeprazole 20 mg in the morning   She reports nausea the day of and day after taking, notable this dose increase. She has notice more GERD and reflux. Normal movement.   Diet:   Avoids juice. She reports made some progress in reducing regular pop/lemonade about 3 times a week. She reports unfortunately minimally changes with her diet. Still goes out to eat frequently. She has tracked diet in the past with [employer] which was helpful, not currently.   Exercise:  She reports not as active but her boyfriend has been better walking at night but she doesn't like to walk that late.   Failed medications include:   - topiramate cause too much sedation and minimal benefit  - the phentermine helped with energy but when she ran out of the medication cause her to \"crash\" and it did help with her appetite suppression. Likely per chart up her 37.5 mg dose.      Current weight today: no home weight, but thinks it maybe helpful for her to do so, has scale at home  Wt Readings from Last 4 Encounters:   08/20/24 280 lb (127 kg)   06/27/23 273 lb 8 oz (124.1 kg)   01/16/23 272 lb 1.6 oz (123.4 kg)   08/04/22 278 lb 4.8 oz (126.2 kg)     Estimated body mass index is 51.21 kg/m  as calculated from the following:    Height as of an earlier encounter on 8/20/24: 5' 2\" (1.575 m).    Weight as of an earlier encounter on 8/20/24: 280 lb (127 kg).      Today's Vitals: There were no vitals taken for this visit.  ----------------      I spent 21 minutes with this patient today. All changes were made via collaborative practice agreement with Jeff Contreras MD. A copy of the visit note was provided to the patient's provider(s).    A summary of these recommendations was sent via [employer].    Oliva " Fabio Maddox  Medication Therapy Management Pharmacist    Telemedicine Visit Details  Type of service:  Video Conference via [employer]  Start Time:  8:33 AM  End Time: 8:54 AM     Medication Therapy Recommendations  Anxiety    Current Medication: buPROPion (WELLBUTRIN XL) 300 MG 24 hr tablet   Rationale: Dose too low - Dosage too low - Effectiveness   Recommendation: Increase Dose   Status: Accepted per CPA         Gastroesophageal reflux disease with esophagitis    Current Medication: omeprazole (PRILOSEC) 20 MG DR capsule   Rationale: Dose too low - Dosage too low - Effectiveness   Recommendation: Increase Dose   Status: Accepted per CPA         Morbid obesity (H)    Current Medication: Semaglutide-Weight Management (WEGOVY) 1.7 MG/0.75ML pen (Discontinued)   Rationale: Dose too low - Dosage too low - Effectiveness   Recommendation: Increase Dose   Status: Accepted per CPA

## 2024-09-01 ENCOUNTER — HEALTH MAINTENANCE LETTER (OUTPATIENT)
Age: 44
End: 2024-09-01

## 2024-09-05 NOTE — PROGRESS NOTES
Assessment & Plan     Benign essential hypertension  BP readings not at goal x2, borderline elevated. Pt states she is having some headaches with this. Discussed treatment options including working on lifestyle changes vs med changes and pt would like to increase lisinopril today. Had recent normal Cr and K. Discussed the relationship between weight and BP and   - lisinopril (ZESTRIL) 20 MG tablet; Take 1.5 tablets (30 mg) by mouth daily    BMI > 40.0 (H)  Has tried topamax in the past. Admits to emotional eating. To see weight clinic for further treatment options.   - COMPREHENSIVE WEIGHT MANAGEMENT    Anxiety  She is on lexapro. Working with therapist. Feels she is managing this for now.    Uses birth control  Does have risks including obesity (category 2 risk) and HTN (category 3) with long term OCP use. She tells me she is on this for 'cysts' of her ovaries, does not think this is PCOS. Do not feel she needs to stop OCP today as she has been on this since her teen years but discussed risks and should be continued to be monitored/discussed.       Patient Instructions   Increase the lisinopril to 30 mg.  Please send an update of your blood pressure readings over the next couple of weeks.    II think it would be a good idea to return to the weight clinic, referral placed.    Follow-up in 1 month on blood pressure/anxiety/weight      Return in about 1 month (around 5/1/2021) for Routine Visit.    Megan Mtz NP  [facility] John is a 40 year old who presents for the following health issues     HPI     Hypertension Follow-up  See My Chart message from patient yesterday - concerns w/ BP at [facility] appts      Do you check your blood pressure regularly outside of the clinic? No     Are you following a low salt diet? Yes    Are your blood pressures ever more than 140 on the top number (systolic) OR more   than 90 on the bottom number (diastolic), for example 140/90? Yes  September 5, 2024      Yannick Contreras  [address] [place] [id number]              To Whom It May Concern:    Yannick Contreras was seen on 9/5/2024. Please excuse his absence this morning.          Sincerely,        Nuris Osorio PA-C/ag     "      Pt had endoscopy/colonoscopy done at [facility] 2 weeks ago and was told her BPs were high, SBP 140s she believes.  Feels she has some headaches as well as feeling her pulse in her ears from high blood pressure  Denies chest pain, dyspnea, lightheadedness, dizziness, edema, fatigue.  Has gained weight since last OV. 20# since September.  Admits she has been going through stress/grief, lost her mom last year.  Eats when she is stressed.  Tried doing weight watchers but didn't follow through with this. Considering Nutrisystem.  Had tried Topamax (given by PCP) but stopped, didn't like the evening dosing.  Working with therapist through work and family counselor with her dad.    Review of Systems   Constitutional, HEENT, cardiovascular, pulmonary, gi and gu systems are negative, except as otherwise noted.      Objective    BP (!) 142/92 (BP Location: Right arm, Cuff Size: Adult Large)   Pulse 92   Temp 98  F (36.7  C) (Tympanic)   Resp 14   Ht 1.588 m (5' 2.5\")   Wt 123.6 kg (272 lb 8 oz)   SpO2 96%   BMI 49.05 kg/m    Body mass index is 49.05 kg/m .  Physical Exam   GENERAL: healthy, alert and no distress  RESP: lungs clear to auscultation - no rales, rhonchi or wheezes  CV: regular rate and rhythm, normal S1 S2, no S3 or S4, no murmur, click or rub, no peripheral edema and peripheral pulses strong                "

## 2024-09-20 ENCOUNTER — OFFICE VISIT (OUTPATIENT)
Dept: PEDIATRICS | Facility: CLINIC | Age: 44
End: 2024-09-20
Payer: COMMERCIAL

## 2024-09-20 ENCOUNTER — TELEPHONE (OUTPATIENT)
Dept: PEDIATRICS | Facility: CLINIC | Age: 44
End: 2024-09-20

## 2024-09-20 VITALS
BODY MASS INDEX: 50.62 KG/M2 | HEIGHT: 63 IN | WEIGHT: 285.7 LBS | HEART RATE: 93 BPM | OXYGEN SATURATION: 94 % | RESPIRATION RATE: 16 BRPM | TEMPERATURE: 98.2 F | DIASTOLIC BLOOD PRESSURE: 78 MMHG | SYSTOLIC BLOOD PRESSURE: 114 MMHG

## 2024-09-20 DIAGNOSIS — R73.03 PREDIABETES: ICD-10-CM

## 2024-09-20 DIAGNOSIS — Z12.31 ENCOUNTER FOR SCREENING MAMMOGRAM FOR BREAST CANCER: ICD-10-CM

## 2024-09-20 DIAGNOSIS — Z13.220 SCREENING CHOLESTEROL LEVEL: ICD-10-CM

## 2024-09-20 DIAGNOSIS — Z12.4 CERVICAL CANCER SCREENING: ICD-10-CM

## 2024-09-20 DIAGNOSIS — E66.01 MORBID OBESITY (H): ICD-10-CM

## 2024-09-20 DIAGNOSIS — Z00.00 ROUTINE GENERAL MEDICAL EXAMINATION AT A HEALTH CARE FACILITY: Primary | ICD-10-CM

## 2024-09-20 DIAGNOSIS — E55.9 VITAMIN D DEFICIENCY: ICD-10-CM

## 2024-09-20 DIAGNOSIS — G89.29 CHRONIC LEFT SHOULDER PAIN: ICD-10-CM

## 2024-09-20 DIAGNOSIS — R73.01 ELEVATED FASTING GLUCOSE: ICD-10-CM

## 2024-09-20 DIAGNOSIS — I10 BENIGN ESSENTIAL HYPERTENSION: ICD-10-CM

## 2024-09-20 DIAGNOSIS — M25.512 CHRONIC LEFT SHOULDER PAIN: ICD-10-CM

## 2024-09-20 DIAGNOSIS — G43.829 MENSTRUAL MIGRAINE WITHOUT STATUS MIGRAINOSUS, NOT INTRACTABLE: ICD-10-CM

## 2024-09-20 DIAGNOSIS — F41.9 ANXIETY: ICD-10-CM

## 2024-09-20 LAB
ALBUMIN SERPL BCG-MCNC: 3.9 G/DL (ref 3.5–5.2)
ALP SERPL-CCNC: 65 U/L (ref 40–150)
ALT SERPL W P-5'-P-CCNC: 33 U/L (ref 0–50)
ANION GAP SERPL CALCULATED.3IONS-SCNC: 12 MMOL/L (ref 7–15)
AST SERPL W P-5'-P-CCNC: 33 U/L (ref 0–45)
BILIRUB SERPL-MCNC: 0.5 MG/DL
BUN SERPL-MCNC: 15.4 MG/DL (ref 6–20)
CALCIUM SERPL-MCNC: 8.8 MG/DL (ref 8.8–10.4)
CHLORIDE SERPL-SCNC: 100 MMOL/L (ref 98–107)
CHOLEST SERPL-MCNC: 175 MG/DL
CREAT SERPL-MCNC: 0.75 MG/DL (ref 0.51–0.95)
CREAT UR-MCNC: 163 MG/DL
EGFRCR SERPLBLD CKD-EPI 2021: >90 ML/MIN/1.73M2
EST. AVERAGE GLUCOSE BLD GHB EST-MCNC: 123 MG/DL
FASTING STATUS PATIENT QL REPORTED: NO
FASTING STATUS PATIENT QL REPORTED: NO
GLUCOSE SERPL-MCNC: 107 MG/DL (ref 70–99)
HBA1C MFR BLD: 5.9 % (ref 0–5.6)
HCO3 SERPL-SCNC: 27 MMOL/L (ref 22–29)
HDLC SERPL-MCNC: 51 MG/DL
HPV HR 12 DNA CVX QL NAA+PROBE: NEGATIVE
HPV16 DNA CVX QL NAA+PROBE: NEGATIVE
HPV18 DNA CVX QL NAA+PROBE: NEGATIVE
HUMAN PAPILLOMA VIRUS FINAL DIAGNOSIS: NORMAL
LDLC SERPL CALC-MCNC: 98 MG/DL
MICROALBUMIN UR-MCNC: <12 MG/L
MICROALBUMIN/CREAT UR: NORMAL MG/G{CREAT}
NONHDLC SERPL-MCNC: 124 MG/DL
POTASSIUM SERPL-SCNC: 3.3 MMOL/L (ref 3.4–5.3)
PROT SERPL-MCNC: 7.1 G/DL (ref 6.4–8.3)
SODIUM SERPL-SCNC: 139 MMOL/L (ref 135–145)
TRIGL SERPL-MCNC: 130 MG/DL
VIT D+METAB SERPL-MCNC: 20 NG/ML (ref 20–50)

## 2024-09-20 PROCEDURE — 83036 HEMOGLOBIN GLYCOSYLATED A1C: CPT | Performed by: INTERNAL MEDICINE

## 2024-09-20 PROCEDURE — 80061 LIPID PANEL: CPT | Performed by: INTERNAL MEDICINE

## 2024-09-20 PROCEDURE — 90656 IIV3 VACC NO PRSV 0.5 ML IM: CPT | Performed by: INTERNAL MEDICINE

## 2024-09-20 PROCEDURE — 82570 ASSAY OF URINE CREATININE: CPT | Performed by: INTERNAL MEDICINE

## 2024-09-20 PROCEDURE — 99214 OFFICE O/P EST MOD 30 MIN: CPT | Mod: 25 | Performed by: INTERNAL MEDICINE

## 2024-09-20 PROCEDURE — 87624 HPV HI-RISK TYP POOLED RSLT: CPT | Performed by: INTERNAL MEDICINE

## 2024-09-20 PROCEDURE — 91320 SARSCV2 VAC 30MCG TRS-SUC IM: CPT | Performed by: INTERNAL MEDICINE

## 2024-09-20 PROCEDURE — G0145 SCR C/V CYTO,THINLAYER,RESCR: HCPCS | Performed by: INTERNAL MEDICINE

## 2024-09-20 PROCEDURE — 82306 VITAMIN D 25 HYDROXY: CPT | Performed by: INTERNAL MEDICINE

## 2024-09-20 PROCEDURE — 90480 ADMN SARSCOV2 VAC 1/ONLY CMP: CPT | Performed by: INTERNAL MEDICINE

## 2024-09-20 PROCEDURE — 80053 COMPREHEN METABOLIC PANEL: CPT | Performed by: INTERNAL MEDICINE

## 2024-09-20 PROCEDURE — 90471 IMMUNIZATION ADMIN: CPT | Performed by: INTERNAL MEDICINE

## 2024-09-20 PROCEDURE — 82043 UR ALBUMIN QUANTITATIVE: CPT | Performed by: INTERNAL MEDICINE

## 2024-09-20 PROCEDURE — 99396 PREV VISIT EST AGE 40-64: CPT | Mod: 25 | Performed by: INTERNAL MEDICINE

## 2024-09-20 PROCEDURE — 36415 COLL VENOUS BLD VENIPUNCTURE: CPT | Performed by: INTERNAL MEDICINE

## 2024-09-20 RX ORDER — NORGESTIMATE AND ETHINYL ESTRADIOL 0.25-0.035
1 KIT ORAL DAILY
Qty: 90 TABLET | Refills: 3 | Status: SHIPPED | OUTPATIENT
Start: 2024-09-20

## 2024-09-20 RX ORDER — BUPROPION HYDROCHLORIDE 150 MG/1
300 TABLET ORAL EVERY MORNING
Qty: 180 TABLET | Refills: 3 | Status: SHIPPED | OUTPATIENT
Start: 2024-09-20

## 2024-09-20 RX ORDER — ESCITALOPRAM OXALATE 20 MG/1
20 TABLET ORAL DAILY
Qty: 90 TABLET | Refills: 3 | Status: SHIPPED | OUTPATIENT
Start: 2024-09-20

## 2024-09-20 RX ORDER — LISINOPRIL AND HYDROCHLOROTHIAZIDE 12.5; 2 MG/1; MG/1
1 TABLET ORAL DAILY
Qty: 90 TABLET | Refills: 3 | Status: SHIPPED | OUTPATIENT
Start: 2024-09-20

## 2024-09-20 ASSESSMENT — PAIN SCALES - GENERAL: PAINLEVEL: MODERATE PAIN (5)

## 2024-09-20 NOTE — PROGRESS NOTES
Preventive Care Visit  [facility] ERMA Contreras MD, Internal Medicine - Pediatrics  Sep 20, 2024      Assessment & Plan       ICD-10-CM    1. Routine general medical examination at a health care facility  Z00.00       2. Benign essential hypertension  I10 Albumin Random Urine Quantitative with Creat Ratio     lisinopril-hydrochlorothiazide (ZESTORETIC) 20-12.5 MG tablet     Albumin Random Urine Quantitative with Creat Ratio      3. Anxiety  F41.9 buPROPion (WELLBUTRIN XL) 150 MG 24 hr tablet     escitalopram (LEXAPRO) 20 MG tablet      4. Menstrual migraine without status migrainosus, not intractable  G43.829       5. BMI > 40.0 (H)  E66.01       6. Vitamin D deficiency  E55.9 Vitamin D Deficiency      7. Chronic left shoulder pain  M25.512 Physical Therapy  Referral    G89.29       8. Encounter for screening mammogram for breast cancer  Z12.31 MA Screen Bilateral w/Gt      9. Elevated fasting glucose  R73.01 Hemoglobin A1c      10. Prediabetes  R73.03       11. Cervical cancer screening  Z12.4 HPV and Gynecologic Cytology Panel - Recommended Age 30 - 65 Years      12. Screening cholesterol level  Z13.220 Lipid panel reflex to direct LDL Non-fasting     Lipid panel reflex to direct LDL Non-fasting     CANCELED: Lipid panel reflex to direct LDL Fasting        Migraines  - let me check w/ Oliva about the birth control   - other things we talked about - upping propranol and/or adding Imitirex    Mental health  - I'd like to see how the wellbutrin is in a few more weeks  - could consider 150 mg xl twice daily     Hiccuping/burping  - if it tracks really closely w/ diet I'd work on this first  - if happening even when you're pretty good about diet let me know    Wegovy  - keeping it at 2.4 for a bit more  - pick 1-2 things to change  --- myfitness pal  --- the days you know are hard  --- let it go if a day doesn't go well  - would consider trying for zepbound in the future if  "we need to     Physical therapy for shoulder. Let me know if not getting any better after 3-4 sessions -> Sports Med.      BMI  Estimated body mass index is 51.42 kg/m  as calculated from the following:    Height as of this encounter: 1.588 m (5' 2.5\").    Weight as of this encounter: 129.6 kg (285 lb 11.2 oz).       Counseling  Appropriate preventive services were addressed with this patient via screening, questionnaire, or discussion as appropriate for fall prevention, nutrition, physical activity, Tobacco-use cessation, social engagement, weight loss and cognition.  Checklist reviewing preventive services available has been given to the patient.  Reviewed patient's diet, addressing concerns and/or questions.     Subjective   Dora is a 44 year old, presenting for the following:  Physical        9/20/2024     9:14 AM   Additional Questions   Roomed by Marina MAHMOOD   Accompanied by None         9/20/2024     9:14 AM   Patient Reported Additional Medications   Patient reports taking the following new medications None        Health Care Directive  Patient does not have a Health Care Directive or Living Will: Patient states has Advance Directive and will bring in a copy to clinic.    HPI    # Social   - training in 2 new work people  - boyfriend is doing well  - drove to [place]     # Anxiety  - recently increased wellbutrin to 300  --- feels jittery a bit; more edgy  - lexapro 20  - propranolol 20 mg twice daily as needed for migraine/anxiety     # Migraine  - propranolol 20 bid - few days before and few days after  - does feel maybe it does something   - last one was the worst ever  - once a month during her period, last the whole time 2-4 days  - used to be on birth control and they were better    # Weight  Wt Readings from Last 4 Encounters:   09/20/24 129.6 kg (285 lb 11.2 oz)   08/20/24 127 kg (280 lb)   06/27/23 124.1 kg (273 lb 8 oz)   01/16/23 123.4 kg (272 lb 1.6 oz)   - recently moved " wegovy to 2.4 mg weekly   - topamax = sedation  - phentermie = cased crash    # Heartburn  - constantly hiccuping and burping  - taking the omeprazole 20-40   - it does track w/ food - bigger meals, red sauce, alcohol    # Fatigue  - tired all the time  - has a sleep study next month  - snoring comes and goes        9/16/2024   General Health   How would you rate your overall physical health? Good   Feel stress (tense, anxious, or unable to sleep) To some extent      (!) STRESS CONCERN      9/16/2024   Nutrition   Three or more servings of calcium each day? (!) NO   Diet: Regular (no restrictions)   How many servings of fruit and vegetables per day? (!) 0-1   How many sweetened beverages each day? 0-1            9/16/2024   Exercise   Days per week of moderate/strenous exercise 0 days   Average minutes spent exercising at this level 0 min      (!) EXERCISE CONCERN      9/16/2024   Social Factors   Frequency of gathering with friends or relatives Once a week   Worry food won't last until get money to buy more No   Food not last or not have enough money for food? No   Do you have housing? (Housing is defined as stable permanent housing and does not include staying ouside in a car, in a tent, in an abandoned building, in an overnight shelter, or couch-surfing.) Yes   Are you worried about losing your housing? No   Lack of transportation? No   Unable to get utilities (heat,electricity)? No            9/16/2024   Dental   Dentist two times every year? Yes            9/16/2024   TB Screening   Were you born outside of the [place]? No            Today's PHQ-2 Score:       9/20/2024     9:11 AM   PHQ-2 ( 1999 [employer])   Q1: Little interest or pleasure in doing things 1   Q2: Feeling down, depressed or hopeless 1   PHQ-2 Score 2   Q1: Little interest or pleasure in doing things Several days   Q2: Feeling down, depressed or hopeless Several days   PHQ-2 Score 2           9/16/2024   Substance Use   Alcohol more than 3/day or more  than 7/wk No   Do you use any other substances recreationally? No        Social History     Tobacco Use    Smoking status: Never     Passive exposure: Current    Smokeless tobacco: Never   Vaping Use    Vaping status: Never Used   Substance Use Topics    Alcohol use: Yes     Alcohol/week: 4.0 standard drinks of alcohol     Types: 4 Glasses of wine per week    Drug use: Never           8/24/2022   LAST FHS-7 RESULTS   1st degree relative breast or ovarian cancer No   Any relative bilateral breast cancer No   Any male have breast cancer No   Any ONE woman have BOTH breast AND ovarian cancer No   Any woman with breast cancer before 50yrs No   2 or more relatives with breast AND/OR ovarian cancer No   2 or more relatives with breast AND/OR bowel cancer Yes           Mammogram Screening - Mammogram every 1-2 years updated in Health Maintenance based on mutual decision making        9/16/2024   STI Screening   New sexual partner(s) since last STI/HIV test? No        History of abnormal Pap smear: No - age 30- 64 PAP with HPV every 5 years recommended        Latest Ref Rng & Units 9/29/2021     7:49 AM 12/5/2016    12:00 AM 11/25/2015    12:00 AM   PAP / HPV   PAP  Negative for Intraepithelial Lesion or Malignancy (NILM)      HPV 16 DNA Negative Negative      HPV 18 DNA Negative Negative      Other HR HPV Negative Negative      PAP-ABSTRACT   See Scanned Document     See Scanned Document           This result is from an external source.     ASCVD Risk   The 10-year ASCVD risk score (Winter RODRIGUEZ, et al., 2019) is: 0.4%    Values used to calculate the score:      Age: 44 years      Sex: Female      Is Non- : No      Diabetic: No      Tobacco smoker: No      Systolic Blood Pressure: 114 mmHg      Is BP treated: Yes      HDL Cholesterol: 59 mg/dL      Total Cholesterol: 144 mg/dL        9/16/2024   Contraception/Family Planning   Questions about contraception or family planning (!) YES           "  Reviewed and updated as needed this visit by Provider                         Objective    Exam  /78 (BP Location: Right arm, Patient Position: Sitting, Cuff Size: Adult Large)   Pulse 93   Temp 98.2  F (36.8  C) (Temporal)   Resp 16   Ht 1.588 m (5' 2.5\")   Wt 129.6 kg (285 lb 11.2 oz)   LMP 08/25/2024 (Exact Date)   SpO2 94%   BMI 51.42 kg/m     Estimated body mass index is 51.42 kg/m  as calculated from the following:    Height as of this encounter: 1.588 m (5' 2.5\").    Weight as of this encounter: 129.6 kg (285 lb 11.2 oz).    Physical Exam  GENERAL: alert and no distress  EYES: Eyes grossly normal to inspection, PERRL and conjunctivae and sclerae normal  HENT: ear canals and TM's normal, nose and mouth without ulcers or lesions  NECK: no adenopathy, no asymmetry, masses, or scars  RESP: lungs clear to auscultation - no rales, rhonchi or wheezes  CV: regular rate and rhythm, normal S1 S2, no S3 or S4, no murmur, click or rub, no peripheral edema  ABDOMEN: soft, nontender, no hepatosplenomegaly, no masses and bowel sounds normal   (female) w/bimanual: normal female external genitalia, normal urethral meatus, normal vaginal mucosa, and normal cervix/adnexa/uterus without masses or discharge  MS: left shoulder pain over lateral aspect, good rom aside from difficult putting arm behind back  SKIN: no suspicious lesions or rashes  NEURO: Normal strength and tone, mentation intact and speech normal  PSYCH: mentation appears normal, affect normal/bright        Signed Electronically by: Jeff Contreras MD    "

## 2024-09-20 NOTE — TELEPHONE ENCOUNTER
Jeff Contreras MD [facility]  Please call pt - let her know restarting OCPs. Stop propranolol. I don't think she was taking OCP continuously before but that is an option. I would just need to rewrite script. Takes 3 mo for body to reregulate so would stay on this until she and I talk again in 4 mo. EDUARDO    Called the patient at [phone] and left a message requesting a call back   - Provided call back number  - Awaiting a call back at this time     Heather WAGONER RN   [facility]

## 2024-09-20 NOTE — PATIENT INSTRUCTIONS
Migraines  - let me check w/ Oliva about the birth control   - other things we talked about - upping propranol and/or adding Imitirex    Mental health  - I'd like to see how the wellbutrin is in a few more weeks  - could consider 150 mg xl twice daily     Hiccuping/burping  - if it tracks really closely w/ diet I'd work on this first  - if happening even when you're pretty good about diet let me know    Wegovy  - keeping it at 2.4 for a bit more  - pick 1-2 things to change  --- myfitness pal  --- the days you know are hard  --- let it go if a day doesn't go well  - would consider trying for zepbound in the future if we need to     Physical therapy for shoulder. Let me know if not getting any better after 3-4 sessions -> Sports Med.      Patient Education   Preventive Care Advice   This is general advice given by our system to help you stay healthy. However, your care team may have specific advice just for you. Please talk to your care team about your preventive care needs.  Nutrition  Eat 5 or more servings of fruits and vegetables each day.  Try wheat bread, brown rice and whole grain pasta (instead of white bread, rice, and pasta).  Get enough calcium and vitamin D. Check the label on foods and aim for 100% of the RDA (recommended daily allowance).  Lifestyle  Exercise at least 150 minutes each week  (30 minutes a day, 5 days a week).  Do muscle strengthening activities 2 days a week. These help control your weight and prevent disease.  No smoking.  Wear sunscreen to prevent skin cancer.  Have a dental exam and cleaning every 6 months.  Yearly exams  See your health care team every year to talk about:  Any changes in your health.  Any medicines your care team has prescribed.  Preventive care, family planning, and ways to prevent chronic diseases.  Shots (vaccines)   HPV shots (up to age 26), if you've never had them before.  Hepatitis B shots (up to age 59), if you've never had them before.  COVID-19 shot: Get  this shot when it's due.  Flu shot: Get a flu shot every year.  Tetanus shot: Get a tetanus shot every 10 years.  Pneumococcal, hepatitis A, and RSV shots: Ask your care team if you need these based on your risk.  Shingles shot (for age 50 and up)  General health tests  Diabetes screening:  Starting at age 35, Get screened for diabetes at least every 3 years.  If you are younger than age 35, ask your care team if you should be screened for diabetes.  Cholesterol test: At age 39, start having a cholesterol test every 5 years, or more often if advised.  Bone density scan (DEXA): At age 50, ask your care team if you should have this scan for osteoporosis (brittle bones).  Hepatitis C: Get tested at least once in your life.  STIs (sexually transmitted infections)  Before age 24: Ask your care team if you should be screened for STIs.  After age 24: Get screened for STIs if you're at risk. You are at risk for STIs (including HIV) if:  You are sexually active with more than one person.  You don't use condoms every time.  You or a partner was diagnosed with a sexually transmitted infection.  If you are at risk for HIV, ask about PrEP medicine to prevent HIV.  Get tested for HIV at least once in your life, whether you are at risk for HIV or not.  Cancer screening tests  Cervical cancer screening: If you have a cervix, begin getting regular cervical cancer screening tests starting at age 21.  Breast cancer scan (mammogram): If you've ever had breasts, begin having regular mammograms starting at age 40. This is a scan to check for breast cancer.  Colon cancer screening: It is important to start screening for colon cancer at age 45.  Have a colonoscopy test every 10 years (or more often if you're at risk) Or, ask your provider about stool tests like a FIT test every year or Cologuard test every 3 years.  To learn more about your testing options, visit:   .  For help making a decision, visit:   https://bit.ly/dw22905.  Prostate  cancer screening test: If you have a prostate, ask your care team if a prostate cancer screening test (PSA) at age 55 is right for you.  Lung cancer screening: If you are a current or former smoker ages 50 to 80, ask your care team if ongoing lung cancer screenings are right for you.  For informational purposes only. Not to replace the advice of your health care provider. Copyright   2023 [facility]. All rights reserved. Clinically reviewed by the [facility]itions Program. [employer] [id number] - REV 01/24.

## 2024-09-20 NOTE — Clinical Note
Please call pt - let her know restarting OCPs. Stop propranolol. I don't think she was taking OCP continuously before but that is an option. I would just need to rewrite script. Takes 3 mo for body to reregulate so would stay on this until she and I talk again in 4 mo. EDUARDO

## 2024-09-20 NOTE — Clinical Note
Migraines still aren't great and I hesitate to go up on propranolol bc she is so tired. She understood that she couldn't go back on OCPs. I don't see a contraindication as long we we monitor her BP. My thought was restart OCPs, stop propranlol. Not a lot of change on higher doses of wegovy but really no diet changes. LMK your thoughts.

## 2024-09-23 DIAGNOSIS — I10 BENIGN ESSENTIAL HYPERTENSION: ICD-10-CM

## 2024-09-23 RX ORDER — POTASSIUM CHLORIDE 1500 MG/1
20 TABLET, EXTENDED RELEASE ORAL 2 TIMES DAILY
Qty: 90 TABLET | Refills: 3 | Status: SHIPPED | OUTPATIENT
Start: 2024-09-23 | End: 2024-09-23

## 2024-09-23 NOTE — TELEPHONE ENCOUNTER
Notified Patient  of provider's message.     Person was given an opportunity to ask questions, verbalized understanding of plan, and is agreeable.     Kaycee Sarmiento RN

## 2024-09-24 RX ORDER — POTASSIUM CHLORIDE 1500 MG/1
20 TABLET, EXTENDED RELEASE ORAL 2 TIMES DAILY
Qty: 180 TABLET | Refills: 1 | Status: SHIPPED | OUTPATIENT
Start: 2024-09-24

## 2024-09-25 LAB
BKR AP ASSOCIATED HPV REPORT: NORMAL
BKR LAB AP GYN ADEQUACY: NORMAL
BKR LAB AP GYN INTERPRETATION: NORMAL
BKR LAB AP PREVIOUS ABNORMAL: NORMAL
PATH REPORT.COMMENTS IMP SPEC: NORMAL
PATH REPORT.COMMENTS IMP SPEC: NORMAL
PATH REPORT.RELEVANT HX SPEC: NORMAL

## 2024-10-21 ASSESSMENT — SLEEP AND FATIGUE QUESTIONNAIRES
HOW LIKELY ARE YOU TO NOD OFF OR FALL ASLEEP WHILE SITTING INACTIVE IN A PUBLIC PLACE: WOULD NEVER DOZE
HOW LIKELY ARE YOU TO NOD OFF OR FALL ASLEEP WHILE SITTING QUIETLY AFTER LUNCH WITHOUT ALCOHOL: SLIGHT CHANCE OF DOZING
HOW LIKELY ARE YOU TO NOD OFF OR FALL ASLEEP WHILE SITTING AND READING: SLIGHT CHANCE OF DOZING
HOW LIKELY ARE YOU TO NOD OFF OR FALL ASLEEP WHILE LYING DOWN TO REST IN THE AFTERNOON WHEN CIRCUMSTANCES PERMIT: MODERATE CHANCE OF DOZING
HOW LIKELY ARE YOU TO NOD OFF OR FALL ASLEEP WHILE WATCHING TV: SLIGHT CHANCE OF DOZING
HOW LIKELY ARE YOU TO NOD OFF OR FALL ASLEEP WHEN YOU ARE A PASSENGER IN A CAR FOR AN HOUR WITHOUT A BREAK: SLIGHT CHANCE OF DOZING
HOW LIKELY ARE YOU TO NOD OFF OR FALL ASLEEP IN A CAR, WHILE STOPPED FOR A FEW MINUTES IN TRAFFIC: WOULD NEVER DOZE
HOW LIKELY ARE YOU TO NOD OFF OR FALL ASLEEP WHILE SITTING AND TALKING TO SOMEONE: WOULD NEVER DOZE

## 2024-10-22 ENCOUNTER — OFFICE VISIT (OUTPATIENT)
Dept: SLEEP MEDICINE | Facility: CLINIC | Age: 44
End: 2024-10-22
Attending: NURSE PRACTITIONER
Payer: COMMERCIAL

## 2024-10-22 DIAGNOSIS — R73.01 ELEVATED FASTING GLUCOSE: ICD-10-CM

## 2024-10-22 DIAGNOSIS — R40.0 DAYTIME SLEEPINESS: ICD-10-CM

## 2024-10-22 DIAGNOSIS — F41.9 ANXIETY: ICD-10-CM

## 2024-10-22 DIAGNOSIS — G43.829 MENSTRUAL MIGRAINE WITHOUT STATUS MIGRAINOSUS, NOT INTRACTABLE: ICD-10-CM

## 2024-10-22 DIAGNOSIS — I10 BENIGN ESSENTIAL HYPERTENSION: ICD-10-CM

## 2024-10-22 DIAGNOSIS — E66.01 MORBID OBESITY (H): ICD-10-CM

## 2024-10-22 PROCEDURE — G0399 HOME SLEEP TEST/TYPE 3 PORTA: HCPCS | Performed by: INTERNAL MEDICINE

## 2024-10-22 NOTE — NURSING NOTE
Pt is completing a home sleep test. Pt was instructed on how to put on the Noxturnal T3 device and associated equipment before going to bed and given the opportunity to practice putting it on before leaving the sleep center. Pt was reminded to bring the home sleep test kit back to the center tomorrow, at the scheduled time for download and reporting. Patient was instructed to complete study using the following treatment?  None.  Device number: 28  Cleopatra Coffey CMA on 10/22/2024 at 9:40 AM

## 2024-10-23 ENCOUNTER — DOCUMENTATION ONLY (OUTPATIENT)
Dept: SLEEP MEDICINE | Facility: CLINIC | Age: 44
End: 2024-10-23
Payer: COMMERCIAL

## 2024-10-23 NOTE — NURSING NOTE
Pt returned HST device. It was downloaded and forwarded data to the clinical specialist for scoring.   Cleopatra Coffey CMA on 10/23/2024 at 8:52 AM

## 2024-10-23 NOTE — PROGRESS NOTES
HST POST-STUDY QUESTIONNAIRE    What time did you go to bed?  10 pm  How long do you think it took to fall asleep?  1 hour +  What time did you wake up to start the day?  6:35 am  Did you get up during the night at all?  Yes  If you woke up, do you remember approximately what time(s)? 10:55 pm  Did you have any difficulty with the equipment?  No  Did you us any type of treatment with this study?  None  Was the head of the bed elevated? No  Did you sleep in a recliner?  No  Did you stop using CPAP at least 3 days before this test?  NA  Any other information you'd like us to know? Took a Advil pm's at 9:40 pm

## 2024-10-29 ENCOUNTER — MYC MEDICAL ADVICE (OUTPATIENT)
Dept: PEDIATRICS | Facility: CLINIC | Age: 44
End: 2024-10-29
Payer: COMMERCIAL

## 2024-11-01 NOTE — PROCEDURES
Home Sleep Study Interpretation    Patient: Dora Barrera  MRN: [id number]  YOB: 1980  Study Date: 10/22/2024  PCP/Referring Provider: Jeff Contreras;  Ordering Provider: NURYS Adamson    Indications for Home Study: Dora is a 44 Female who presents with symptoms suggestive of obstructive sleep apnea      Weight: 280.0 lbs  BMI: 51.2   Pierson:   STOP BAN/8      Data: A full night home sleep study was performed recording the standard physiologic parameters including body position, movement, sound, nasal pressure, thermal oral airflow, chest and abdominal movements with respiratory inductance plethysmography, and oxygen saturation by pulse oximetry. Pulse rate was estimated by oximetry recording. This study was considered adequate based on > 4 hours of quality oximetry and respiratory recording. As specified by the AASM Manual for the Scoring of Sleep and Associated events, version 2.3, Rule VIII.D 1B, 4% oxygen desaturation scoring for hypopneas is used as a standard of care on all home sleep apnea testing.    Analysis Time: 503.5 minutes    Respiration:  Sleep Associated Hypoxemia: Sustained hypoxemia was present. Baseline oxygen saturation was 92. Time with saturation less than 89% was 18.2 minutes. Time with saturation less than 90% was 44.4 minutes. The lowest oxygen saturation was 79.0%.  Snoring: Snoring was present 4% of the time with an average level of 67 dB. Duration of time snoring above 70 dB was 15.3 minutes.    Respiratory events: The home study revealed a presence of 4 obstructive apneas and 6 mixed and central apneas. There were 170 hypopneas resulting in a combined apnea/hypopnea index [AHI] of 21 events per hour with  29 per hour supine, 0  per hour prone, 0 per hour upright, 21  per hour left side, and 10 per hour right side.    Position: Percent of time spent: Supine 45%, prone 0%, upright 0%, on left 26%, on right 28%.    Heart Rate: By Pulse  Oximetry normal rate was noted.    Assessment:  moderate obstructive sleep apnea.  Sleep associated hypoxemia was present.    Recommendations:  Consider auto-CPAP at 5-15 cmH2O, oral appliance therapy, or positional therapy  Suggest optimizing sleep hygiene and avoiding sleep deprivation  Weight management    Diagnosis Code(s): Obstructive Sleep Apnea G47.33, Hypoxemia G47.36    Electronically signed by: Orville Hernandez DO November 1, 2024  Diplomate, American Board of Internal Medicine, Sleep Medicine

## 2024-11-01 NOTE — PROGRESS NOTES
This HSAT was performed using a Noxturnal T3 device which recorded snore, sound, movement activity, body position, nasal pressure, oronasal thermal airflow, pulse, oximetry and both chest and abdominal respiratory effort. HSAT data was restricted to the time patient states they were in bed.     HSAT was scored using 1B 4% hypopnea rule.     HST AHI (Non-PAT): 21.4  Snoring was reported as moderate.  Time with SpO2 below 89% was 18.2 minutes.   Overall signal quality was good     Pt will follow up with sleep provider to determine appropriate therapy.

## 2024-11-06 ENCOUNTER — OFFICE VISIT (OUTPATIENT)
Dept: ORTHOPEDICS | Facility: CLINIC | Age: 44
End: 2024-11-06
Payer: COMMERCIAL

## 2024-11-06 ENCOUNTER — ANCILLARY PROCEDURE (OUTPATIENT)
Dept: GENERAL RADIOLOGY | Facility: CLINIC | Age: 44
End: 2024-11-06
Attending: STUDENT IN AN ORGANIZED HEALTH CARE EDUCATION/TRAINING PROGRAM
Payer: COMMERCIAL

## 2024-11-06 DIAGNOSIS — M25.512 CHRONIC LEFT SHOULDER PAIN: ICD-10-CM

## 2024-11-06 DIAGNOSIS — G89.29 CHRONIC LEFT SHOULDER PAIN: ICD-10-CM

## 2024-11-06 DIAGNOSIS — M75.42 SUBACROMIAL IMPINGEMENT, LEFT: ICD-10-CM

## 2024-11-06 DIAGNOSIS — M75.82 ROTATOR CUFF TENDONITIS, LEFT: Primary | ICD-10-CM

## 2024-11-06 PROCEDURE — 99203 OFFICE O/P NEW LOW 30 MIN: CPT | Performed by: STUDENT IN AN ORGANIZED HEALTH CARE EDUCATION/TRAINING PROGRAM

## 2024-11-06 PROCEDURE — 73030 X-RAY EXAM OF SHOULDER: CPT | Mod: TC | Performed by: RADIOLOGY

## 2024-11-06 NOTE — PROGRESS NOTES
ASSESSMENT & PLAN    Dora was seen today for pain.    Diagnoses and all orders for this visit:    Rotator cuff tendonitis, left  -     XR Shoulder Left G/E 3 Views; Future  -     Physical Therapy  Referral; Future    Subacromial impingement, left  -     Physical Therapy  Referral; Future      This issue is acute and Improving. Dora presents our clinic today to discuss her acute left shoulder pain.  Radiographs taken in clinic today reviewed with the patient and are negative for any acute or chronic bony abnormalities.  She reports pain over the lateral aspect of the shoulder that occurs with most motions of the shoulder that will wax and wane in severity.  On examination, she has some mild pain with external rotation, but reassuringly has 5/5 strength and otherwise has full range of motion and strength of the rotator cuff without pain.  We discussed that her history is most consistent with intermittent subacromial impingement causing flareups of rotator cuff tendinitis and bursitis that seem to be improving with rest and activity modifications.  We discussed other treatment options for this including anti-inflammatory medicines, physical therapy, corticosteroid injections, and surgical intervention.  Given the patient's symptoms are overall mild today, I do not think she would get much benefit from a corticosteroid injection at this time.  We discussed utilizing physical therapy to help strengthen the rotator cuff muscles in the muscles of the shoulder blade and improve the mechanics of the shoulder to help minimize episodes of impingement.  We determined the following plan:  - Physical therapy referral placed  - She can otherwise use over-the-counter pain medications, ice, heat as needed  - She can follow-up in our clinic in 6 weeks if not improving, or sooner if unable to tolerate PT or her pain flares up.  At that time, would proceed with a CSI to the left subacromial bursa.    Donte  DO FLAVIA Chirinos [facility]    -----  Chief Complaint   Patient presents with    Left Shoulder - Pain       SUBJECTIVE  Dora Barrera is a/an 44 year old female who is seen in consultation at the request of  Jeff Contreras M.D. for evaluation of left shoulder pain.     The patient is seen by themselves.  The patient is Right handed    Onset: 3-4 month(s) ago. Reports insidious onset without acute precipitating event.  Location of Pain: left clavicle, lateral shoulder  Worsened by: reaching backwards, getting dressed, pressure while getting up  Better with: rest, heat, ibuprofen  Treatments tried: rest/activity avoidance, heat, and ibuprofen  Associated symptoms: no distal numbness or tingling; denies swelling or warmth    Orthopedic/Surgical history: NO  Social History/Occupation: Computer work      REVIEW OF SYSTEMS:  Review of systems negative unless mentioned in HPI     OBJECTIVE:  LMP 08/25/2024 (Exact Date)    General: healthy, alert and in no distress  Skin: no suspicious lesions or rash.  CV: distal perfusion intact   Resp: normal respiratory effort without conversational dyspnea   Psych: normal mood and affect  Gait: NORMAL  Neuro: Normal light sensory exam of left upper extremity     Shoulder Examination (focused):   Left  Right     Skin intact intact   Inspection No erythema, ecchymosis  No erythema, ecchymosis    Palpation Tenderness: None Tenderness: None   Range of Motion (active) Forward flexion:175   GH Abduction: 90  Reach behind back: T10 Forward flexion:175   GH Abduction: 90  Reach behind back: T10   Range of Motion (passive) Forward flexion:175   GH Abduction: 90  ER at side: 70  ER at 90 deg abduction: 90  IR at 90 deg abduction: 60 Forward flexion:175   GH Abduction: 90  ER at side: 70  ER at 90 deg abduction: 90  IR at 90 deg abduction: 60   Crepitus No No   Strength Internal Rotation at side: 5+  External Rotation at side: 5+  Belly  Press: 5+ Internal Rotation at side: 5+  External Rotation at side: 5+  Belly Press: 5+    Special Tests Moyer: NP  Neer: NP  Anamaria: 5+  Speed's: 5+  Cross arm: Negative  Moyer: NP  Neer: NP  Anamaria: 5+  Speed's: 5+  Cross arm: Negative      Other Positive Tests/ Notable Findings O'katherin's: NP  Bicep Load I/II: NP  Internal Impingement: NP  Yergason: NP   ERLS: NP  Hornblower: NP  Bear Hug: NP O'katherin's: NP  Bicep Load I/II: NP  Internal Impingement: NP  Yergason: NP   ERLS: NP  Hornblower: NP  Bear Hug: NP   Instability Generalized laxity: no  Anterior apprehension: Negative  Load and shift (anterior): NP  Load and shift (posterior): NP Generalized laxity: no  Anterior apprehension: Negative  Load and shift (anterior): NP  Load and shift (posterior): NP   Neurologic No abnormal sensation.   Scapular Motion Normal scapular alignment bilaterally without notable protraction/retraction, elevation/depression  No dynamic evidence of scapular dyskinesia           RADIOLOGY:  Final results and radiologist's interpretation, available in the [facility] health record.  Images were reviewed with the patient in the office today.  My personal interpretation of the performed imaging: Normal joint spacing and alignment of the glenohumeral joint.  No acute bony abnormalities.

## 2024-11-06 NOTE — LETTER
11/6/2024      Dora Barrera  [address] [place] [address]      Dear Colleague,    Thank you for referring your patient, Dora Barrera, to the [facility]INIC [facility]. Please see a copy of my visit note below.    ASSESSMENT & PLAN    Dora was seen today for pain.    Diagnoses and all orders for this visit:    Rotator cuff tendonitis, left  -     XR Shoulder Left G/E 3 Views; Future  -     Physical Therapy  Referral; Future    Subacromial impingement, left  -     Physical Therapy  Referral; Future      This issue is acute and Improving. Dora presents our clinic today to discuss her acute left shoulder pain.  Radiographs taken in clinic today reviewed with the patient and are negative for any acute or chronic bony abnormalities.  She reports pain over the lateral aspect of the shoulder that occurs with most motions of the shoulder that will wax and wane in severity.  On examination, she has some mild pain with external rotation, but reassuringly has 5/5 strength and otherwise has full range of motion and strength of the rotator cuff without pain.  We discussed that her history is most consistent with intermittent subacromial impingement causing flareups of rotator cuff tendinitis and bursitis that seem to be improving with rest and activity modifications.  We discussed other treatment options for this including anti-inflammatory medicines, physical therapy, corticosteroid injections, and surgical intervention.  Given the patient's symptoms are overall mild today, I do not think she would get much benefit from a corticosteroid injection at this time.  We discussed utilizing physical therapy to help strengthen the rotator cuff muscles in the muscles of the shoulder blade and improve the mechanics of the shoulder to help minimize episodes of impingement.  We determined the following plan:  - Physical therapy referral placed  - She can otherwise  use over-the-counter pain medications, ice, heat as needed  - She can follow-up in our clinic in 6 weeks if not improving, or sooner if unable to tolerate PT or her pain flares up.  At that time, would proceed with a CSI to the left subacromial bursa.    DO FLAVIA Barraza [facility]    -----  Chief Complaint   Patient presents with     Left Shoulder - Pain       SUBJECTIVE  Dora Barrera is a/an 44 year old female who is seen in consultation at the request of  Jeff Contreras M.D. for evaluation of left shoulder pain.     The patient is seen by themselves.  The patient is Right handed    Onset: 3-4 month(s) ago. Reports insidious onset without acute precipitating event.  Location of Pain: left clavicle, lateral shoulder  Worsened by: reaching backwards, getting dressed, pressure while getting up  Better with: rest, heat, ibuprofen  Treatments tried: rest/activity avoidance, heat, and ibuprofen  Associated symptoms: no distal numbness or tingling; denies swelling or warmth    Orthopedic/Surgical history: NO  Social History/Occupation: Computer work      REVIEW OF SYSTEMS:  Review of systems negative unless mentioned in HPI     OBJECTIVE:  LMP 08/25/2024 (Exact Date)    General: healthy, alert and in no distress  Skin: no suspicious lesions or rash.  CV: distal perfusion intact   Resp: normal respiratory effort without conversational dyspnea   Psych: normal mood and affect  Gait: NORMAL  Neuro: Normal light sensory exam of left upper extremity     Shoulder Examination (focused):   Left  Right     Skin intact intact   Inspection No erythema, ecchymosis  No erythema, ecchymosis    Palpation Tenderness: None Tenderness: None   Range of Motion (active) Forward flexion:175   GH Abduction: 90  Reach behind back: T10 Forward flexion:175   GH Abduction: 90  Reach behind back: T10   Range of Motion (passive) Forward flexion:175   GH Abduction: 90  ER at side:  70  ER at 90 deg abduction: 90  IR at 90 deg abduction: 60 Forward flexion:175   GH Abduction: 90  ER at side: 70  ER at 90 deg abduction: 90  IR at 90 deg abduction: 60   Crepitus No No   Strength Internal Rotation at side: 5+  External Rotation at side: 5+  Belly Press: 5+ Internal Rotation at side: 5+  External Rotation at side: 5+  Belly Press: 5+    Special Tests Moyer: NP  Neer: NP  Anamaria: 5+  Speed's: 5+  Cross arm: Negative  Moyer: NP  Neer: NP  Anamaria: 5+  Speed's: 5+  Cross arm: Negative      Other Positive Tests/ Notable Findings O'katherin's: NP  Bicep Load I/II: NP  Internal Impingement: NP  Yergason: NP   ERLS: NP  Hornblower: NP  Bear Hug: NP O'katherin's: NP  Bicep Load I/II: NP  Internal Impingement: NP  Yergason: NP   ERLS: NP  Hornblower: NP  Bear Hug: NP   Instability Generalized laxity: no  Anterior apprehension: Negative  Load and shift (anterior): NP  Load and shift (posterior): NP Generalized laxity: no  Anterior apprehension: Negative  Load and shift (anterior): NP  Load and shift (posterior): NP   Neurologic No abnormal sensation.   Scapular Motion Normal scapular alignment bilaterally without notable protraction/retraction, elevation/depression  No dynamic evidence of scapular dyskinesia           RADIOLOGY:  Final results and radiologist's interpretation, available in the [facility] health record.  Images were reviewed with the patient in the office today.  My personal interpretation of the performed imaging: Normal joint spacing and alignment of the glenohumeral joint.  No acute bony abnormalities.             Again, thank you for allowing me to participate in the care of your patient.        Sincerely,        Donte Chirinos DO

## 2024-11-07 ENCOUNTER — MYC MEDICAL ADVICE (OUTPATIENT)
Dept: PEDIATRICS | Facility: CLINIC | Age: 44
End: 2024-11-07
Payer: COMMERCIAL

## 2024-11-12 ENCOUNTER — ANCILLARY PROCEDURE (OUTPATIENT)
Dept: MAMMOGRAPHY | Facility: CLINIC | Age: 44
End: 2024-11-12
Attending: INTERNAL MEDICINE
Payer: COMMERCIAL

## 2024-11-12 DIAGNOSIS — Z12.31 ENCOUNTER FOR SCREENING MAMMOGRAM FOR BREAST CANCER: ICD-10-CM

## 2024-11-12 PROCEDURE — 77063 BREAST TOMOSYNTHESIS BI: CPT | Mod: TC | Performed by: RADIOLOGY

## 2024-11-12 PROCEDURE — 77067 SCR MAMMO BI INCL CAD: CPT | Mod: TC | Performed by: RADIOLOGY

## 2024-11-13 DIAGNOSIS — M89.9 LYTIC LESION OF BONE ON X-RAY: Primary | ICD-10-CM

## 2024-12-19 ENCOUNTER — HOSPITAL ENCOUNTER (OUTPATIENT)
Dept: MRI IMAGING | Facility: CLINIC | Age: 44
Discharge: HOME | End: 2024-12-19
Attending: STUDENT IN AN ORGANIZED HEALTH CARE EDUCATION/TRAINING PROGRAM
Payer: COMMERCIAL

## 2024-12-19 DIAGNOSIS — M89.9 LYTIC LESION OF BONE ON X-RAY: ICD-10-CM

## 2024-12-19 PROCEDURE — 73223 MRI JOINT UPR EXTR W/O&W/DYE: CPT | Mod: LT

## 2024-12-19 PROCEDURE — A9585 GADOBUTROL INJECTION: HCPCS | Performed by: STUDENT IN AN ORGANIZED HEALTH CARE EDUCATION/TRAINING PROGRAM

## 2024-12-19 PROCEDURE — 255N000002 HC RX 255 OP 636: Performed by: STUDENT IN AN ORGANIZED HEALTH CARE EDUCATION/TRAINING PROGRAM

## 2024-12-19 RX ORDER — GADOBUTROL 604.72 MG/ML
10 INJECTION INTRAVENOUS ONCE
Status: COMPLETED | OUTPATIENT
Start: 2024-12-19 | End: 2024-12-19

## 2024-12-19 RX ADMIN — GADOBUTROL 10 ML: 604.72 INJECTION INTRAVENOUS at 19:47

## 2025-01-06 ENCOUNTER — MYC REFILL (OUTPATIENT)
Dept: PHARMACY | Facility: CLINIC | Age: 45
End: 2025-01-06
Payer: COMMERCIAL

## 2025-01-06 DIAGNOSIS — E66.01 MORBID OBESITY (H): ICD-10-CM

## 2025-01-10 ENCOUNTER — TELEPHONE (OUTPATIENT)
Dept: PEDIATRICS | Facility: CLINIC | Age: 45
End: 2025-01-10

## 2025-01-10 PROBLEM — M75.42 SUBACROMIAL IMPINGEMENT, LEFT: Status: ACTIVE | Noted: 2025-01-10

## 2025-01-10 PROBLEM — M75.82 ROTATOR CUFF TENDONITIS, LEFT: Status: ACTIVE | Noted: 2025-01-10

## 2025-01-10 PROBLEM — M25.512 ACUTE PAIN OF LEFT SHOULDER: Status: ACTIVE | Noted: 2025-01-10

## 2025-01-10 NOTE — TELEPHONE ENCOUNTER
Prior Authorization Specialty Medication Request    Medication/Dose: Semaglutide-Weight Management (WEGOVY) 2.4 MG/0.75ML pen    Diagnosis and ICD code (if different than what is on RX):   Morbid obesity (H) [E66.01]        New/renewal/insurance change PA/secondary ins. PA: RENEWAL    Insurance   Primary: [employer] [place] [employer]  Insurance ID:  [id number]    Pharmacy Information (if different than what is on RX)  [facility]:  [facility]  Phone:  [phone]  Fax: [phone]    Clinic Information  Preferred routing pool for dept communication: [facility]

## 2025-01-14 NOTE — TELEPHONE ENCOUNTER
Retail Pharmacy Prior Authorization Team   Phone: [phone]    PA Initiation    Medication: WEGOVY 2.4 MG/0.75ML SC SOAJ  Insurance Company: [employer] - Phone [phone] Fax [phone]  Pharmacy Filling the Rx: [id number] - [place], [place] - [address]  AT [address]  Filling Pharmacy Phone: [phone]  Filling Pharmacy Fax:    Start Date: 1/14/2025    [id number]

## 2025-01-15 NOTE — TELEPHONE ENCOUNTER
Received a PA form to complete and fax back. Completed and faxed back with chart notes and weight measurements.

## 2025-01-28 NOTE — PROGRESS NOTES
Medication Therapy Management (MTM) Encounter    ASSESSMENT:                            Medication Adherence/Access: See below for considerations.    Hypertension and hypokalemia   Patient would benefit from stopping hydrochlorothiazide due to hypokalemia. Suggest change to CCB.    GERD and nausea  improved    Headache /Menstrual migraine   improved     Mental Health - anxiety  Discussed bupropion can have higher risk to cause worsen anxiety with dose adjustments, patient to monitor closely. Bupropion seemed to help improve her anxiety initially so reasonable to trial dose increase.     Weight Management   Weight from September (most recent weight on file) was weight gain vs loss from initial consult, therefore would suggest change to Zepbound.     PLAN:                            Stop lisinopril-hydrochlorothiazide and potassium.  Start benazepril-amlodipine once daily.  Blood pressure goal < 130/80 mm Hg.  Plan for lab and blood pressure check in clinic in 2 weeks, scheduled.    Change Wegovy to Zepbound 5 mg weekly for 4 weeks, then increase to 7.5 mg weekly.  - if insurance does not cover, first we will try an appeal to determine if the insurance just needs more information to get it covered. If we find out no coverage for weight loss meds, then would could discuss paying out of pocket or trial generic oral pills again.  - use barrier method when you start Zepbound or increase dose for first 4 weeks. Monitor changes in headache during this time too.    You may want to give bupropion longer duration when you try increasing. Having more anxiety initially is very common, if the anxiety persists and worsens beyond the 1-2 weeks of increase then drop back to 150mg dose.    Follow-up: Return in about 3 months (around 4/29/2025).    SUBJECTIVE/OBJECTIVE:                          Dora Barrera is a 44 year old female seen for a follow-up visit.       Reason for visit: medication follow-up .    Allergies/ADRs: Reviewed  "in chart  Past Medical History: Reviewed in chart  Tobacco: She reports that she has never smoked. She has been exposed to tobacco smoke. She has never used smokeless tobacco.  Alcohol: Less than 1 beverages / week    Medication Adherence/Access: The patient fills medications at [place]: NO, fills medications at [facility]. She is worried insurance may not cover weight loss medication.    Hypertension and hypokalemia  Lisinopril-hydrochlorothiazide 20-12.5 mg daily   Potassium 20 mEq twice daily - one a day only right now due to how large tablets are  Patient reports no current medication side effects besides unable to take both potassium. She reports history taking one blood pressure medication did not control her blood pressure.     GERD and nausea  Ondansetron ODT 4 mg every 8 hour as needed - not used  Omeprazole 20 mg in the morning + 20 mg daily as needed   Improved        Headache /Menstrual migraine   Norgestimate-ethinyl estradiol 0.25-35 mg-mcg 1 tablet daily    No longer has menstrual cycle. Headache/migraines significantly better controlled.     Mental Health - anxiety  Lexapro 20 mg daily  Bupropion  mg in the morning   May worsen with hormonal change. Her anxiety is worse with work/home stress. She is wondering if she should try going back up to 300mg, was only on few days.   Tried: fluoxetine - worsened mood + fatigue     Weight Management   Wegovy 2.4 mg weekly on Friday   She had gone without for few weeks due insurance. She felt the food noise was very strong. She reports not side effects now, was having nausea.   Diet is getting only slightly javier since the gap in therapy. Prior to that had been doing portion was a lot smaller and craving smaller fruits.  Has not been as active and fears ice and slipping.  Failed medications include:   - topiramate cause too much sedation and minimal benefit  - the phentermine helped with energy but when she ran out of the medication cause her to \"crash\" and it " "did help with her appetite suppression. Likely per chart up her 37.5 mg dose.   Initial weight at consult: 278 lb 4.8 oz (126.2 kg)     Current weight today: scale is out of battery at home. However, recently reports had lost 10lbs.  Wt Readings from Last 4 Encounters:   09/20/24 285 lb 11.2 oz (129.6 kg)   08/20/24 280 lb (127 kg)   06/27/23 273 lb 8 oz (124.1 kg)   01/16/23 272 lb 1.6 oz (123.4 kg)     Estimated body mass index is 51.42 kg/m  as calculated from the following:    Height as of 9/20/24: 5' 2.5\" (1.588 m).    Weight as of 9/20/24: 285 lb 11.2 oz (129.6 kg).      Today's Vitals: There were no vitals taken for this visit.  ----------------      I spent 34 minutes with this patient today. All changes were made via collaborative practice agreement with Jeff Contreras MD.     A summary of these recommendations was sent via [facility].    Oliva Maddox PharmD  Medication Therapy Management Pharmacist    Telemedicine Visit Details  The patient's medications can be safely assessed via a telemedicine encounter.  Type of service:  Telephone visit  Originating Location (pt. Location): Home    Distant Location (provider location):  Off-site  Start Time:  8:32 AM  End Time: 9:04 AM     Medication Therapy Recommendations  Anxiety   1 Current Medication: buPROPion (WELLBUTRIN XL) 150 MG 24 hr tablet   Current Medication Sig: Take 2 tablets (300 mg) by mouth every morning.   Rationale: Does not understand instructions - Adherence - Adherence   Recommendation: Provide Education   Status: Patient Agreed - Adherence/Education   Identified Date: 1/29/2025 Completed Date: 1/29/2025         Benign essential hypertension   1 Current Medication: lisinopril-hydrochlorothiazide (ZESTORETIC) 20-12.5 MG tablet (Discontinued)   Current Medication Sig: Take 1 tablet by mouth daily.   Rationale: Undesirable effect - Adverse medication event - Safety   Recommendation: Change Medication - amLODIPine-benazepril 5-20 MG " capsule   Status: Accepted per CPA   Identified Date: 1/29/2025 Completed Date: 1/29/2025         Morbid obesity (H)   1 Current Medication: Semaglutide-Weight Management (WEGOVY) 2.4 MG/0.75ML pen (Discontinued)   Current Medication Sig: Inject 2.4 mg subcutaneously once a week. Due for pharmacist visit   Rationale: Condition refractory to medication - Ineffective medication - Effectiveness   Recommendation: Change Medication - Zepbound 5 MG/0.5ML Soaj   Status: Accepted per CPA   Identified Date: 1/29/2025 Completed Date: 1/29/2025

## 2025-01-28 NOTE — TELEPHONE ENCOUNTER
Prior Authorization Approval    Medication: WEGOVY 2.4 MG/0.75ML SC SOAJ  Authorization Effective Date: 1/16/2025  Authorization Expiration Date: 1/16/2026  Approved Dose/Quantity:   Reference #:     Insurance Company: [employer] - Phone [phone] Fax [phone]  Expected CoPay: $    CoPay Card Available:      Financial Assistance Needed:   Which Pharmacy is filling the prescription: [id number] - [place], [place] - [address]  AT [address]  Pharmacy Notified: Yes  Patient Notified: per pharmacy, pt picked up on 1/17/25

## 2025-01-29 ENCOUNTER — VIRTUAL VISIT (OUTPATIENT)
Dept: PHARMACY | Facility: CLINIC | Age: 45
End: 2025-01-29
Payer: COMMERCIAL

## 2025-01-29 ENCOUNTER — MYC MEDICAL ADVICE (OUTPATIENT)
Dept: PEDIATRICS | Facility: CLINIC | Age: 45
End: 2025-01-29

## 2025-01-29 DIAGNOSIS — K21.00 GASTROESOPHAGEAL REFLUX DISEASE WITH ESOPHAGITIS, UNSPECIFIED WHETHER HEMORRHAGE: ICD-10-CM

## 2025-01-29 DIAGNOSIS — E66.01 MORBID OBESITY (H): ICD-10-CM

## 2025-01-29 DIAGNOSIS — F41.9 ANXIETY: ICD-10-CM

## 2025-01-29 DIAGNOSIS — R11.0 NAUSEA: ICD-10-CM

## 2025-01-29 DIAGNOSIS — G43.829 MENSTRUAL MIGRAINE WITHOUT STATUS MIGRAINOSUS, NOT INTRACTABLE: ICD-10-CM

## 2025-01-29 DIAGNOSIS — E87.6 HYPOKALEMIA: ICD-10-CM

## 2025-01-29 DIAGNOSIS — I10 BENIGN ESSENTIAL HYPERTENSION: Primary | ICD-10-CM

## 2025-01-29 RX ORDER — AMLODIPINE AND BENAZEPRIL HYDROCHLORIDE 5; 20 MG/1; MG/1
1 CAPSULE ORAL DAILY
Qty: 90 CAPSULE | Refills: 0 | Status: SHIPPED | OUTPATIENT
Start: 2025-01-29

## 2025-01-29 NOTE — PATIENT INSTRUCTIONS
"Recommendation(s) from today's visit:                                                      Stop lisinopril-hydrochlorothiazide and potassium.  Start benazepril-amlodipine once daily.  Blood pressure goal < 130/80 mm Hg.  Plan for lab and blood pressure check in clinic in 2 weeks, scheduled.    Change Wegovy to Zepbound 5 mg weekly for 4 weeks, then increase to 7.5 mg weekly.  - if insurance does not cover, first we will try an appeal to determine if the insurance just needs more information to get it covered. If we find out no coverage for weight loss meds, then would could discuss paying out of pocket or trial generic oral pills again.  - use barrier method when you start Zepbound or increase dose for first 4 weeks. Monitor changes in headache during this time too.    You may want to give bupropion longer duration when you try increasing. Having more anxiety initially is very common, if the anxiety persists and worsens beyond the 1-2 weeks of increase then drop back to 150mg dose.    Follow-up: 3 months or sooner if needed    My Clinical Pharmacist's contact information:                                                      Oliva Maddox, PharmD  Medication therapy management clinical pharmacist    It was great speaking with you today.  I value your experience and would be very thankful for your time in providing feedback in our clinic survey. In the next few days, you may receive an email or text message from [employer] with a link to a survey related to your  clinical pharmacist.\"     To schedule another MTM appointment, please call the clinic directly [phone] or you may call the MTM scheduling line at [phone].    "

## 2025-02-03 ENCOUNTER — TELEPHONE (OUTPATIENT)
Dept: PEDIATRICS | Facility: CLINIC | Age: 45
End: 2025-02-03
Payer: COMMERCIAL

## 2025-02-03 NOTE — TELEPHONE ENCOUNTER
Prior Authorization Retail Medication Request    Medication/Dose: tirzepatide-Weight Management (ZEPBOUND) 5 MG/0.5ML prefilled pen  Diagnosis and ICD code (if different than what is on RX):  Morbid obesity (H) [E66.01]   New/renewal/insurance change PA/secondary ins. PA:  Previously Tried and Failed:    Rationale:      Insurance   Primary: [employer] [place] [employer]   Insurance ID:  [id number]    Secondary (if applicable):  Insurance ID:      Pharmacy Information (if different than what is on RX)  Name[facility]  [facility]  Phone:  [phone]  Fax:[phone]    Inocencia SHELBY CMA

## 2025-02-07 NOTE — TELEPHONE ENCOUNTER
Retail Pharmacy Prior Authorization Team   Phone: [phone]    PA Initiation    Medication: ZEPBOUND 5 MG/0.5ML SC SOAJ  Insurance Company: [email or web address] - Phone [phone] Fax [phone]  Pharmacy Filling the Rx: [id number] - [place], [place] - [address] AT [facility]  Filling Pharmacy Phone: [phone]  Filling Pharmacy Fax: [phone]  Start Date: 2/7/2025

## 2025-02-11 NOTE — TELEPHONE ENCOUNTER
Prior Authorization Approval    Medication: ZEPBOUND 5 MG/0.5ML SC SOAJ  Authorization Effective Date: 2/7/2025  Authorization Expiration Date: 10/7/2025  Approved Dose/Quantity:   Reference #:     Insurance Company: [employer] [phone] Fax [phone]  Expected CoPay: $    CoPay Card Available:      Financial Assistance Needed:   Which Pharmacy is filling the prescription: [id number] - [place], [place] - [address] AT [facility]  Pharmacy Notified: Yes  Patient Notified:

## 2025-02-17 ENCOUNTER — ALLIED HEALTH/NURSE VISIT (OUTPATIENT)
Dept: PEDIATRICS | Facility: CLINIC | Age: 45
End: 2025-02-17
Payer: COMMERCIAL

## 2025-02-17 VITALS — HEART RATE: 88 BPM | SYSTOLIC BLOOD PRESSURE: 132 MMHG | DIASTOLIC BLOOD PRESSURE: 84 MMHG

## 2025-02-17 DIAGNOSIS — I10 BENIGN ESSENTIAL HYPERTENSION: ICD-10-CM

## 2025-02-17 DIAGNOSIS — Z01.30 BP CHECK: Primary | ICD-10-CM

## 2025-02-17 LAB
ANION GAP SERPL CALCULATED.3IONS-SCNC: 11 MMOL/L (ref 7–15)
BUN SERPL-MCNC: 15.2 MG/DL (ref 6–20)
CALCIUM SERPL-MCNC: 8.3 MG/DL (ref 8.8–10.4)
CHLORIDE SERPL-SCNC: 106 MMOL/L (ref 98–107)
CREAT SERPL-MCNC: 0.63 MG/DL (ref 0.51–0.95)
EGFRCR SERPLBLD CKD-EPI 2021: >90 ML/MIN/1.73M2
GLUCOSE SERPL-MCNC: 101 MG/DL (ref 70–99)
HCO3 SERPL-SCNC: 22 MMOL/L (ref 22–29)
POTASSIUM SERPL-SCNC: 3.6 MMOL/L (ref 3.4–5.3)
SODIUM SERPL-SCNC: 139 MMOL/L (ref 135–145)

## 2025-02-17 PROCEDURE — 99207 PR NO CHARGE NURSE ONLY: CPT

## 2025-02-17 PROCEDURE — 36415 COLL VENOUS BLD VENIPUNCTURE: CPT

## 2025-02-17 PROCEDURE — 80048 BASIC METABOLIC PNL TOTAL CA: CPT

## 2025-02-17 NOTE — PROGRESS NOTES
I met with Dora Barrera at the request of Dr Daya Damian to recheck her blood pressure.  Blood pressure medications on the med list were reviewed with patient.    Patient has taken all medications as per usual regimen: Yes  Patient reports tolerating them without any issues or concerns: Yes    Vitals:    02/17/25 0944 02/17/25 0946   BP: 130/84 132/84   BP Location: Right arm Right arm   Patient Position: Sitting Sitting   Cuff Size: Adult Large Adult Large   Pulse: 88 88       Blood pressure was taken, previous encounter was reviewed, recorded blood pressure below 140/90.  Patient was discharged and the note will be sent to the provider for final review.

## 2025-02-18 PROBLEM — M25.512 ACUTE PAIN OF LEFT SHOULDER: Status: RESOLVED | Noted: 2025-01-10 | Resolved: 2025-02-18

## 2025-02-18 PROBLEM — M75.82 ROTATOR CUFF TENDONITIS, LEFT: Status: RESOLVED | Noted: 2025-01-10 | Resolved: 2025-02-18

## 2025-02-18 PROBLEM — M75.42 SUBACROMIAL IMPINGEMENT, LEFT: Status: RESOLVED | Noted: 2025-01-10 | Resolved: 2025-02-18

## 2025-02-18 NOTE — PROGRESS NOTES
BP Readings from Last 6 Encounters:   02/17/25 132/84   09/20/24 114/78   06/27/23 122/84   01/16/23 122/86   08/04/22 130/88   03/30/22 118/72     Has she checked any BPs at home? Goal <130/80 and close in clinic. If look okay at home will continue current meds.     ---    ADDENDUM - do not need to call - MTM gave directions in result note. Closing encounter.

## 2025-02-18 NOTE — PROGRESS NOTES
"Per chart review  Lab Result Note 2/17/25 with Oliva Tan MT  \"Your potassium and kidney function are stable. The calcium is on the low end but sometime can be falsely low. Your blood pressure is nearly to goal. I would like you to have your blood pressure checked in a month again you can either schedule another nurse only or schedule a blood pressure check at the [facility] on the first floor. We can recheck an ionized calcium level in the future when we get labs again.\"    Heather Lopes RN    "

## 2025-03-04 ENCOUNTER — OFFICE VISIT (OUTPATIENT)
Dept: URGENT CARE | Facility: URGENT CARE | Age: 45
End: 2025-03-04
Payer: COMMERCIAL

## 2025-03-04 VITALS
HEART RATE: 114 BPM | OXYGEN SATURATION: 96 % | SYSTOLIC BLOOD PRESSURE: 134 MMHG | TEMPERATURE: 99.6 F | RESPIRATION RATE: 18 BRPM | WEIGHT: 271 LBS | DIASTOLIC BLOOD PRESSURE: 90 MMHG | BODY MASS INDEX: 48.78 KG/M2

## 2025-03-04 DIAGNOSIS — G44.209 TENSION-TYPE HEADACHE, NOT INTRACTABLE, UNSPECIFIED CHRONICITY PATTERN: ICD-10-CM

## 2025-03-04 DIAGNOSIS — R19.7 DIARRHEA, UNSPECIFIED TYPE: ICD-10-CM

## 2025-03-04 DIAGNOSIS — N93.9 VAGINAL BLEEDING: Primary | ICD-10-CM

## 2025-03-04 DIAGNOSIS — R11.2 NAUSEA AND VOMITING, UNSPECIFIED VOMITING TYPE: ICD-10-CM

## 2025-03-04 LAB
ALBUMIN SERPL-MCNC: 3.3 G/DL (ref 3.4–5)
ALP SERPL-CCNC: 50 U/L (ref 40–150)
ALT SERPL W P-5'-P-CCNC: 30 U/L (ref 0–50)
ANION GAP SERPL CALCULATED.3IONS-SCNC: 5 MMOL/L (ref 3–14)
AST SERPL W P-5'-P-CCNC: 34 U/L (ref 0–45)
BASOPHILS # BLD AUTO: 0 10E3/UL (ref 0–0.2)
BASOPHILS NFR BLD AUTO: 0 %
BILIRUB SERPL-MCNC: 0.7 MG/DL (ref 0.2–1.3)
BUN SERPL-MCNC: 15 MG/DL (ref 7–30)
CALCIUM SERPL-MCNC: 8.5 MG/DL (ref 8.5–10.1)
CHLORIDE BLD-SCNC: 108 MMOL/L (ref 94–109)
CO2 SERPL-SCNC: 26 MMOL/L (ref 20–32)
CREAT SERPL-MCNC: 0.7 MG/DL (ref 0.52–1.04)
EGFRCR SERPLBLD CKD-EPI 2021: >90 ML/MIN/1.73M2
EOSINOPHIL # BLD AUTO: 0 10E3/UL (ref 0–0.7)
EOSINOPHIL NFR BLD AUTO: 0 %
ERYTHROCYTE [DISTWIDTH] IN BLOOD BY AUTOMATED COUNT: 13.6 % (ref 10–15)
GLUCOSE BLD-MCNC: 115 MG/DL (ref 70–99)
HCT VFR BLD AUTO: 40.4 % (ref 35–47)
HGB BLD-MCNC: 13.5 G/DL (ref 11.7–15.7)
IMM GRANULOCYTES # BLD: 0 10E3/UL
IMM GRANULOCYTES NFR BLD: 0 %
LYMPHOCYTES # BLD AUTO: 1.1 10E3/UL (ref 0.8–5.3)
LYMPHOCYTES NFR BLD AUTO: 10 %
MCH RBC QN AUTO: 29.4 PG (ref 26.5–33)
MCHC RBC AUTO-ENTMCNC: 33.4 G/DL (ref 31.5–36.5)
MCV RBC AUTO: 88 FL (ref 78–100)
MONOCYTES # BLD AUTO: 0.6 10E3/UL (ref 0–1.3)
MONOCYTES NFR BLD AUTO: 5 %
NEUTROPHILS # BLD AUTO: 9.4 10E3/UL (ref 1.6–8.3)
NEUTROPHILS NFR BLD AUTO: 84 %
PLATELET # BLD AUTO: 433 10E3/UL (ref 150–450)
POTASSIUM BLD-SCNC: 3.6 MMOL/L (ref 3.4–5.3)
PROT SERPL-MCNC: 7.1 G/DL (ref 6.8–8.8)
RBC # BLD AUTO: 4.59 10E6/UL (ref 3.8–5.2)
SODIUM SERPL-SCNC: 139 MMOL/L (ref 135–145)
WBC # BLD AUTO: 11.2 10E3/UL (ref 4–11)

## 2025-03-04 PROCEDURE — 85025 COMPLETE CBC W/AUTO DIFF WBC: CPT | Performed by: PHYSICIAN ASSISTANT

## 2025-03-04 PROCEDURE — 36415 COLL VENOUS BLD VENIPUNCTURE: CPT | Performed by: PHYSICIAN ASSISTANT

## 2025-03-04 PROCEDURE — 80053 COMPREHEN METABOLIC PANEL: CPT | Performed by: PHYSICIAN ASSISTANT

## 2025-03-04 PROCEDURE — 99214 OFFICE O/P EST MOD 30 MIN: CPT | Performed by: PHYSICIAN ASSISTANT

## 2025-03-04 RX ORDER — ONDANSETRON 4 MG/1
4 TABLET, ORALLY DISINTEGRATING ORAL EVERY 8 HOURS PRN
Qty: 15 TABLET | Refills: 0 | Status: SHIPPED | OUTPATIENT
Start: 2025-03-04

## 2025-03-04 RX ORDER — BUTALBITAL, ACETAMINOPHEN AND CAFFEINE 50; 325; 40 MG/1; MG/1; MG/1
1 TABLET ORAL EVERY 6 HOURS PRN
Qty: 12 TABLET | Refills: 0 | Status: SHIPPED | OUTPATIENT
Start: 2025-03-04

## 2025-03-04 RX ORDER — LOPERAMIDE HYDROCHLORIDE 2 MG/1
2 TABLET ORAL 3 TIMES DAILY PRN
Qty: 21 TABLET | Refills: 0 | Status: SHIPPED | OUTPATIENT
Start: 2025-03-04

## 2025-03-04 NOTE — PROGRESS NOTES
Assessment & Plan     Nausea and vomiting, unspecified vomiting type    The 'BRAT' diet is suggested, then progress to diet as tolerated as symptoms christine. Call if bloody stools, persistent diarrhea, vomiting, fever or abdominal pain.    When you are nauseated, you may feel weak and sweaty and notice a lot of saliva in your mouth. Nausea often leads to vomiting. Most of the time you do not need to worry about nausea and vomiting, but they can be signs of other illnesses.  Two common causes of nausea and vomiting are a stomach infection and food poisoning. Nausea and vomiting from a viral stomach infection will usually start to improve within 24 hours. Nausea and vomiting from food poisoning may last from 12 to 48 hours.    CBC is mildly elevated  CMP is normal renal function    Zofran for nausea and vomiting  - CBC with platelets and differential  - Comprehensive metabolic panel (BMP + Alb, Alk Phos, ALT, AST, Total. Bili, TP)      Diarrhea, unspecified type    Diarrhea is loose, watery stools (bowel movements). The exact cause is often hard to find. Sometimes diarrhea is your body's way of getting rid of what caused an upset stomach. Viruses, food poisoning, and many medicines can cause diarrhea. Some people get diarrhea in response to emotional stress, anxiety, or certain foods.  Almost everyone has diarrhea now and then. It usually isn't serious, and your stools will return to normal soon. The important thing to do is replace the fluids you have lost, so you can prevent dehydration.    immodium  - CBC with platelets and differential  - CBC with platelets and differential    Vaginal bleeding    Advised to follow up with GYN for ongoing bleeding as she is already on birth control       Tension Type Headaches    Trial course of fioricet  Follow up gyn to control periods as her periods are making her headaches worse        At today's visit with Dora Barrera , we discussed results, diagnosis, medications and  formulated a plan.  We also discussed red flags for immediate return to clinic/ER, as well as indications for follow up with PCP if not improved in 3 days. Patient understood and agreed to plan. Dora Barrera was discharged with stable vitals and has no further questions.       No follow-ups on file.    Fede John, [facility], PA-TONY  [facility] [place] John is a 44 year old female who presents to clinic today for the following health issues:  Chief Complaint   Patient presents with    Urgent Care     Headache lat Thursday, she had her period for 3 weeks now and its not stopping, yesterday to today diarrhea, vomiting, achy body for 2 days          3/4/2025    10:53 AM   Additional Questions   Roomed by Beata Lorenzo   Accompanied by self         3/4/2025   Forms   Any forms needing to be completed Yes     HPI  Review of Systems  Constitutional, HEENT, cardiovascular, pulmonary, GI, , musculoskeletal, neuro, skin, endocrine and psych systems are negative, except as otherwise noted.      Objective    /90 (BP Location: Left arm, Patient Position: Sitting, Cuff Size: Adult Regular)   Pulse 114   Temp 99.6  F (37.6  C) (Tympanic)   Resp 18   Wt 122.9 kg (271 lb)   LMP 02/10/2025   SpO2 96%   BMI 48.78 kg/m    Physical Exam   GENERAL: alert and no distress  EYES: Eyes grossly normal to inspection, PERRL and conjunctivae and sclerae normal  HENT: ear canals and TM's normal, nose and mouth without ulcers or lesions  NECK: no adenopathy, no asymmetry, masses, or scars  RESP: lungs clear to auscultation - no rales, rhonchi or wheezes  CV: regular rate and rhythm, normal S1 S2, no S3 or S4, no murmur, click or rub, no peripheral edema  ABDOMEN: soft, nontender, no hepatosplenomegaly, no masses and bowel sounds normal  MS: no gross musculoskeletal defects noted, no edema  SKIN: no suspicious lesions or rashes  NEURO: Normal strength and tone, mentation intact and speech  normal  PSYCH: mentation appears normal, affect normal/bright      Results for orders placed or performed in visit on 03/04/25   Comprehensive metabolic panel (BMP + Alb, Alk Phos, ALT, AST, Total. Bili, TP)     Status: Abnormal   Result Value Ref Range    Sodium 139 135 - 145 mmol/L    Potassium 3.6 3.4 - 5.3 mmol/L    Chloride 108 94 - 109 mmol/L    Carbon Dioxide (CO2) 26 20 - 32 mmol/L    Anion Gap 5 3 - 14 mmol/L    Urea Nitrogen 15 7 - 30 mg/dL    Creatinine 0.70 0.52 - 1.04 mg/dL    GFR Estimate >90 >60 mL/min/1.73m2    Calcium 8.5 8.5 - 10.1 mg/dL    Glucose 115 (H) 70 - 99 mg/dL    Alkaline Phosphatase 50 40 - 150 U/L    AST 34 0 - 45 U/L    ALT 30 0 - 50 U/L    Protein Total 7.1 6.8 - 8.8 g/dL    Albumin 3.3 (L) 3.4 - 5.0 g/dL    Bilirubin Total 0.7 0.2 - 1.3 mg/dL   CBC with platelets and differential     Status: Abnormal   Result Value Ref Range    WBC Count 11.2 (H) 4.0 - 11.0 10e3/uL    RBC Count 4.59 3.80 - 5.20 10e6/uL    Hemoglobin 13.5 11.7 - 15.7 g/dL    Hematocrit 40.4 35.0 - 47.0 %    MCV 88 78 - 100 fL    MCH 29.4 26.5 - 33.0 pg    MCHC 33.4 31.5 - 36.5 g/dL    RDW 13.6 10.0 - 15.0 %    Platelet Count 433 150 - 450 10e3/uL    % Neutrophils 84 %    % Lymphocytes 10 %    % Monocytes 5 %    % Eosinophils 0 %    % Basophils 0 %    % Immature Granulocytes 0 %    Absolute Neutrophils 9.4 (H) 1.6 - 8.3 10e3/uL    Absolute Lymphocytes 1.1 0.8 - 5.3 10e3/uL    Absolute Monocytes 0.6 0.0 - 1.3 10e3/uL    Absolute Eosinophils 0.0 0.0 - 0.7 10e3/uL    Absolute Basophils 0.0 0.0 - 0.2 10e3/uL    Absolute Immature Granulocytes 0.0 <=0.4 10e3/uL   CBC with platelets and differential     Status: Abnormal    Narrative    The following orders were created for panel order CBC with platelets and differential.  Procedure                               Abnormality         Status                     ---------                               -----------         ------                     CBC with platelets and  d...[[id number]]  Abnormal            Final result                 Please view results for these tests on the individual orders.

## 2025-03-23 DIAGNOSIS — R11.2 NAUSEA AND VOMITING, UNSPECIFIED VOMITING TYPE: ICD-10-CM

## 2025-03-24 RX ORDER — ONDANSETRON 4 MG/1
4 TABLET, ORALLY DISINTEGRATING ORAL EVERY 8 HOURS PRN
Qty: 15 TABLET | Refills: 0 | Status: SHIPPED | OUTPATIENT
Start: 2025-03-24

## 2025-04-06 ENCOUNTER — MYC MEDICAL ADVICE (OUTPATIENT)
Dept: PEDIATRICS | Facility: CLINIC | Age: 45
End: 2025-04-06
Payer: COMMERCIAL

## 2025-04-07 DIAGNOSIS — G44.209 TENSION-TYPE HEADACHE, NOT INTRACTABLE, UNSPECIFIED CHRONICITY PATTERN: ICD-10-CM

## 2025-04-07 RX ORDER — BUTALBITAL, ACETAMINOPHEN AND CAFFEINE 50; 325; 40 MG/1; MG/1; MG/1
1 TABLET ORAL EVERY 6 HOURS PRN
Qty: 12 TABLET | Refills: 0 | Status: SHIPPED | OUTPATIENT
Start: 2025-04-07

## 2025-04-29 DIAGNOSIS — I10 BENIGN ESSENTIAL HYPERTENSION: ICD-10-CM

## 2025-04-30 RX ORDER — AMLODIPINE AND BENAZEPRIL HYDROCHLORIDE 5; 20 MG/1; MG/1
1 CAPSULE ORAL DAILY
Qty: 90 CAPSULE | Refills: 1 | Status: SHIPPED | OUTPATIENT
Start: 2025-04-30

## 2025-04-30 NOTE — TELEPHONE ENCOUNTER
BP Readings from Last 6 Encounters:   03/04/25 134/90   02/17/25 132/84   09/20/24 114/78   06/27/23 122/84   01/16/23 122/86   08/04/22 130/88

## 2025-05-12 DIAGNOSIS — G43.829 MENSTRUAL MIGRAINE WITHOUT STATUS MIGRAINOSUS, NOT INTRACTABLE: ICD-10-CM

## 2025-05-20 ENCOUNTER — RESULTS FOLLOW-UP (OUTPATIENT)
Dept: SLEEP MEDICINE | Facility: CLINIC | Age: 45
End: 2025-05-20

## 2025-05-20 ENCOUNTER — MYC MEDICAL ADVICE (OUTPATIENT)
Dept: SLEEP MEDICINE | Facility: CLINIC | Age: 45
End: 2025-05-20
Payer: COMMERCIAL

## 2025-05-20 DIAGNOSIS — I10 BENIGN ESSENTIAL HYPERTENSION: ICD-10-CM

## 2025-05-20 DIAGNOSIS — G47.33 OSA (OBSTRUCTIVE SLEEP APNEA): Primary | ICD-10-CM

## 2025-05-20 DIAGNOSIS — K21.00 GASTROESOPHAGEAL REFLUX DISEASE WITH ESOPHAGITIS WITHOUT HEMORRHAGE: ICD-10-CM

## 2025-05-20 DIAGNOSIS — R73.03 PREDIABETES: ICD-10-CM

## 2025-05-20 DIAGNOSIS — E66.01 MORBID OBESITY (H): ICD-10-CM

## 2025-05-26 DIAGNOSIS — F41.9 ANXIETY: ICD-10-CM

## 2025-05-28 RX ORDER — HYDROXYZINE HYDROCHLORIDE 10 MG/1
10 TABLET, FILM COATED ORAL DAILY PRN
Qty: 30 TABLET | Refills: 0 | Status: SHIPPED | OUTPATIENT
Start: 2025-05-28

## 2025-06-02 ENCOUNTER — MYC MEDICAL ADVICE (OUTPATIENT)
Dept: PHARMACY | Facility: CLINIC | Age: 45
End: 2025-06-02
Payer: COMMERCIAL

## 2025-06-02 DIAGNOSIS — G44.209 TENSION-TYPE HEADACHE, NOT INTRACTABLE, UNSPECIFIED CHRONICITY PATTERN: ICD-10-CM

## 2025-06-04 RX ORDER — BUTALBITAL, ACETAMINOPHEN AND CAFFEINE 50; 325; 40 MG/1; MG/1; MG/1
1 TABLET ORAL EVERY 6 HOURS PRN
Qty: 12 TABLET | Refills: 0 | Status: SHIPPED | OUTPATIENT
Start: 2025-06-04

## 2025-06-17 ENCOUNTER — DOCUMENTATION ONLY (OUTPATIENT)
Dept: SLEEP MEDICINE | Facility: CLINIC | Age: 45
End: 2025-06-17
Payer: COMMERCIAL

## 2025-06-17 DIAGNOSIS — G47.33 OSA (OBSTRUCTIVE SLEEP APNEA): Primary | ICD-10-CM

## 2025-06-17 NOTE — PROGRESS NOTES
Patient was offered choice of vendor and chose [facility].  Patient Dora Mcguired was set up at [place]  on June 17, 2025. Patient received a Resmed Airsense 11 Pressures were set at 6-16 cm H2O.   Patient s ramp is 5 cm H2O for Auto and FLEX/EPR is EPR, 2.  Patient received a Resmed Mask name: Airfit [id number]  Nasal mask size Small frame, sm-wide cushion, heated tubing and heated humidifier.  Patient has the following compliance requirements: none  Patient has a follow up ot required with Bibi Castro CNP.    Tracy L Fahrenkamp

## 2025-06-20 ENCOUNTER — VIRTUAL VISIT (OUTPATIENT)
Dept: PHARMACY | Facility: CLINIC | Age: 45
End: 2025-06-20
Payer: COMMERCIAL

## 2025-06-20 DIAGNOSIS — E66.01 MORBID OBESITY (H): Primary | ICD-10-CM

## 2025-06-20 DIAGNOSIS — F41.9 ANXIETY: ICD-10-CM

## 2025-06-20 DIAGNOSIS — G43.829 MENSTRUAL MIGRAINE WITHOUT STATUS MIGRAINOSUS, NOT INTRACTABLE: ICD-10-CM

## 2025-06-20 PROCEDURE — 99207 PR NO CHARGE LOS: CPT | Mod: 95 | Performed by: PHARMACIST

## 2025-06-20 RX ORDER — SUMATRIPTAN SUCCINATE 100 MG/1
100 TABLET ORAL
Qty: 12 TABLET | Refills: 1 | Status: SHIPPED | OUTPATIENT
Start: 2025-06-20

## 2025-06-20 NOTE — PROGRESS NOTES
Medication Therapy Management (MTM) Encounter    ASSESSMENT:                            Medication Adherence/Access: No issues identified.    Headache   /Menstrual migraine   Recommend trial of sumatriptan to replace butalbital use for safety - minimize tolerance, dependence with prolonged use     Mental Health - anxiety  stable     Weight Management   She would benefit from resumption of Wegovy.  PLAN:                            Start Imitrex 100 mg at onset of migraine, ok to repeat 2 hours later if needed. Max 200 mg/24 hour period. Take with naproxen 1 tablet.  - Use butalbital as second line or if above not effective.     Once migraine better control restart Wegovy 0.25mg subcutaneous weekly 2 weeks, then increase to 0.5 mg weekly for 6 weeks, then increase to 1 mg weekly.    Follow-up: Return in about 3 months (around 9/30/2025) for Medication Therapy Telephone Visit.    SUBJECTIVE/OBJECTIVE:                          Dora Barrera is a 45 year old female seen for a follow-up visit.       Reason for visit: follow-up on migraine and weight management.    Allergies/ADRs: Reviewed in chart  Past Medical History: Reviewed in chart  Tobacco: She reports that she has never smoked. She has been exposed to tobacco smoke. She has never used smokeless tobacco.  Alcohol: not currently using    Medication Adherence/Access: no issues reported.    Headache   /Menstrual migraine   Norethindrone-ethinyl estradiol 0.5-35 mg-mcg 1 tablet daily   Butalbital-acetaminophen-caffeine 1 tablet every 6 hour as needed - needs to take 2 tabs    She mentions finally had a good week. Pain is in her temples and forehead. She mentions one lasted for 2 weeks. Denies any aura. She mentions her migraines were affecting her work.  had prescribed the butabital-acetaminophen- caffeine tablet initially. Previously taking over-the-counter Excedrin and was ineffective.  No bleed or spotting. She had ongoing bleeding after starting Zepbound.  "Therefore stopped.     Mental Health - anxiety  Lexapro 20 mg daily  Bupropion  mg daily   Hydroxyzine 10 mg daily as needed - needs once a week and tends to help    Previously tried: fluoxetine - worsened mood + fatigue  Patient feels better on lower bupropion dose. She finds current regimen effective. Ongoing work stress.   No side effects.     Weight Management   Currently on none. Take off Zepbound due to interaction with her hormone therapy with increased bleeding and migraines. She felt Wegovy 2.4 mg did a better job with food noise.  She would like to restart pharmacotherapy due to concerns of weight gain and risk of diabetes.  Failed medications include:   - topiramate cause too much sedation and minimal benefit  - the phentermine helped with energy but when she ran out of the medication cause her to \"crash\" and it did help with her appetite suppression. Likely per chart up her 37.5 mg dose.     Initial weight at consult: 278 lb 4.8 oz (126.2 kg)     Wt Readings from Last 4 Encounters:   03/04/25 271 lb (122.9 kg)   09/20/24 285 lb 11.2 oz (129.6 kg)   08/20/24 280 lb (127 kg)   06/27/23 273 lb 8 oz (124.1 kg)     Estimated body mass index is 48.78 kg/m  as calculated from the following:    Height as of 9/20/24: 5' 2.5\" (1.588 m).    Weight as of 3/4/25: 271 lb (122.9 kg).      Today's Vitals: There were no vitals taken for this visit.  ----------------      I spent 24 minutes with this patient today. All changes were made via collaborative practice agreement with Jeff Contreras MD.     A summary of these recommendations was sent via [employer].    Oliva Maddox PharmD  Medication Therapy Management Pharmacist    Telemedicine Visit Details  The patient's medications can be safely assessed via a telemedicine encounter.  Type of service:  Video Conference via [employer]  Originating Location (pt. Location): Home    Distant Location (provider location):  On-site  Start Time: 9:30 AM  End Time: 9:54 " AM     Medication Therapy Recommendations  Menstrual migraine without status migrainosus, not intractable   1 Current Medication: butalbital-acetaminophen-caffeine (ESGIC) -40 MG tablet   Current Medication Sig: Take 1 tablet by mouth every 6 hours as needed for headaches.   Rationale: Unsafe medication for the patient - Adverse medication event - Safety   Recommendation: Change Medication - Imitrex 50 MG Tabs   Status: Accepted per CPA   Identified Date: 6/20/2025 Completed Date: 6/20/2025         Morbid obesity (H)   1 Rationale: Untreated condition - Needs additional medication therapy - Indication   Recommendation: Start Medication - Wegovy 0.25 MG/0.5ML Soaj   Status: Accepted per CPA   Identified Date: 6/20/2025 Completed Date: 6/20/2025

## 2025-06-23 NOTE — PATIENT INSTRUCTIONS
"Recommendations from today's MTM visit:                                                      Start Imitrex 100 mg at onset of migraine, ok to repeat 2 hours later if needed. Max 200 mg/24 hour period. Take with naproxen 1 tablet.  - Use butalbital as second line or if above not effective.     Once migraine better control restart Wegovy 0.25mg subcutaneous weekly 2 weeks, then increase to 0.5 mg weekly for 6 weeks, then increase to 1 mg weekly.    Follow-up: Return in about 3 months (around 9/30/2025) for Medication Therapy Telephone Visit.    It was great speaking with you today.  I value your experience and would be very thankful for your time in providing feedback in our clinic survey. In the next few days, you may receive an email or text message from [employer] with a link to a survey related to your  clinical pharmacist.\"     To schedule another MTM appointment, please call the clinic directly or you may call the MTM scheduling line at [phone] or toll-free at [phone].     My Clinical Pharmacist's contact information:                                                      Please feel free to contact me with any questions or concerns you have.      Oliva Maddox, PharmD  Medication Therapy Management Pharmacist     "

## 2025-06-26 ENCOUNTER — TELEPHONE (OUTPATIENT)
Dept: PEDIATRICS | Facility: CLINIC | Age: 45
End: 2025-06-26
Payer: COMMERCIAL

## 2025-06-26 NOTE — TELEPHONE ENCOUNTER
BP Readings from Last 6 Encounters:   03/04/25 134/90   02/17/25 132/84   09/20/24 114/78   06/27/23 122/84   01/16/23 122/86   08/04/22 130/88       Last BP elevated with diastolic of 90 - this was UC so likely just wasn't feeling well but want to check before our next appointment. Please schedule ma only bp check.

## 2025-07-08 ENCOUNTER — ALLIED HEALTH/NURSE VISIT (OUTPATIENT)
Dept: PEDIATRICS | Facility: CLINIC | Age: 45
End: 2025-07-08
Payer: COMMERCIAL

## 2025-07-08 DIAGNOSIS — Z01.30 BP CHECK: Primary | ICD-10-CM

## 2025-07-08 PROCEDURE — 99207 PR NO CHARGE NURSE ONLY: CPT

## 2025-07-08 NOTE — PROGRESS NOTES
Dora Barrera is a 45 year old year old patient who comes in today for a Blood Pressure check because of medication change (6 months ago) and ongoing blood pressure monitoring.  Vital Signs as repeated by RN on vital machine: 128/83 1:38pm. 132/80 with manual check 1:41pm.   Patient is taking medication as prescribed  Patient is tolerating medications well.  Patient is not monitoring Blood Pressure at home.   Current complaints: headaches.     Patient has menstrual headaches, usually starts 1-2 days prior to period starting, then during period. Patient notes she has had menstrual periods since she was a teenager. Patient feels her menstrual headaches have gotten worse with age. Patient also notes zepbound caused constant periods and therefore more frequent constant headaches. Since stopping zepbound, headaches are just with periods (about once a quarter since patient is taking birth control continuously).   Disposition:  patient to continue with the same medication, patient reminded to call as needed, and routed encounter to PCP for review.     Patient currently taking:   amLODIPine-benazepril (LOTREL) 5-20 MG capsule 90 capsule 1 4/30/2025 -- No   Sig - Route: Take 1 capsule by mouth daily. - Oral     Patient used to take lisinopril-hydrochlorothiazide about 6 months ago. This had been changed due to concern of patient's potassium being decreased. Patient wants to be sure her BP is being controlled, so is ok with going back to lisinopril-hydrochlorothiazide and take potassium supplements if this will control her BP better. Patient notes she has been having menstrual migraines, seemingly worse when she started zepbound. Patient is now on wegovy, has been on this previously with no side effects, patient took first 0.25mg dose on 7/4/25.      BP within range today. Routing to PCP for review. Advise patient get home cuff to continue monitoring? Or alternative recommendation.     Alfonzo MUÑIZ RN 7/8/2025 at 1:44  PM

## 2025-07-09 VITALS — SYSTOLIC BLOOD PRESSURE: 129 MMHG | DIASTOLIC BLOOD PRESSURE: 83 MMHG

## 2025-07-09 NOTE — PROGRESS NOTES
Called and left voice message for patient to call [phone] and ask to speak to a nurse.     Upon call back please:  -relay provider message below    Bps entered in vitals section  BP Readings from Last 6 Encounters:   07/08/25 129/83   03/04/25 134/90   02/17/25 132/84   09/20/24 114/78   06/27/23 122/84   01/16/23 122/86     Awaiting call back at this time.    Heather Lopes RN

## 2025-07-09 NOTE — PROGRESS NOTES
BP Readings from Last 6 Encounters:   03/04/25 134/90   02/17/25 132/84   09/20/24 114/78   06/27/23 122/84   01/16/23 122/86   08/04/22 130/88     BP looks okay to me - can continue current meds.     RN - it doesn't look like the bp check made it into the Vitals (should pull into the smart phrase above). Can we please make sure it does?    RACHEL Contreras MD  Internal Medicine-Pediatrics

## 2025-07-10 ENCOUNTER — DOCUMENTATION ONLY (OUTPATIENT)
Dept: SLEEP MEDICINE | Facility: CLINIC | Age: 45
End: 2025-07-10
Payer: COMMERCIAL

## 2025-07-10 ENCOUNTER — TELEPHONE (OUTPATIENT)
Dept: PEDIATRICS | Facility: CLINIC | Age: 45
End: 2025-07-10

## 2025-07-10 NOTE — PROGRESS NOTES
"Called pt and left VM to call back and speak with a nurse.    Upon call back:   - please relay provider message below     \"BP looks okay to me - can continue current meds.\"    *sent MyChart message   Leti Rebolledo RN   "

## 2025-07-10 NOTE — PROGRESS NOTES
14 day Sleep therapy management telephone visit    Diagnostic AHI:    HST: 21.4        LEFT VOICE MESSAGE FOR PATIENT TO RETURN CALL      Objective measures: 14 day rolling measures   COMPLIANCE LEAK AHI AVERAGE USE IN MINUTES   0 % 0 0   3   GOAL >70% GOAL < 24 LPM GOAL <5 GOAL >240          Device settings:  CPAP MIN CPAP MAX EPR RESMED SOFT RESPONSE SETTING   6.0 cm  H20 16.0 cm  H20 TWO OFF         Patient has the following upcoming sleep appts:      Replacement device: No  STM ordered by provider: Yes     Total time spent on accessing and  interpreting remote patient PAP therapy data  10 minutes    Total time spent counseling, coaching  and reviewing PAP therapy data with patient  0 minutes

## 2025-07-28 DIAGNOSIS — G44.209 TENSION-TYPE HEADACHE, NOT INTRACTABLE, UNSPECIFIED CHRONICITY PATTERN: ICD-10-CM

## 2025-07-28 DIAGNOSIS — G43.829 MENSTRUAL MIGRAINE WITHOUT STATUS MIGRAINOSUS, NOT INTRACTABLE: ICD-10-CM

## 2025-07-28 RX ORDER — BUTALBITAL, ACETAMINOPHEN AND CAFFEINE 50; 325; 40 MG/1; MG/1; MG/1
1 TABLET ORAL EVERY 6 HOURS PRN
Qty: 12 TABLET | Refills: 0 | Status: SHIPPED | OUTPATIENT
Start: 2025-07-28

## (undated) DEVICE — ESU GROUND PAD ADULT W/CORD E7507

## (undated) DEVICE — TUBING SUCTION 10'X3/16" N510

## (undated) DEVICE — ENDO VALVE SUCTION BRONCH EVIS MAJ-209

## (undated) DEVICE — DRSG PRIMAPORE 02X3" 7133

## (undated) DEVICE — Device

## (undated) DEVICE — PREP CHLORAPREP 26ML TINTED ORANGE  260815

## (undated) DEVICE — LINEN HALF SHEET 5512

## (undated) DEVICE — DRSG TELFA 3X8" 1238

## (undated) DEVICE — PREP CHLORAPREP 26ML TINTED HI-LITE ORANGE 930815

## (undated) DEVICE — ENDO TOOTH GUARD SAC2001

## (undated) DEVICE — LINEN TOWEL PACK X6 WHITE 5487

## (undated) DEVICE — DRAPE LAP W/ARMBOARD 29410

## (undated) DEVICE — PACK MINOR CUSTOM RIDGES SBA32RMRMA

## (undated) DEVICE — SU SILK 3-0 TIE 12X30" A304H

## (undated) DEVICE — LINEN TOWEL PACK X10 5473

## (undated) DEVICE — DRAPE BREAST/CHEST 29420

## (undated) DEVICE — SYR 30ML SLIP TIP W/O NDL 302833

## (undated) DEVICE — SPONGE RAY-TEC 4X8" 7318

## (undated) DEVICE — PITCHER STERILE 1000ML  SSK9004A

## (undated) DEVICE — BAG CLEAR TRASH 1.3M 39X33" P4040C

## (undated) DEVICE — ENDO VALVE BX EVIS MAJ-210

## (undated) DEVICE — SOL NACL 0.9% IRRIG 1000ML BOTTLE 2F7124

## (undated) DEVICE — SYR EAR BULB 3OZ 0035830

## (undated) DEVICE — NDL 25GA 2"  8881200441

## (undated) DEVICE — SUCTION CANISTER MEDIVAC LINER 3000ML W/LID 65651-530

## (undated) DEVICE — GLOVE PROTEXIS MICRO 6.5  2D73PM65

## (undated) DEVICE — ENDO ADPT BRONCH SWIVEL Y A1002

## (undated) DEVICE — LINEN TOWEL PACK X5 5464

## (undated) DEVICE — KIT ENDO FIRST STEP DISINFECTANT 200ML W/POUCH EP-4

## (undated) DEVICE — SUCTION MANIFOLD NEPTUNE 2 SYS 4 PORT 0702-020-000

## (undated) DEVICE — LINEN FULL SHEET 5511

## (undated) DEVICE — SU VICRYL 4-0 PS-2 18" UND J496H

## (undated) DEVICE — SYR 10ML SLIP TIP W/O NDL 303134

## (undated) DEVICE — ANTIFOG SOLUTION W/FOAM PAD 31142527

## (undated) DEVICE — GLOVE PROTEXIS BLUE W/NEU-THERA 7.0  2D73EB70

## (undated) DEVICE — APPLICATOR COTTON TIP 6"X2 STERILE LF 6012

## (undated) DEVICE — SU SILK 2-0 SH 30" K833H

## (undated) DEVICE — SYR 10ML FINGER CONTROL W/O NDL 309695

## (undated) RX ORDER — BUPIVACAINE HYDROCHLORIDE 5 MG/ML
INJECTION, SOLUTION EPIDURAL; INTRACAUDAL
Status: DISPENSED
Start: 2020-09-21

## (undated) RX ORDER — CEFAZOLIN SODIUM 2 G/100ML
INJECTION, SOLUTION INTRAVENOUS
Status: DISPENSED
Start: 2020-09-21

## (undated) RX ORDER — LIDOCAINE HYDROCHLORIDE 20 MG/ML
INJECTION, SOLUTION EPIDURAL; INFILTRATION; INTRACAUDAL; PERINEURAL
Status: DISPENSED
Start: 2021-04-29

## (undated) RX ORDER — EPHEDRINE SULFATE 50 MG/ML
INJECTION, SOLUTION INTRAMUSCULAR; INTRAVENOUS; SUBCUTANEOUS
Status: DISPENSED
Start: 2021-04-29

## (undated) RX ORDER — PROPOFOL 10 MG/ML
INJECTION, EMULSION INTRAVENOUS
Status: DISPENSED
Start: 2021-04-29

## (undated) RX ORDER — SCOLOPAMINE TRANSDERMAL SYSTEM 1 MG/1
PATCH, EXTENDED RELEASE TRANSDERMAL
Status: DISPENSED
Start: 2021-04-29

## (undated) RX ORDER — FENTANYL CITRATE 50 UG/ML
INJECTION, SOLUTION INTRAMUSCULAR; INTRAVENOUS
Status: DISPENSED
Start: 2021-04-29

## (undated) RX ORDER — ONDANSETRON 2 MG/ML
INJECTION INTRAMUSCULAR; INTRAVENOUS
Status: DISPENSED
Start: 2021-04-29

## (undated) RX ORDER — FENTANYL CITRATE-0.9 % NACL/PF 10 MCG/ML
PLASTIC BAG, INJECTION (ML) INTRAVENOUS
Status: DISPENSED
Start: 2021-04-29

## (undated) RX ORDER — LIDOCAINE HYDROCHLORIDE 10 MG/ML
INJECTION, SOLUTION EPIDURAL; INFILTRATION; INTRACAUDAL; PERINEURAL
Status: DISPENSED
Start: 2020-09-21

## (undated) RX ORDER — FENTANYL CITRATE 50 UG/ML
INJECTION, SOLUTION INTRAMUSCULAR; INTRAVENOUS
Status: DISPENSED
Start: 2020-09-21